# Patient Record
Sex: MALE | Race: WHITE | NOT HISPANIC OR LATINO | Employment: UNEMPLOYED | URBAN - METROPOLITAN AREA
[De-identification: names, ages, dates, MRNs, and addresses within clinical notes are randomized per-mention and may not be internally consistent; named-entity substitution may affect disease eponyms.]

---

## 2021-05-27 ENCOUNTER — HOSPITAL ENCOUNTER (EMERGENCY)
Facility: HOSPITAL | Age: 66
Discharge: HOME | End: 2021-05-27
Attending: EMERGENCY MEDICINE
Payer: MEDICARE

## 2021-05-27 VITALS
SYSTOLIC BLOOD PRESSURE: 156 MMHG | TEMPERATURE: 98 F | DIASTOLIC BLOOD PRESSURE: 81 MMHG | BODY MASS INDEX: 33.25 KG/M2 | RESPIRATION RATE: 16 BRPM | HEART RATE: 88 BPM | OXYGEN SATURATION: 98 % | WEIGHT: 206.88 LBS | HEIGHT: 66 IN

## 2021-05-27 DIAGNOSIS — R60.9 EDEMA: ICD-10-CM

## 2021-05-27 DIAGNOSIS — I10 ESSENTIAL HYPERTENSION: ICD-10-CM

## 2021-05-27 DIAGNOSIS — M54.32 SCIATICA OF LEFT SIDE: ICD-10-CM

## 2021-05-27 DIAGNOSIS — M79.89 LEG SWELLING: Primary | ICD-10-CM

## 2021-05-27 LAB
ALBUMIN SERPL BCP-MCNC: 4.3 G/DL (ref 3.5–5.2)
ALP SERPL-CCNC: 83 U/L (ref 55–135)
ALT SERPL W/O P-5'-P-CCNC: 22 U/L (ref 10–44)
ANION GAP SERPL CALC-SCNC: 12 MMOL/L (ref 8–16)
AST SERPL-CCNC: 26 U/L (ref 10–40)
BASOPHILS # BLD AUTO: 0.06 K/UL (ref 0–0.2)
BASOPHILS NFR BLD: 0.5 % (ref 0–1.9)
BILIRUB SERPL-MCNC: 0.5 MG/DL (ref 0.1–1)
BNP SERPL-MCNC: 21 PG/ML (ref 0–99)
BUN SERPL-MCNC: 17 MG/DL (ref 8–23)
CALCIUM SERPL-MCNC: 9.6 MG/DL (ref 8.7–10.5)
CHLORIDE SERPL-SCNC: 100 MMOL/L (ref 95–110)
CO2 SERPL-SCNC: 30 MMOL/L (ref 23–29)
CREAT SERPL-MCNC: 1.1 MG/DL (ref 0.5–1.4)
DIFFERENTIAL METHOD: ABNORMAL
EOSINOPHIL # BLD AUTO: 0.2 K/UL (ref 0–0.5)
EOSINOPHIL NFR BLD: 1.5 % (ref 0–8)
ERYTHROCYTE [DISTWIDTH] IN BLOOD BY AUTOMATED COUNT: 12.6 % (ref 11.5–14.5)
EST. GFR  (AFRICAN AMERICAN): >60 ML/MIN/1.73 M^2
EST. GFR  (NON AFRICAN AMERICAN): >60 ML/MIN/1.73 M^2
GLUCOSE SERPL-MCNC: 95 MG/DL (ref 70–110)
HCT VFR BLD AUTO: 43 % (ref 40–54)
HCV AB SERPL QL IA: NEGATIVE
HEP C VIRUS HOLD SPECIMEN: NORMAL
HGB BLD-MCNC: 14.2 G/DL (ref 14–18)
IMM GRANULOCYTES # BLD AUTO: 0.08 K/UL (ref 0–0.04)
IMM GRANULOCYTES NFR BLD AUTO: 0.7 % (ref 0–0.5)
LYMPHOCYTES # BLD AUTO: 2.2 K/UL (ref 1–4.8)
LYMPHOCYTES NFR BLD: 18.8 % (ref 18–48)
MCH RBC QN AUTO: 30.6 PG (ref 27–31)
MCHC RBC AUTO-ENTMCNC: 33 G/DL (ref 32–36)
MCV RBC AUTO: 93 FL (ref 82–98)
MONOCYTES # BLD AUTO: 1 K/UL (ref 0.3–1)
MONOCYTES NFR BLD: 8.4 % (ref 4–15)
NEUTROPHILS # BLD AUTO: 8.3 K/UL (ref 1.8–7.7)
NEUTROPHILS NFR BLD: 70.1 % (ref 38–73)
NRBC BLD-RTO: 0 /100 WBC
PLATELET # BLD AUTO: 302 K/UL (ref 150–450)
PMV BLD AUTO: 10.9 FL (ref 9.2–12.9)
POTASSIUM SERPL-SCNC: 4 MMOL/L (ref 3.5–5.1)
PROT SERPL-MCNC: 7.9 G/DL (ref 6–8.4)
RBC # BLD AUTO: 4.64 M/UL (ref 4.6–6.2)
SODIUM SERPL-SCNC: 142 MMOL/L (ref 136–145)
WBC # BLD AUTO: 11.87 K/UL (ref 3.9–12.7)

## 2021-05-27 PROCEDURE — 36415 COLL VENOUS BLD VENIPUNCTURE: CPT | Performed by: EMERGENCY MEDICINE

## 2021-05-27 PROCEDURE — 83880 ASSAY OF NATRIURETIC PEPTIDE: CPT | Performed by: PHYSICIAN ASSISTANT

## 2021-05-27 PROCEDURE — 85025 COMPLETE CBC W/AUTO DIFF WBC: CPT | Performed by: PHYSICIAN ASSISTANT

## 2021-05-27 PROCEDURE — 80053 COMPREHEN METABOLIC PANEL: CPT | Performed by: PHYSICIAN ASSISTANT

## 2021-05-27 PROCEDURE — 86803 HEPATITIS C AB TEST: CPT | Performed by: EMERGENCY MEDICINE

## 2021-05-27 PROCEDURE — 99284 EMERGENCY DEPT VISIT MOD MDM: CPT | Mod: 25

## 2021-05-28 ENCOUNTER — PES CALL (OUTPATIENT)
Dept: ADMINISTRATIVE | Facility: CLINIC | Age: 66
End: 2021-05-28

## 2021-06-03 ENCOUNTER — HOSPITAL ENCOUNTER (EMERGENCY)
Facility: HOSPITAL | Age: 66
Discharge: HOME | End: 2021-06-03
Attending: FAMILY MEDICINE
Payer: MEDICARE

## 2021-06-03 VITALS
SYSTOLIC BLOOD PRESSURE: 114 MMHG | TEMPERATURE: 98 F | HEIGHT: 66 IN | RESPIRATION RATE: 16 BRPM | HEART RATE: 76 BPM | OXYGEN SATURATION: 98 % | DIASTOLIC BLOOD PRESSURE: 63 MMHG | BODY MASS INDEX: 33.39 KG/M2

## 2021-06-03 DIAGNOSIS — M54.32 LEFT SIDED SCIATICA: Primary | ICD-10-CM

## 2021-06-03 PROCEDURE — 25000003 PHARM REV CODE 250: Performed by: NURSE PRACTITIONER

## 2021-06-03 PROCEDURE — 96372 THER/PROPH/DIAG INJ SC/IM: CPT

## 2021-06-03 PROCEDURE — 99284 EMERGENCY DEPT VISIT MOD MDM: CPT | Mod: 25

## 2021-06-03 PROCEDURE — 63600175 PHARM REV CODE 636 W HCPCS: Performed by: NURSE PRACTITIONER

## 2021-06-03 RX ORDER — CYCLOBENZAPRINE HCL 10 MG
10 TABLET ORAL 3 TIMES DAILY PRN
Qty: 15 TABLET | Refills: 0 | Status: SHIPPED | OUTPATIENT
Start: 2021-06-03 | End: 2021-06-08

## 2021-06-03 RX ORDER — ORPHENADRINE CITRATE 30 MG/ML
60 INJECTION INTRAMUSCULAR; INTRAVENOUS
Status: COMPLETED | OUTPATIENT
Start: 2021-06-03 | End: 2021-06-03

## 2021-06-03 RX ORDER — HYDROCODONE BITARTRATE AND ACETAMINOPHEN 7.5; 325 MG/1; MG/1
1 TABLET ORAL EVERY 12 HOURS PRN
Qty: 12 TABLET | Refills: 0 | Status: SHIPPED | OUTPATIENT
Start: 2021-06-03 | End: 2021-06-09

## 2021-06-03 RX ORDER — MEPERIDINE HYDROCHLORIDE 25 MG/ML
50 INJECTION INTRAMUSCULAR; INTRAVENOUS; SUBCUTANEOUS
Status: COMPLETED | OUTPATIENT
Start: 2021-06-03 | End: 2021-06-03

## 2021-06-03 RX ORDER — PREDNISONE 10 MG/1
10 TABLET ORAL DAILY
Qty: 21 TABLET | Refills: 0 | Status: SHIPPED | OUTPATIENT
Start: 2021-06-03 | End: 2022-01-05

## 2021-06-03 RX ORDER — DICLOFENAC SODIUM 50 MG/1
50 TABLET, DELAYED RELEASE ORAL 3 TIMES DAILY PRN
Qty: 15 TABLET | Refills: 0 | Status: SHIPPED | OUTPATIENT
Start: 2021-06-03 | End: 2022-01-05

## 2021-06-03 RX ORDER — KETOROLAC TROMETHAMINE 10 MG/1
10 TABLET, FILM COATED ORAL
Status: COMPLETED | OUTPATIENT
Start: 2021-06-03 | End: 2021-06-03

## 2021-06-03 RX ORDER — ONDANSETRON 8 MG/1
8 TABLET, ORALLY DISINTEGRATING ORAL
Status: COMPLETED | OUTPATIENT
Start: 2021-06-03 | End: 2021-06-03

## 2021-06-03 RX ORDER — PREDNISONE 20 MG/1
60 TABLET ORAL
Status: COMPLETED | OUTPATIENT
Start: 2021-06-03 | End: 2021-06-03

## 2021-06-03 RX ADMIN — KETOROLAC TROMETHAMINE 10 MG: 10 TABLET, FILM COATED ORAL at 10:06

## 2021-06-03 RX ADMIN — PREDNISONE 60 MG: 20 TABLET ORAL at 10:06

## 2021-06-03 RX ADMIN — ONDANSETRON 8 MG: 8 TABLET, ORALLY DISINTEGRATING ORAL at 10:06

## 2021-06-03 RX ADMIN — ORPHENADRINE CITRATE 60 MG: 30 INJECTION INTRAMUSCULAR; INTRAVENOUS at 10:06

## 2021-06-03 RX ADMIN — MEPERIDINE HYDROCHLORIDE 50 MG: 25 INJECTION INTRAMUSCULAR; INTRAVENOUS; SUBCUTANEOUS at 10:06

## 2021-06-22 DIAGNOSIS — M25.512 LEFT SHOULDER PAIN, UNSPECIFIED CHRONICITY: Primary | ICD-10-CM

## 2021-06-23 ENCOUNTER — HOSPITAL ENCOUNTER (OUTPATIENT)
Dept: RADIOLOGY | Facility: HOSPITAL | Age: 66
Discharge: HOME | End: 2021-06-23
Attending: PHYSICIAN ASSISTANT
Payer: MEDICARE

## 2021-06-23 ENCOUNTER — OFFICE VISIT (OUTPATIENT)
Dept: ORTHOPEDICS | Facility: CLINIC | Age: 66
End: 2021-06-23
Payer: MEDICARE

## 2021-06-23 ENCOUNTER — TELEPHONE (OUTPATIENT)
Dept: PAIN MEDICINE | Facility: CLINIC | Age: 66
End: 2021-06-23

## 2021-06-23 VITALS
DIASTOLIC BLOOD PRESSURE: 74 MMHG | HEART RATE: 89 BPM | SYSTOLIC BLOOD PRESSURE: 111 MMHG | HEIGHT: 66 IN | BODY MASS INDEX: 33.11 KG/M2 | WEIGHT: 206 LBS

## 2021-06-23 DIAGNOSIS — M25.512 LEFT SHOULDER PAIN, UNSPECIFIED CHRONICITY: ICD-10-CM

## 2021-06-23 DIAGNOSIS — M79.18 MYOFASCIAL PAIN ON LEFT SIDE: Primary | ICD-10-CM

## 2021-06-23 DIAGNOSIS — M54.50 LUMBOSACRAL PAIN: ICD-10-CM

## 2021-06-23 DIAGNOSIS — M54.2 NECK PAIN: ICD-10-CM

## 2021-06-23 DIAGNOSIS — R20.0 NUMBNESS AND TINGLING OF LEFT LEG: ICD-10-CM

## 2021-06-23 DIAGNOSIS — R20.0 NUMBNESS AND TINGLING IN LEFT ARM: ICD-10-CM

## 2021-06-23 DIAGNOSIS — M54.50 LOW BACK PAIN, UNSPECIFIED BACK PAIN LATERALITY, UNSPECIFIED CHRONICITY, UNSPECIFIED WHETHER SCIATICA PRESENT: ICD-10-CM

## 2021-06-23 DIAGNOSIS — R20.2 NUMBNESS AND TINGLING IN LEFT ARM: ICD-10-CM

## 2021-06-23 DIAGNOSIS — R20.2 NUMBNESS AND TINGLING OF LEFT LEG: ICD-10-CM

## 2021-06-23 DIAGNOSIS — M54.12 CERVICAL RADICULOPATHY: ICD-10-CM

## 2021-06-23 DIAGNOSIS — M54.50 LOW BACK PAIN, UNSPECIFIED BACK PAIN LATERALITY, UNSPECIFIED CHRONICITY, UNSPECIFIED WHETHER SCIATICA PRESENT: Primary | ICD-10-CM

## 2021-06-23 PROCEDURE — 99999 PR PBB SHADOW E&M-EST. PATIENT-LVL IV: ICD-10-PCS | Mod: PBBFAC,,, | Performed by: PHYSICIAN ASSISTANT

## 2021-06-23 PROCEDURE — 99214 OFFICE O/P EST MOD 30 MIN: CPT | Mod: PBBFAC,25 | Performed by: PHYSICIAN ASSISTANT

## 2021-06-23 PROCEDURE — 72110 XR LUMBAR SPINE COMPLETE 5 VIEW: ICD-10-PCS | Mod: 26,,, | Performed by: RADIOLOGY

## 2021-06-23 PROCEDURE — 72110 X-RAY EXAM L-2 SPINE 4/>VWS: CPT | Mod: TC

## 2021-06-23 PROCEDURE — 72050 X-RAY EXAM NECK SPINE 4/5VWS: CPT | Mod: TC

## 2021-06-23 PROCEDURE — 72110 X-RAY EXAM L-2 SPINE 4/>VWS: CPT | Mod: 26,,, | Performed by: RADIOLOGY

## 2021-06-23 PROCEDURE — 73030 X-RAY EXAM OF SHOULDER: CPT | Mod: 26,LT,, | Performed by: RADIOLOGY

## 2021-06-23 PROCEDURE — 72050 X-RAY EXAM NECK SPINE 4/5VWS: CPT | Mod: 26,,, | Performed by: RADIOLOGY

## 2021-06-23 PROCEDURE — 72050 XR CERVICAL SPINE COMPLETE 5 VIEW: ICD-10-PCS | Mod: 26,,, | Performed by: RADIOLOGY

## 2021-06-23 PROCEDURE — 99203 OFFICE O/P NEW LOW 30 MIN: CPT | Mod: S$PBB,,, | Performed by: PHYSICIAN ASSISTANT

## 2021-06-23 PROCEDURE — 73030 XR SHOULDER COMPLETE 2 OR MORE VIEWS LEFT: ICD-10-PCS | Mod: 26,LT,, | Performed by: RADIOLOGY

## 2021-06-23 PROCEDURE — 73030 X-RAY EXAM OF SHOULDER: CPT | Mod: TC,LT

## 2021-06-23 PROCEDURE — 99203 PR OFFICE/OUTPT VISIT, NEW, LEVL III, 30-44 MIN: ICD-10-PCS | Mod: S$PBB,,, | Performed by: PHYSICIAN ASSISTANT

## 2021-06-23 PROCEDURE — 99999 PR PBB SHADOW E&M-EST. PATIENT-LVL IV: CPT | Mod: PBBFAC,,, | Performed by: PHYSICIAN ASSISTANT

## 2021-06-23 RX ORDER — CYCLOBENZAPRINE HCL 5 MG
5 TABLET ORAL 3 TIMES DAILY PRN
Qty: 60 TABLET | Refills: 0 | Status: SHIPPED | OUTPATIENT
Start: 2021-06-23 | End: 2022-01-05

## 2021-06-23 RX ORDER — POTASSIUM CHLORIDE 1.5 G/1.58G
20 POWDER, FOR SOLUTION ORAL
COMMUNITY
End: 2022-01-05 | Stop reason: SDUPTHER

## 2021-06-23 RX ORDER — NAPROXEN 500 MG/1
500 TABLET ORAL
COMMUNITY
End: 2022-01-05

## 2021-06-23 RX ORDER — FUROSEMIDE 40 MG/1
40 TABLET ORAL EVERY MORNING
Status: ON HOLD | COMMUNITY
End: 2022-01-08 | Stop reason: HOSPADM

## 2021-06-23 RX ORDER — AMLODIPINE BESYLATE 5 MG/1
10 TABLET ORAL
COMMUNITY
End: 2022-01-05

## 2021-06-23 RX ORDER — AMLODIPINE BESYLATE 10 MG/1
10 TABLET ORAL DAILY
COMMUNITY
End: 2022-01-14

## 2021-06-23 RX ORDER — LISINOPRIL 20 MG/1
20 TABLET ORAL DAILY
COMMUNITY
End: 2022-05-05

## 2021-06-23 RX ORDER — METFORMIN HYDROCHLORIDE 500 MG/1
500 TABLET ORAL 2 TIMES DAILY WITH MEALS
COMMUNITY
End: 2022-05-05

## 2021-06-23 RX ORDER — GABAPENTIN 300 MG/1
300 CAPSULE ORAL NIGHTLY
Qty: 30 CAPSULE | Refills: 1 | Status: SHIPPED | OUTPATIENT
Start: 2021-06-23 | End: 2022-01-14 | Stop reason: SDUPTHER

## 2021-06-28 ENCOUNTER — TELEPHONE (OUTPATIENT)
Dept: PAIN MEDICINE | Facility: CLINIC | Age: 66
End: 2021-06-28

## 2021-06-29 ENCOUNTER — CLINICAL SUPPORT (OUTPATIENT)
Dept: REHABILITATION | Facility: HOSPITAL | Age: 66
End: 2021-06-29
Payer: MEDICARE

## 2021-06-29 DIAGNOSIS — M79.18 MYOFASCIAL PAIN ON LEFT SIDE: ICD-10-CM

## 2021-06-29 DIAGNOSIS — R29.898 DECREASED STRENGTH OF LOWER EXTREMITY: ICD-10-CM

## 2021-06-29 DIAGNOSIS — M54.50 LUMBOSACRAL PAIN: ICD-10-CM

## 2021-06-29 DIAGNOSIS — M53.86 DECREASED ROM OF LUMBAR SPINE: ICD-10-CM

## 2021-06-29 DIAGNOSIS — R29.898 DECREASED ROM OF NECK: ICD-10-CM

## 2021-06-29 DIAGNOSIS — M54.12 CERVICAL RADICULOPATHY: ICD-10-CM

## 2021-06-29 PROCEDURE — 97110 THERAPEUTIC EXERCISES: CPT | Mod: PN

## 2021-06-29 PROCEDURE — 97161 PT EVAL LOW COMPLEX 20 MIN: CPT | Mod: PN

## 2021-07-06 ENCOUNTER — CLINICAL SUPPORT (OUTPATIENT)
Dept: REHABILITATION | Facility: HOSPITAL | Age: 66
End: 2021-07-06
Payer: MEDICARE

## 2021-07-06 DIAGNOSIS — R29.898 DECREASED ROM OF NECK: ICD-10-CM

## 2021-07-06 DIAGNOSIS — M53.86 DECREASED ROM OF LUMBAR SPINE: ICD-10-CM

## 2021-07-06 DIAGNOSIS — R29.898 DECREASED STRENGTH OF LOWER EXTREMITY: ICD-10-CM

## 2021-07-06 PROCEDURE — 97012 MECHANICAL TRACTION THERAPY: CPT | Mod: PN

## 2021-07-06 PROCEDURE — 97110 THERAPEUTIC EXERCISES: CPT | Mod: PN

## 2021-07-08 ENCOUNTER — CLINICAL SUPPORT (OUTPATIENT)
Dept: REHABILITATION | Facility: HOSPITAL | Age: 66
End: 2021-07-08
Payer: MEDICARE

## 2021-07-08 DIAGNOSIS — M53.86 DECREASED ROM OF LUMBAR SPINE: ICD-10-CM

## 2021-07-08 DIAGNOSIS — R29.898 DECREASED ROM OF NECK: ICD-10-CM

## 2021-07-08 DIAGNOSIS — R29.898 DECREASED STRENGTH OF LOWER EXTREMITY: ICD-10-CM

## 2021-07-08 PROCEDURE — 97012 MECHANICAL TRACTION THERAPY: CPT | Mod: PN

## 2021-07-08 PROCEDURE — 97110 THERAPEUTIC EXERCISES: CPT | Mod: PN

## 2021-07-14 ENCOUNTER — DOCUMENTATION ONLY (OUTPATIENT)
Dept: REHABILITATION | Facility: HOSPITAL | Age: 66
End: 2021-07-14

## 2021-07-16 ENCOUNTER — DOCUMENTATION ONLY (OUTPATIENT)
Dept: REHABILITATION | Facility: HOSPITAL | Age: 66
End: 2021-07-16

## 2021-07-20 ENCOUNTER — CLINICAL SUPPORT (OUTPATIENT)
Dept: REHABILITATION | Facility: HOSPITAL | Age: 66
End: 2021-07-20
Payer: MEDICARE

## 2021-07-20 DIAGNOSIS — M53.86 DECREASED ROM OF LUMBAR SPINE: ICD-10-CM

## 2021-07-20 DIAGNOSIS — R29.898 DECREASED STRENGTH OF LOWER EXTREMITY: ICD-10-CM

## 2021-07-20 DIAGNOSIS — R29.898 DECREASED ROM OF NECK: ICD-10-CM

## 2021-07-20 PROCEDURE — 97012 MECHANICAL TRACTION THERAPY: CPT | Mod: PN

## 2021-07-20 PROCEDURE — 97110 THERAPEUTIC EXERCISES: CPT | Mod: PN

## 2021-07-22 ENCOUNTER — CLINICAL SUPPORT (OUTPATIENT)
Dept: REHABILITATION | Facility: HOSPITAL | Age: 66
End: 2021-07-22
Payer: MEDICARE

## 2021-07-22 DIAGNOSIS — R29.898 DECREASED ROM OF NECK: ICD-10-CM

## 2021-07-22 DIAGNOSIS — M53.86 DECREASED ROM OF LUMBAR SPINE: ICD-10-CM

## 2021-07-22 DIAGNOSIS — R29.898 DECREASED STRENGTH OF LOWER EXTREMITY: ICD-10-CM

## 2021-07-22 PROCEDURE — 97110 THERAPEUTIC EXERCISES: CPT | Mod: PN

## 2021-07-22 PROCEDURE — 97014 ELECTRIC STIMULATION THERAPY: CPT | Mod: PN

## 2021-07-29 ENCOUNTER — CLINICAL SUPPORT (OUTPATIENT)
Dept: REHABILITATION | Facility: HOSPITAL | Age: 66
End: 2021-07-29
Payer: MEDICARE

## 2021-07-29 DIAGNOSIS — R29.898 DECREASED STRENGTH OF LOWER EXTREMITY: ICD-10-CM

## 2021-07-29 DIAGNOSIS — M53.86 DECREASED ROM OF LUMBAR SPINE: ICD-10-CM

## 2021-07-29 DIAGNOSIS — R29.898 DECREASED ROM OF NECK: ICD-10-CM

## 2021-07-29 PROCEDURE — 97014 ELECTRIC STIMULATION THERAPY: CPT | Mod: PN

## 2021-07-29 PROCEDURE — 97110 THERAPEUTIC EXERCISES: CPT | Mod: PN

## 2021-08-05 ENCOUNTER — OFFICE VISIT (OUTPATIENT)
Dept: PHYSICAL MEDICINE AND REHAB | Facility: CLINIC | Age: 66
End: 2021-08-05
Payer: MEDICARE

## 2021-08-05 VITALS
HEIGHT: 66 IN | BODY MASS INDEX: 33.11 KG/M2 | WEIGHT: 206 LBS | RESPIRATION RATE: 14 BRPM | HEART RATE: 91 BPM | SYSTOLIC BLOOD PRESSURE: 130 MMHG | DIASTOLIC BLOOD PRESSURE: 81 MMHG

## 2021-08-05 DIAGNOSIS — M54.16 LUMBAR RADICULOPATHY: ICD-10-CM

## 2021-08-05 DIAGNOSIS — M54.12 CERVICAL RADICULOPATHY: Primary | ICD-10-CM

## 2021-08-05 DIAGNOSIS — G56.03 BILATERAL CARPAL TUNNEL SYNDROME: ICD-10-CM

## 2021-08-05 PROBLEM — B00.9 HERPES SIMPLEX TYPE 1 INFECTION: Status: ACTIVE | Noted: 2020-12-11

## 2021-08-05 PROBLEM — E78.2 MIXED HYPERLIPIDEMIA: Status: ACTIVE | Noted: 2020-12-11

## 2021-08-05 PROBLEM — R73.03 PREDIABETES: Status: ACTIVE | Noted: 2020-12-11

## 2021-08-05 PROBLEM — E55.9 VITAMIN D DEFICIENCY: Status: ACTIVE | Noted: 2020-12-11

## 2021-08-05 PROCEDURE — 99999 PR PBB SHADOW E&M-EST. PATIENT-LVL III: CPT | Mod: PBBFAC,,, | Performed by: PHYSICAL MEDICINE & REHABILITATION

## 2021-08-05 PROCEDURE — 95913 NRV CNDJ TEST 13/> STUDIES: CPT | Mod: 26,S$PBB,, | Performed by: PHYSICAL MEDICINE & REHABILITATION

## 2021-08-05 PROCEDURE — 95886 MUSC TEST DONE W/N TEST COMP: CPT | Mod: PBBFAC | Performed by: PHYSICAL MEDICINE & REHABILITATION

## 2021-08-05 PROCEDURE — 95886 PR EMG COMPLETE, W/ NERVE CONDUCTION STUDIES, 5+ MUSCLES: ICD-10-PCS | Mod: 26,S$PBB,, | Performed by: PHYSICAL MEDICINE & REHABILITATION

## 2021-08-05 PROCEDURE — 99213 OFFICE O/P EST LOW 20 MIN: CPT | Mod: PBBFAC,25 | Performed by: PHYSICAL MEDICINE & REHABILITATION

## 2021-08-05 PROCEDURE — 95886 MUSC TEST DONE W/N TEST COMP: CPT | Mod: 26,S$PBB,, | Performed by: PHYSICAL MEDICINE & REHABILITATION

## 2021-08-05 PROCEDURE — 99999 PR PBB SHADOW E&M-EST. PATIENT-LVL III: ICD-10-PCS | Mod: PBBFAC,,, | Performed by: PHYSICAL MEDICINE & REHABILITATION

## 2021-08-05 PROCEDURE — 99204 PR OFFICE/OUTPT VISIT, NEW, LEVL IV, 45-59 MIN: ICD-10-PCS | Mod: 25,S$PBB,, | Performed by: PHYSICAL MEDICINE & REHABILITATION

## 2021-08-05 PROCEDURE — 95913 NRV CNDJ TEST 13/> STUDIES: CPT | Mod: PBBFAC | Performed by: PHYSICAL MEDICINE & REHABILITATION

## 2021-08-05 PROCEDURE — 95913 PR NERVE CONDUCTION STUDY; 13 OR MORE STUDIES: ICD-10-PCS | Mod: 26,S$PBB,, | Performed by: PHYSICAL MEDICINE & REHABILITATION

## 2021-08-05 PROCEDURE — 99204 OFFICE O/P NEW MOD 45 MIN: CPT | Mod: 25,S$PBB,, | Performed by: PHYSICAL MEDICINE & REHABILITATION

## 2021-08-05 RX ORDER — TRAMADOL HYDROCHLORIDE 50 MG/1
TABLET ORAL
COMMUNITY
End: 2022-01-05

## 2021-08-05 RX ORDER — TIZANIDINE 4 MG/1
TABLET ORAL
COMMUNITY
End: 2022-01-05

## 2021-08-05 RX ORDER — HYDROCHLOROTHIAZIDE 25 MG/1
25 TABLET ORAL DAILY
Status: ON HOLD | COMMUNITY
Start: 2021-07-24 | End: 2022-01-08 | Stop reason: HOSPADM

## 2021-08-11 ENCOUNTER — PATIENT MESSAGE (OUTPATIENT)
Dept: ORTHOPEDICS | Facility: CLINIC | Age: 66
End: 2021-08-11

## 2021-09-28 ENCOUNTER — DOCUMENTATION ONLY (OUTPATIENT)
Dept: REHABILITATION | Facility: HOSPITAL | Age: 66
End: 2021-09-28

## 2021-09-28 PROBLEM — R29.898 DECREASED STRENGTH OF LOWER EXTREMITY: Status: RESOLVED | Noted: 2021-06-29 | Resolved: 2021-09-28

## 2021-09-28 PROBLEM — R29.898 DECREASED ROM OF NECK: Status: RESOLVED | Noted: 2021-06-29 | Resolved: 2021-09-28

## 2021-09-28 PROBLEM — M53.86 DECREASED ROM OF LUMBAR SPINE: Status: RESOLVED | Noted: 2021-06-29 | Resolved: 2021-09-28

## 2021-12-22 ENCOUNTER — OFFICE VISIT (OUTPATIENT)
Dept: OPHTHALMOLOGY | Facility: CLINIC | Age: 66
End: 2021-12-22
Payer: MEDICARE

## 2021-12-22 ENCOUNTER — LAB VISIT (OUTPATIENT)
Dept: LAB | Facility: HOSPITAL | Age: 66
End: 2021-12-22
Attending: OPHTHALMOLOGY
Payer: MEDICARE

## 2021-12-22 DIAGNOSIS — H53.453 ALTITUDINAL SCOTOMA OF BOTH EYES: Primary | ICD-10-CM

## 2021-12-22 DIAGNOSIS — H47.20 OPTIC ATROPHY, BOTH EYES: ICD-10-CM

## 2021-12-22 DIAGNOSIS — H53.453 ALTITUDINAL SCOTOMA OF BOTH EYES: ICD-10-CM

## 2021-12-22 LAB
BASOPHILS # BLD AUTO: 0.05 K/UL (ref 0–0.2)
BASOPHILS NFR BLD: 0.5 % (ref 0–1.9)
CREAT SERPL-MCNC: 1.3 MG/DL (ref 0.5–1.4)
CRP SERPL-MCNC: 8.5 MG/L (ref 0–8.2)
DIFFERENTIAL METHOD: ABNORMAL
EOSINOPHIL # BLD AUTO: 0.2 K/UL (ref 0–0.5)
EOSINOPHIL NFR BLD: 1.9 % (ref 0–8)
ERYTHROCYTE [DISTWIDTH] IN BLOOD BY AUTOMATED COUNT: 13 % (ref 11.5–14.5)
ERYTHROCYTE [SEDIMENTATION RATE] IN BLOOD BY WESTERGREN METHOD: 16 MM/HR (ref 0–23)
EST. GFR  (AFRICAN AMERICAN): >60 ML/MIN/1.73 M^2
EST. GFR  (NON AFRICAN AMERICAN): 56.5 ML/MIN/1.73 M^2
HCT VFR BLD AUTO: 41.6 % (ref 40–54)
HGB BLD-MCNC: 12.8 G/DL (ref 14–18)
IMM GRANULOCYTES # BLD AUTO: 0.1 K/UL (ref 0–0.04)
IMM GRANULOCYTES NFR BLD AUTO: 0.9 % (ref 0–0.5)
LYMPHOCYTES # BLD AUTO: 2 K/UL (ref 1–4.8)
LYMPHOCYTES NFR BLD: 18.4 % (ref 18–48)
MCH RBC QN AUTO: 30 PG (ref 27–31)
MCHC RBC AUTO-ENTMCNC: 30.8 G/DL (ref 32–36)
MCV RBC AUTO: 97 FL (ref 82–98)
MONOCYTES # BLD AUTO: 0.9 K/UL (ref 0.3–1)
MONOCYTES NFR BLD: 8.2 % (ref 4–15)
NEUTROPHILS # BLD AUTO: 7.6 K/UL (ref 1.8–7.7)
NEUTROPHILS NFR BLD: 70.1 % (ref 38–73)
NRBC BLD-RTO: 0 /100 WBC
PLATELET # BLD AUTO: 325 K/UL (ref 150–450)
PMV BLD AUTO: 10.5 FL (ref 9.2–12.9)
RBC # BLD AUTO: 4.27 M/UL (ref 4.6–6.2)
WBC # BLD AUTO: 10.76 K/UL (ref 3.9–12.7)

## 2021-12-22 PROCEDURE — 92133 POSTERIOR SEGMENT OCT OPTIC NERVE(OCULAR COHERENCE TOMOGRAPHY) - OU - BOTH EYES: ICD-10-PCS | Mod: 26,S$PBB,, | Performed by: OPHTHALMOLOGY

## 2021-12-22 PROCEDURE — 99999 PR PBB SHADOW E&M-EST. PATIENT-LVL III: CPT | Mod: PBBFAC,,, | Performed by: OPHTHALMOLOGY

## 2021-12-22 PROCEDURE — 85652 RBC SED RATE AUTOMATED: CPT | Performed by: OPHTHALMOLOGY

## 2021-12-22 PROCEDURE — 92133 CPTRZD OPH DX IMG PST SGM ON: CPT | Mod: PBBFAC | Performed by: OPHTHALMOLOGY

## 2021-12-22 PROCEDURE — 86140 C-REACTIVE PROTEIN: CPT | Performed by: OPHTHALMOLOGY

## 2021-12-22 PROCEDURE — 36415 COLL VENOUS BLD VENIPUNCTURE: CPT | Performed by: OPHTHALMOLOGY

## 2021-12-22 PROCEDURE — 99204 OFFICE O/P NEW MOD 45 MIN: CPT | Mod: S$PBB,,, | Performed by: OPHTHALMOLOGY

## 2021-12-22 PROCEDURE — 99213 OFFICE O/P EST LOW 20 MIN: CPT | Mod: PBBFAC | Performed by: OPHTHALMOLOGY

## 2021-12-22 PROCEDURE — 99999 PR PBB SHADOW E&M-EST. PATIENT-LVL III: ICD-10-PCS | Mod: PBBFAC,,, | Performed by: OPHTHALMOLOGY

## 2021-12-22 PROCEDURE — 85025 COMPLETE CBC W/AUTO DIFF WBC: CPT | Performed by: OPHTHALMOLOGY

## 2021-12-22 PROCEDURE — 82565 ASSAY OF CREATININE: CPT | Performed by: OPHTHALMOLOGY

## 2021-12-22 PROCEDURE — 99204 PR OFFICE/OUTPT VISIT, NEW, LEVL IV, 45-59 MIN: ICD-10-PCS | Mod: S$PBB,,, | Performed by: OPHTHALMOLOGY

## 2022-01-05 ENCOUNTER — HOSPITAL ENCOUNTER (OUTPATIENT)
Dept: RADIOLOGY | Facility: HOSPITAL | Age: 67
Discharge: HOME | End: 2022-01-05
Attending: OPHTHALMOLOGY
Payer: MEDICARE

## 2022-01-05 ENCOUNTER — TELEPHONE (OUTPATIENT)
Dept: OPHTHALMOLOGY | Facility: CLINIC | Age: 67
End: 2022-01-05
Payer: MEDICARE

## 2022-01-05 ENCOUNTER — HOSPITAL ENCOUNTER (INPATIENT)
Facility: HOSPITAL | Age: 67
LOS: 2 days | Discharge: HOME | DRG: 064 | End: 2022-01-08
Attending: EMERGENCY MEDICINE | Admitting: INTERNAL MEDICINE
Payer: MEDICARE

## 2022-01-05 DIAGNOSIS — I63.9 ACUTE CVA (CEREBROVASCULAR ACCIDENT): ICD-10-CM

## 2022-01-05 DIAGNOSIS — H53.453 ALTITUDINAL SCOTOMA OF BOTH EYES: ICD-10-CM

## 2022-01-05 DIAGNOSIS — I63.9 STROKE DUE TO EMBOLISM: ICD-10-CM

## 2022-01-05 DIAGNOSIS — U07.1 COVID-19 VIRUS INFECTION: ICD-10-CM

## 2022-01-05 DIAGNOSIS — I63.9 CEREBROVASCULAR ACCIDENT (CVA), UNSPECIFIED MECHANISM: Primary | ICD-10-CM

## 2022-01-05 DIAGNOSIS — H47.20 OPTIC ATROPHY, BOTH EYES: ICD-10-CM

## 2022-01-05 DIAGNOSIS — I63.9 CVA (CEREBRAL VASCULAR ACCIDENT): ICD-10-CM

## 2022-01-05 PROBLEM — N17.9 AKI (ACUTE KIDNEY INJURY): Status: ACTIVE | Noted: 2022-01-05

## 2022-01-05 PROBLEM — R46.89: Status: ACTIVE | Noted: 2022-01-05

## 2022-01-05 PROBLEM — B35.6 TINEA CRURIS: Status: ACTIVE | Noted: 2022-01-05

## 2022-01-05 LAB
ALBUMIN SERPL BCP-MCNC: 3.6 G/DL (ref 3.5–5.2)
ALP SERPL-CCNC: 76 U/L (ref 55–135)
ALT SERPL W/O P-5'-P-CCNC: 35 U/L (ref 10–44)
ANION GAP SERPL CALC-SCNC: 8 MMOL/L (ref 8–16)
AST SERPL-CCNC: 33 U/L (ref 10–40)
BASOPHILS # BLD AUTO: 0.05 K/UL (ref 0–0.2)
BASOPHILS NFR BLD: 0.5 % (ref 0–1.9)
BILIRUB SERPL-MCNC: 0.3 MG/DL (ref 0.1–1)
BILIRUB UR QL STRIP: NEGATIVE
BUN SERPL-MCNC: 62 MG/DL (ref 8–23)
CALCIUM SERPL-MCNC: 9.2 MG/DL (ref 8.7–10.5)
CHLORIDE SERPL-SCNC: 105 MMOL/L (ref 95–110)
CLARITY UR: CLEAR
CO2 SERPL-SCNC: 27 MMOL/L (ref 23–29)
COLOR UR: YELLOW
CREAT SERPL-MCNC: 2.6 MG/DL (ref 0.5–1.4)
DIFFERENTIAL METHOD: ABNORMAL
EOSINOPHIL # BLD AUTO: 0.2 K/UL (ref 0–0.5)
EOSINOPHIL NFR BLD: 1.6 % (ref 0–8)
ERYTHROCYTE [DISTWIDTH] IN BLOOD BY AUTOMATED COUNT: 13 % (ref 11.5–14.5)
EST. GFR  (AFRICAN AMERICAN): 28 ML/MIN/1.73 M^2
EST. GFR  (NON AFRICAN AMERICAN): 24 ML/MIN/1.73 M^2
GLUCOSE SERPL-MCNC: 83 MG/DL (ref 70–110)
GLUCOSE UR QL STRIP: NEGATIVE
HCT VFR BLD AUTO: 36.3 % (ref 40–54)
HGB BLD-MCNC: 11.8 G/DL (ref 14–18)
HGB UR QL STRIP: NEGATIVE
IMM GRANULOCYTES # BLD AUTO: 0.15 K/UL (ref 0–0.04)
IMM GRANULOCYTES NFR BLD AUTO: 1.6 % (ref 0–0.5)
KETONES UR QL STRIP: NEGATIVE
LEUKOCYTE ESTERASE UR QL STRIP: NEGATIVE
LYMPHOCYTES # BLD AUTO: 1.9 K/UL (ref 1–4.8)
LYMPHOCYTES NFR BLD: 19.8 % (ref 18–48)
MCH RBC QN AUTO: 29.8 PG (ref 27–31)
MCHC RBC AUTO-ENTMCNC: 32.5 G/DL (ref 32–36)
MCV RBC AUTO: 92 FL (ref 82–98)
MONOCYTES # BLD AUTO: 1 K/UL (ref 0.3–1)
MONOCYTES NFR BLD: 10.6 % (ref 4–15)
NEUTROPHILS # BLD AUTO: 6.4 K/UL (ref 1.8–7.7)
NEUTROPHILS NFR BLD: 65.9 % (ref 38–73)
NITRITE UR QL STRIP: NEGATIVE
NRBC BLD-RTO: 0 /100 WBC
PH UR STRIP: 6 [PH] (ref 5–8)
PLATELET # BLD AUTO: 302 K/UL (ref 150–450)
PMV BLD AUTO: 10.6 FL (ref 9.2–12.9)
POCT GLUCOSE: 109 MG/DL (ref 70–110)
POTASSIUM SERPL-SCNC: 5.3 MMOL/L (ref 3.5–5.1)
PROT SERPL-MCNC: 6.7 G/DL (ref 6–8.4)
PROT UR QL STRIP: NEGATIVE
RBC # BLD AUTO: 3.96 M/UL (ref 4.6–6.2)
SODIUM SERPL-SCNC: 140 MMOL/L (ref 136–145)
SP GR UR STRIP: 1.02 (ref 1–1.03)
TSH SERPL DL<=0.005 MIU/L-ACNC: 0.6 UIU/ML (ref 0.4–4)
URN SPEC COLLECT METH UR: NORMAL
UROBILINOGEN UR STRIP-ACNC: NEGATIVE EU/DL
WBC # BLD AUTO: 9.63 K/UL (ref 3.9–12.7)

## 2022-01-05 PROCEDURE — 82962 GLUCOSE BLOOD TEST: CPT

## 2022-01-05 PROCEDURE — 63600175 PHARM REV CODE 636 W HCPCS: Performed by: FAMILY MEDICINE

## 2022-01-05 PROCEDURE — G0378 HOSPITAL OBSERVATION PER HR: HCPCS

## 2022-01-05 PROCEDURE — A9585 GADOBUTROL INJECTION: HCPCS | Performed by: OPHTHALMOLOGY

## 2022-01-05 PROCEDURE — 70553 MRI BRAIN W WO CONTRAST: ICD-10-PCS | Mod: 26,,, | Performed by: RADIOLOGY

## 2022-01-05 PROCEDURE — 99285 EMERGENCY DEPT VISIT HI MDM: CPT | Mod: 25

## 2022-01-05 PROCEDURE — 25500020 PHARM REV CODE 255: Performed by: OPHTHALMOLOGY

## 2022-01-05 PROCEDURE — 96374 THER/PROPH/DIAG INJ IV PUSH: CPT

## 2022-01-05 PROCEDURE — 80053 COMPREHEN METABOLIC PANEL: CPT | Performed by: EMERGENCY MEDICINE

## 2022-01-05 PROCEDURE — 99205 PR OFFICE/OUTPT VISIT, NEW, LEVL V, 60-74 MIN: ICD-10-PCS | Mod: ,,, | Performed by: PSYCHIATRY & NEUROLOGY

## 2022-01-05 PROCEDURE — 93010 ELECTROCARDIOGRAM REPORT: CPT | Mod: ,,, | Performed by: INTERNAL MEDICINE

## 2022-01-05 PROCEDURE — 70553 MRI BRAIN STEM W/O & W/DYE: CPT | Mod: 26,,, | Performed by: RADIOLOGY

## 2022-01-05 PROCEDURE — 70553 MRI BRAIN STEM W/O & W/DYE: CPT | Mod: TC

## 2022-01-05 PROCEDURE — 81003 URINALYSIS AUTO W/O SCOPE: CPT | Performed by: FAMILY MEDICINE

## 2022-01-05 PROCEDURE — 93010 EKG 12-LEAD: ICD-10-PCS | Mod: ,,, | Performed by: INTERNAL MEDICINE

## 2022-01-05 PROCEDURE — 93005 ELECTROCARDIOGRAM TRACING: CPT

## 2022-01-05 PROCEDURE — 85025 COMPLETE CBC W/AUTO DIFF WBC: CPT | Performed by: EMERGENCY MEDICINE

## 2022-01-05 PROCEDURE — 99205 OFFICE O/P NEW HI 60 MIN: CPT | Mod: ,,, | Performed by: PSYCHIATRY & NEUROLOGY

## 2022-01-05 PROCEDURE — 80061 LIPID PANEL: CPT | Performed by: FAMILY MEDICINE

## 2022-01-05 PROCEDURE — 84443 ASSAY THYROID STIM HORMONE: CPT | Performed by: FAMILY MEDICINE

## 2022-01-05 PROCEDURE — 25000003 PHARM REV CODE 250: Performed by: FAMILY MEDICINE

## 2022-01-05 PROCEDURE — 63600175 PHARM REV CODE 636 W HCPCS: Performed by: EMERGENCY MEDICINE

## 2022-01-05 PROCEDURE — 83036 HEMOGLOBIN GLYCOSYLATED A1C: CPT | Performed by: FAMILY MEDICINE

## 2022-01-05 RX ORDER — INSULIN ASPART 100 [IU]/ML
0-5 INJECTION, SOLUTION INTRAVENOUS; SUBCUTANEOUS
Status: DISCONTINUED | OUTPATIENT
Start: 2022-01-05 | End: 2022-01-06

## 2022-01-05 RX ORDER — DOXYLAMINE SUCCINATE 25 MG
TABLET ORAL 2 TIMES DAILY
Status: DISCONTINUED | OUTPATIENT
Start: 2022-01-05 | End: 2022-01-06

## 2022-01-05 RX ORDER — GADOBUTROL 604.72 MG/ML
10 INJECTION INTRAVENOUS
Status: COMPLETED | OUTPATIENT
Start: 2022-01-05 | End: 2022-01-05

## 2022-01-05 RX ORDER — METOPROLOL TARTRATE 1 MG/ML
5 INJECTION, SOLUTION INTRAVENOUS EVERY 4 HOURS PRN
Status: DISCONTINUED | OUTPATIENT
Start: 2022-01-05 | End: 2022-01-08 | Stop reason: HOSPADM

## 2022-01-05 RX ORDER — LANOLIN ALCOHOL/MO/W.PET/CERES
1000 CREAM (GRAM) TOPICAL NIGHTLY
Status: DISCONTINUED | OUTPATIENT
Start: 2022-01-05 | End: 2022-01-08 | Stop reason: HOSPADM

## 2022-01-05 RX ORDER — ACETAMINOPHEN 325 MG/1
650 TABLET ORAL EVERY 6 HOURS PRN
Status: DISCONTINUED | OUTPATIENT
Start: 2022-01-05 | End: 2022-01-08 | Stop reason: HOSPADM

## 2022-01-05 RX ORDER — GABAPENTIN 300 MG/1
300 CAPSULE ORAL NIGHTLY
Status: DISCONTINUED | OUTPATIENT
Start: 2022-01-05 | End: 2022-01-06

## 2022-01-05 RX ORDER — IBUPROFEN 200 MG
24 TABLET ORAL
Status: DISCONTINUED | OUTPATIENT
Start: 2022-01-05 | End: 2022-01-06

## 2022-01-05 RX ORDER — SODIUM CHLORIDE, SODIUM LACTATE, POTASSIUM CHLORIDE, CALCIUM CHLORIDE 600; 310; 30; 20 MG/100ML; MG/100ML; MG/100ML; MG/100ML
INJECTION, SOLUTION INTRAVENOUS CONTINUOUS
Status: DISCONTINUED | OUTPATIENT
Start: 2022-01-05 | End: 2022-01-06

## 2022-01-05 RX ORDER — POTASSIUM CHLORIDE 20 MEQ/1
20 TABLET, EXTENDED RELEASE ORAL DAILY
COMMUNITY
Start: 2021-12-27

## 2022-01-05 RX ORDER — IBUPROFEN 200 MG
16 TABLET ORAL
Status: DISCONTINUED | OUTPATIENT
Start: 2022-01-05 | End: 2022-01-06

## 2022-01-05 RX ORDER — ASPIRIN 81 MG/1
81 TABLET ORAL DAILY
Status: DISCONTINUED | OUTPATIENT
Start: 2022-01-05 | End: 2022-01-08 | Stop reason: HOSPADM

## 2022-01-05 RX ORDER — AMLODIPINE BESYLATE 10 MG/1
10 TABLET ORAL DAILY
Status: DISCONTINUED | OUTPATIENT
Start: 2022-01-06 | End: 2022-01-08 | Stop reason: HOSPADM

## 2022-01-05 RX ORDER — HEPARIN SODIUM 5000 [USP'U]/ML
5000 INJECTION, SOLUTION INTRAVENOUS; SUBCUTANEOUS EVERY 8 HOURS
Status: DISCONTINUED | OUTPATIENT
Start: 2022-01-05 | End: 2022-01-08 | Stop reason: HOSPADM

## 2022-01-05 RX ORDER — GLUCAGON 1 MG
1 KIT INJECTION
Status: DISCONTINUED | OUTPATIENT
Start: 2022-01-05 | End: 2022-01-06

## 2022-01-05 RX ORDER — SODIUM CHLORIDE 0.9 % (FLUSH) 0.9 %
10 SYRINGE (ML) INJECTION
Status: DISCONTINUED | OUTPATIENT
Start: 2022-01-05 | End: 2022-01-08 | Stop reason: HOSPADM

## 2022-01-05 RX ORDER — KETOROLAC TROMETHAMINE 30 MG/ML
15 INJECTION, SOLUTION INTRAMUSCULAR; INTRAVENOUS
Status: COMPLETED | OUTPATIENT
Start: 2022-01-05 | End: 2022-01-05

## 2022-01-05 RX ADMIN — GABAPENTIN 300 MG: 300 CAPSULE ORAL at 09:01

## 2022-01-05 RX ADMIN — GADOBUTROL 10 ML: 604.72 INJECTION INTRAVENOUS at 06:01

## 2022-01-05 RX ADMIN — ASPIRIN 81 MG: 81 TABLET, COATED ORAL at 06:01

## 2022-01-05 RX ADMIN — ACETAMINOPHEN 650 MG: 325 TABLET ORAL at 09:01

## 2022-01-05 RX ADMIN — SODIUM CHLORIDE, SODIUM LACTATE, POTASSIUM CHLORIDE, AND CALCIUM CHLORIDE: .6; .31; .03; .02 INJECTION, SOLUTION INTRAVENOUS at 06:01

## 2022-01-05 RX ADMIN — SODIUM CHLORIDE, SODIUM LACTATE, POTASSIUM CHLORIDE, AND CALCIUM CHLORIDE: .6; .31; .03; .02 INJECTION, SOLUTION INTRAVENOUS at 10:01

## 2022-01-05 RX ADMIN — KETOROLAC TROMETHAMINE 15 MG: 30 INJECTION, SOLUTION INTRAMUSCULAR at 12:01

## 2022-01-05 RX ADMIN — HEPARIN SODIUM 5000 UNITS: 5000 INJECTION INTRAVENOUS; SUBCUTANEOUS at 10:01

## 2022-01-05 NOTE — HPI
Mr. Morfin is a 66-year-old male with a past medical history of prediabetes and hyperlipidemia sent to ER by his neuro ophthalmologist due to acute CVA seen today on outpatient MRI.  Mr. Barrera has been having vision loss or scotoma in the last few weeks which prompted referral to ophthalmologist.  MRI today shows subacute right occipital and left cerebellar strokes.    Workup:  -white blood cell count 9.6  -Hemoglobin 11.8  Creatinine 2.6 (baseline 1.3 December 2021)  -GFR 24  -vital signs:  Temperature 97.9°, heart rate 93, respiratory rate 16, blood pressure 145/67, oxygen saturation 96% on room air    In the ER he received Toradol injection and was admitted for further evaluation and treatment of multiple posterior strokes.  At the time of this interview and physical exam Mr. Morfin is complaining of dizziness as well as itching in his nose, umbilical area and right groin which he is scratching compulsively there are open wounds in right groin and umbilical area.  He denies headache, chest pain, shortness of breath, nausea, vomiting, diarrhea denies falls.

## 2022-01-05 NOTE — TELEPHONE ENCOUNTER
Called patient informing patient of his MRI results per ---Acute stroke of Cerebellum of Occipital lobe(R). Advised patient to see PCP or go to the ED for stroke workup. Pt understood. lyssa

## 2022-01-05 NOTE — PHARMACY MED REC
"Admission Medication History     The home medication history was taken by Олег Alaniz.    You may go to "Admission" then "Reconcile Home Medications" tabs to review and/or act upon these items.      The home medication list has been updated by the Pharmacy department.    Please read ALL comments highlighted in yellow.    Please address this information as you see fit.     Feel free to contact us if you have any questions or require assistance.      Medications listed below were obtained from: Analytic software- [employer], Medical records and Patient's pharmacy    Олег Alaniz  [id number]      Current Outpatient Medications on File Prior to Encounter   Medication Sig Dispense Refill Last Dose    amLODIPine (NORVASC) 10 MG tablet Take 10 mg by mouth once daily.   Unknown at Unknown time    furosemide (LASIX) 40 MG tablet Take 40 mg by mouth every morning.   Unknown at Unknown time    gabapentin (NEURONTIN) 300 MG capsule Take 1 capsule (300 mg total) by mouth every evening. 30 capsule 1 Unknown at Unknown time    hydroCHLOROthiazide (HYDRODIURIL) 25 MG tablet Take 25 mg by mouth once daily.   Unknown at Unknown time    lisinopriL (PRINIVIL,ZESTRIL) 20 MG tablet Take 20 mg by mouth once daily.   Unknown at Unknown time    metFORMIN (GLUCOPHAGE) 500 MG tablet Take 500 mg by mouth 2 (two) times daily with meals.   Unknown at Unknown time    potassium chloride SA (K-DUR,KLOR-CON) 20 MEQ tablet Take 20 mEq by mouth once daily.   Unknown at Unknown time                           .          "

## 2022-01-05 NOTE — SUBJECTIVE & OBJECTIVE
No past medical history on file.    No past surgical history on file.    Review of patient's allergies indicates:   Allergen Reactions    Penicillins Swelling    Atorvastatin Other (See Comments)       Current Facility-Administered Medications on File Prior to Encounter   Medication    [COMPLETED] gadobutroL (GADAVIST) injection 10 mL     Current Outpatient Medications on File Prior to Encounter   Medication Sig    amLODIPine (NORVASC) 10 MG tablet Take 10 mg by mouth once daily.    furosemide (LASIX) 40 MG tablet Take 40 mg by mouth every morning.    gabapentin (NEURONTIN) 300 MG capsule Take 1 capsule (300 mg total) by mouth every evening.    hydroCHLOROthiazide (HYDRODIURIL) 25 MG tablet Take 25 mg by mouth once daily.    lisinopriL (PRINIVIL,ZESTRIL) 20 MG tablet Take 20 mg by mouth once daily.    metFORMIN (GLUCOPHAGE) 500 MG tablet Take 500 mg by mouth 2 (two) times daily with meals.    potassium chloride SA (K-DUR,KLOR-CON) 20 MEQ tablet Take 20 mEq by mouth once daily.    [DISCONTINUED] amLODIPine (NORVASC) 5 MG tablet Take 10 mg by mouth.    [DISCONTINUED] compress.stocking,knee,reg,med Misc 2 each by Misc.(Non-Drug; Combo Route) route Daily. Wear during the day.    [DISCONTINUED] cyclobenzaprine (FLEXERIL) 5 MG tablet Take 1 tablet (5 mg total) by mouth 3 (three) times daily as needed for Muscle spasms.    [DISCONTINUED] diclofenac (VOLTAREN) 50 MG EC tablet Take 1 tablet (50 mg total) by mouth 3 (three) times daily as needed.    [DISCONTINUED] naproxen (NAPROSYN) 500 MG tablet Take 500 mg by mouth.    [DISCONTINUED] potassium chloride (KLOR-CON) 20 mEq Pack Take 20 mEq by mouth.    [DISCONTINUED] predniSONE (DELTASONE) 10 MG tablet Take 1 tablet (10 mg total) by mouth once daily. Take 4 tabs x 3 days, then  Take 2 tabs x 3 days, then   Take 1 tab x 3 days.    [DISCONTINUED] tiZANidine (ZANAFLEX) 4 MG tablet tizanidine 4 mg tablet   TAKE 1 TABLET BY MOUTH EVERY 6 HOURS AS NEEDED     [DISCONTINUED] traMADoL (ULTRAM) 50 mg tablet tramadol 50 mg tablet   TAKE 1 TABLET BY MOUTH EVERY 6 HOURS AS NEEDED     Family History    None       Tobacco Use    Smoking status: Unknown If Ever Smoked    Smokeless tobacco: Not on file   Substance and Sexual Activity    Alcohol use: Not Currently    Drug use: Never    Sexual activity: Not Currently     Review of Systems   Constitutional: Negative for fever.   HENT: Negative for congestion.    Respiratory: Negative for cough.    Gastrointestinal: Negative for abdominal pain.   Endocrine: Negative for polyuria.   Genitourinary: Negative for flank pain.   Musculoskeletal: Negative for back pain.   Skin: Negative for rash.   Neurological: Positive for dizziness. Negative for syncope.   Psychiatric/Behavioral: Negative for confusion.     Objective:     Vital Signs (Most Recent):  Temp: 97.9 °F (36.6 °C) (01/05/22 1114)  Pulse: 97 (01/05/22 1601)  Resp: 16 (01/05/22 1114)  BP: 134/62 (01/05/22 1601)  SpO2: 97 % (01/05/22 1601) Vital Signs (24h Range):  Temp:  [97.9 °F (36.6 °C)] 97.9 °F (36.6 °C)  Pulse:  [83-97] 97  Resp:  [16] 16  SpO2:  [96 %-99 %] 97 %  BP: ()/() 134/62     Weight: 95.2 kg (209 lb 14.1 oz)  Body mass index is 33.88 kg/m².    Physical Exam  Constitutional:       General: He is not in acute distress.  HENT:      Head: Normocephalic and atraumatic.      Mouth/Throat:      Mouth: Mucous membranes are moist.   Eyes:      General: No scleral icterus.     Extraocular Movements: Extraocular movements intact.      Pupils: Pupils are equal, round, and reactive to light.   Cardiovascular:      Rate and Rhythm: Normal rate and regular rhythm.      Heart sounds: No murmur heard.      Pulmonary:      Effort: Pulmonary effort is normal.      Breath sounds: No wheezing or rales.   Abdominal:      General: Bowel sounds are normal. There is no distension.      Palpations: Abdomen is soft.      Tenderness: There is no abdominal tenderness.    Musculoskeletal:         General: No swelling or deformity.      Cervical back: Normal range of motion and neck supple.      Right lower leg: No edema.      Left lower leg: No edema.   Skin:     General: Skin is warm and dry.      Findings: Lesion present. No rash.      Comments: Excoriated lesions left lateral forearm, umbilical area has 2 cm lesion which patient has scratched open ulcer, linear skin tears right   Neurological:      General: No focal deficit present.      Mental Status: He is alert and oriented to person, place, and time.   Psychiatric:         Mood and Affect: Mood normal.         Behavior: Behavior normal.         Thought Content: Thought content normal.         Judgment: Judgment normal.           CRANIAL NERVES     CN III, IV, VI   Pupils are equal, round, and reactive to light.       Significant Labs: All pertinent labs within the past 24 hours have been reviewed.    Significant Imaging: I have reviewed all pertinent imaging results/findings within the past 24 hours.

## 2022-01-05 NOTE — ASSESSMENT & PLAN NOTE
--admit to telemetry, observation status  --due to SANTA patient will not be able to undergo MR angio of the head and neck with contrast  --case discussed with Dr. Ramos on-call neurologist, he recommends noncontrast MRA of the brain and carotid ultrasound of the neck  --pureed diet while we await speech language pathology evaluation  --lipid panel  --check hemoglobin A1c  --permissive hypertension times 24 hours  --neurology consult

## 2022-01-05 NOTE — ASSESSMENT & PLAN NOTE
--patient has normal kidney function at baseline  --fluid resuscitation with lactated Ringer's due to borderline hypernatremia

## 2022-01-05 NOTE — Clinical Note
Is this patient a high probability for COVID-19?: Yes   Diagnosis: Cerebrovascular accident (CVA), unspecified mechanism [[id number]]   Future Attending Provider: JOE DOSS [[id number]   Admitting Provider:: JOE DOSS [[id number]]

## 2022-01-05 NOTE — H&P
[facility] - Emergency Dept.  [facility] Hospital Medicine  History & Physical    Patient Name: Javier Barrera  MRN: [id number]  Patient Class: OP- Observation  Admission Date: 1/5/2022  Attending Physician: Robbin Rizzo Jr., MD   Primary Care Provider: Primary Doctor No         Patient information was obtained from patient and ER records.     Subjective:     Principal Problem:Acute CVA (cerebrovascular accident)    Chief Complaint:   Chief Complaint   Patient presents with    Cerebrovascular Accident     Pt presented to ED with c/o possible stroke, pt was seen this morning for an MRI and was contacted and told to go to the closest ED for further evaluation for CVA         HPI: Mr. Morfin is a 66-year-old male with a past medical history of prediabetes and hyperlipidemia sent to ER by his neuro ophthalmologist due to acute CVA seen today on outpatient MRI.  Mr. Barrera has been having vision loss or scotoma in the last few weeks which prompted referral to ophthalmologist.  MRI today shows subacute right occipital and left cerebellar strokes.    Workup:  -white blood cell count 9.6  -Hemoglobin 11.8  Creatinine 2.6 (baseline 1.3 December 2021)  -GFR 24  -vital signs:  Temperature 97.9°, heart rate 93, respiratory rate 16, blood pressure 145/67, oxygen saturation 96% on room air    In the ER he received Toradol injection and was admitted for further evaluation and treatment of multiple posterior strokes.  At the time of this interview and physical exam Mr. Morfin is complaining of dizziness as well as itching in his nose, umbilical area and right groin which he is scratching compulsively there are open wounds in right groin and umbilical area.  He denies headache, chest pain, shortness of breath, nausea, vomiting, diarrhea denies falls.      No past medical history on file.    No past surgical history on file.    Review of patient's allergies indicates:   Allergen Reactions    Penicillins Swelling    Atorvastatin Other  (See Comments)       Current Facility-Administered Medications on File Prior to Encounter   Medication    [COMPLETED] gadobutroL (GADAVIST) injection 10 mL     Current Outpatient Medications on File Prior to Encounter   Medication Sig    amLODIPine (NORVASC) 10 MG tablet Take 10 mg by mouth once daily.    furosemide (LASIX) 40 MG tablet Take 40 mg by mouth every morning.    gabapentin (NEURONTIN) 300 MG capsule Take 1 capsule (300 mg total) by mouth every evening.    hydroCHLOROthiazide (HYDRODIURIL) 25 MG tablet Take 25 mg by mouth once daily.    lisinopriL (PRINIVIL,ZESTRIL) 20 MG tablet Take 20 mg by mouth once daily.    metFORMIN (GLUCOPHAGE) 500 MG tablet Take 500 mg by mouth 2 (two) times daily with meals.    potassium chloride SA (K-DUR,KLOR-CON) 20 MEQ tablet Take 20 mEq by mouth once daily.    [DISCONTINUED] amLODIPine (NORVASC) 5 MG tablet Take 10 mg by mouth.    [DISCONTINUED] compress.stocking,knee,reg,med Misc 2 each by Misc.(Non-Drug; Combo Route) route Daily. Wear during the day.    [DISCONTINUED] cyclobenzaprine (FLEXERIL) 5 MG tablet Take 1 tablet (5 mg total) by mouth 3 (three) times daily as needed for Muscle spasms.    [DISCONTINUED] diclofenac (VOLTAREN) 50 MG EC tablet Take 1 tablet (50 mg total) by mouth 3 (three) times daily as needed.    [DISCONTINUED] naproxen (NAPROSYN) 500 MG tablet Take 500 mg by mouth.    [DISCONTINUED] potassium chloride (KLOR-CON) 20 mEq Pack Take 20 mEq by mouth.    [DISCONTINUED] predniSONE (DELTASONE) 10 MG tablet Take 1 tablet (10 mg total) by mouth once daily. Take 4 tabs x 3 days, then  Take 2 tabs x 3 days, then   Take 1 tab x 3 days.    [DISCONTINUED] tiZANidine (ZANAFLEX) 4 MG tablet tizanidine 4 mg tablet   TAKE 1 TABLET BY MOUTH EVERY 6 HOURS AS NEEDED    [DISCONTINUED] traMADoL (ULTRAM) 50 mg tablet tramadol 50 mg tablet   TAKE 1 TABLET BY MOUTH EVERY 6 HOURS AS NEEDED     Family History    None       Tobacco Use    Smoking status:  Unknown If Ever Smoked    Smokeless tobacco: Not on file   Substance and Sexual Activity    Alcohol use: Not Currently    Drug use: Never    Sexual activity: Not Currently     Review of Systems   Constitutional: Negative for fever.   HENT: Negative for congestion.    Respiratory: Negative for cough.    Gastrointestinal: Negative for abdominal pain.   Endocrine: Negative for polyuria.   Genitourinary: Negative for flank pain.   Musculoskeletal: Negative for back pain.   Skin: Negative for rash.   Neurological: Positive for dizziness. Negative for syncope.   Psychiatric/Behavioral: Negative for confusion.     Objective:     Vital Signs (Most Recent):  Temp: 97.9 °F (36.6 °C) (01/05/22 1114)  Pulse: 97 (01/05/22 1601)  Resp: 16 (01/05/22 1114)  BP: 134/62 (01/05/22 1601)  SpO2: 97 % (01/05/22 1601) Vital Signs (24h Range):  Temp:  [97.9 °F (36.6 °C)] 97.9 °F (36.6 °C)  Pulse:  [83-97] 97  Resp:  [16] 16  SpO2:  [96 %-99 %] 97 %  BP: ()/() 134/62     Weight: 95.2 kg (209 lb 14.1 oz)  Body mass index is 33.88 kg/m².    Physical Exam  Constitutional:       General: He is not in acute distress.  HENT:      Head: Normocephalic and atraumatic.      Mouth/Throat:      Mouth: Mucous membranes are moist.   Eyes:      General: No scleral icterus.     Extraocular Movements: Extraocular movements intact.      Pupils: Pupils are equal, round, and reactive to light.   Cardiovascular:      Rate and Rhythm: Normal rate and regular rhythm.      Heart sounds: No murmur heard.      Pulmonary:      Effort: Pulmonary effort is normal.      Breath sounds: No wheezing or rales.   Abdominal:      General: Bowel sounds are normal. There is no distension.      Palpations: Abdomen is soft.      Tenderness: There is no abdominal tenderness.   Musculoskeletal:         General: No swelling or deformity.      Cervical back: Normal range of motion and neck supple.      Right lower leg: No edema.      Left lower leg: No edema.    Skin:     General: Skin is warm and dry.      Findings: Lesion present. No rash.      Comments: Excoriated lesions left lateral forearm, umbilical area has 2 cm lesion which patient has scratched open ulcer, linear skin tears right   Neurological:      General: No focal deficit present.      Mental Status: He is alert and oriented to person, place, and time.   Psychiatric:         Mood and Affect: Mood normal.         Behavior: Behavior normal.         Thought Content: Thought content normal.         Judgment: Judgment normal.           CRANIAL NERVES     CN III, IV, VI   Pupils are equal, round, and reactive to light.       Significant Labs: All pertinent labs within the past 24 hours have been reviewed.    Significant Imaging: I have reviewed all pertinent imaging results/findings within the past 24 hours.    Assessment/Plan:     * Acute CVA (cerebrovascular accident)  --admit to telemetry, observation status  --due to SANTA patient will not be able to undergo MR angio of the head and neck with contrast  --case discussed with Dr. Ramos on-call neurologist, he recommends noncontrast MRA of the brain and carotid ultrasound of the neck  --pureed diet while we await speech language pathology evaluation  --lipid panel  --check hemoglobin A1c  --permissive hypertension times 24 hours  --neurology consult      SANTA (acute kidney injury)  --patient has normal kidney function at baseline  --fluid resuscitation with lactated Ringer's due to borderline hypernatremia      Tinea cruris  --topical nystatin      Compulsive scratching behavior        Prediabetes  --check hemoglobin A1c  --sliding scale insulin low intensity      Mixed hyperlipidemia  --patient is allergic to statin will start niacin instead        VTE Risk Mitigation (From admission, onward)         Ordered     heparin (porcine) injection 5,000 Units  Every 8 hours         01/05/22 1630     IP VTE HIGH RISK PATIENT  Once         01/05/22 1630     Place  sequential compression device  Until discontinued         01/05/22 1195                   Elvis Elizondo MD  Department of Hospital Medicine   [facility] - Emergency Dept.

## 2022-01-05 NOTE — ED PROVIDER NOTES
SCRIBE #1 NOTE: I, Don Pascual, am scribing for, and in the presence of, Robbin Rizzo Jr., MD. I have scribed the entire note.      History     Chief Complaint   Patient presents with    Cerebrovascular Accident     Pt presented to ED with c/o possible stroke, pt was seen this morning for an MRI and was contacted and told to go to the closest ED for further evaluation for CVA      Review of patient's allergies indicates:   Allergen Reactions    Penicillins Swelling    Atorvastatin Other (See Comments)         History of Present Illness     HPI    1/5/2022, 11:24 AM  History obtained from the patient      History of Present Illness: Javier Barrera is a 66 y.o. male patient who presents to the Emergency Department for evaluation of CVA. Pt reports that he has been experiencing vision problems for the last 2 months. He also has been having generalized weakness and myalgias, which are worst in his legs. Pt had an ALICIA performed at [facility] this morning and was told to report to the ED after his results were interpreted. Pt was diagnosed with COVID 5 days ago. Symptoms are constant and moderate in severity. No mitigating or exacerbating factors reported. Patient denies any fever, chills, N/V, abdominal pain, dizziness, and all other sxs at this time. No further complaints or concerns at this time.      Arrival mode: Personal vehicle    PCP: Primary Doctor No        Past Medical History:  No past medical history on file.    Past Surgical History:  No past surgical history on file.      Family History:  No family history on file.    Social History:  Social History     Tobacco Use    Smoking status: Unknown If Ever Smoked    Smokeless tobacco: Not on file   Substance and Sexual Activity    Alcohol use: Not Currently    Drug use: Never    Sexual activity: Not Currently        Review of Systems     Review of Systems   Constitutional: Negative for fever.   HENT: Negative for congestion and sore throat.     Eyes: Positive for visual disturbance.   Respiratory: Negative for cough and shortness of breath.    Cardiovascular: Negative for chest pain.   Gastrointestinal: Negative for abdominal pain, diarrhea, nausea and vomiting.   Genitourinary: Negative for dysuria.   Musculoskeletal: Positive for myalgias (generalized). Negative for back pain.   Skin: Negative for rash.   Neurological: Positive for weakness (generalized, worse in legs) and numbness (bilateral hand numbness). Negative for dizziness.   Hematological: Does not bruise/bleed easily.   All other systems reviewed and are negative.     Physical Exam     Initial Vitals [01/05/22 1114]   BP Pulse Resp Temp SpO2   (!) 101/54 90 16 97.9 °F (36.6 °C) 97 %      MAP       --          Physical Exam  Nursing Notes and Vital Signs Reviewed.  Constitutional: Patient is in no acute distress. Well-developed and well-nourished.  Head: Atraumatic. Normocephalic.  Eyes: PERRL. EOM intact. Conjunctivae are not pale. No scleral icterus.  ENT: Mucous membranes are moist. Oropharynx is clear and symmetric.    Neck: Supple. Full ROM. No lymphadenopathy.  Cardiovascular: Regular rate. Regular rhythm. No murmurs, rubs, or gallops. Distal pulses are 2+ and symmetric.  Pulmonary/Chest: No respiratory distress. Clear to auscultation bilaterally. No wheezing or rales.  Abdominal: Soft and non-distended.  There is no tenderness.  No rebound, guarding, or rigidity. Good bowel sounds.  Genitourinary: No CVA tenderness  Musculoskeletal: Moves all extremities. No obvious deformities. No edema. No calf tenderness.  Skin: Warm and dry.  Neurological:  Alert, awake, and appropriate.  Normal speech.  No acute focal neurological deficits are appreciated.  Psychiatric: Normal affect. Good eye contact. Appropriate in content.     ED Course   Procedures  ED Vital Signs:  Vitals:    01/05/22 1114 01/05/22 1217 01/05/22 1219 01/05/22 1303   BP: (!) 101/54 114/63  (!) 98/54   Pulse: 90 87 85 86   Resp:  "16      Temp: 97.9 °F (36.6 °C)      TempSrc: Oral      SpO2: 97%  97% 99%   Weight: 95.2 kg (209 lb 14.1 oz)      Height: 5' 6" (1.676 m)        Abnormal Lab Results:  Labs Reviewed   CBC W/ AUTO DIFFERENTIAL - Abnormal; Notable for the following components:       Result Value    RBC 3.96 (*)     Hemoglobin 11.8 (*)     Hematocrit 36.3 (*)     Immature Granulocytes 1.6 (*)     Immature Grans (Abs) 0.15 (*)     All other components within normal limits   COMPREHENSIVE METABOLIC PANEL - Abnormal; Notable for the following components:    Potassium 5.3 (*)     BUN 62 (*)     Creatinine 2.6 (*)     eGFR if  28 (*)     eGFR if non  24 (*)     All other components within normal limits        All Lab Results:  Results for orders placed or performed during the hospital encounter of 01/05/22   CBC auto differential   Result Value Ref Range    WBC 9.63 3.90 - 12.70 K/uL    RBC 3.96 (L) 4.60 - 6.20 M/uL    Hemoglobin 11.8 (L) 14.0 - 18.0 g/dL    Hematocrit 36.3 (L) 40.0 - 54.0 %    MCV 92 82 - 98 fL    MCH 29.8 27.0 - 31.0 pg    MCHC 32.5 32.0 - 36.0 g/dL    RDW 13.0 11.5 - 14.5 %    Platelets 302 150 - 450 K/uL    MPV 10.6 9.2 - 12.9 fL    Immature Granulocytes 1.6 (H) 0.0 - 0.5 %    Gran # (ANC) 6.4 1.8 - 7.7 K/uL    Immature Grans (Abs) 0.15 (H) 0.00 - 0.04 K/uL    Lymph # 1.9 1.0 - 4.8 K/uL    Mono # 1.0 0.3 - 1.0 K/uL    Eos # 0.2 0.0 - 0.5 K/uL    Baso # 0.05 0.00 - 0.20 K/uL    nRBC 0 0 /100 WBC    Gran % 65.9 38.0 - 73.0 %    Lymph % 19.8 18.0 - 48.0 %    Mono % 10.6 4.0 - 15.0 %    Eosinophil % 1.6 0.0 - 8.0 %    Basophil % 0.5 0.0 - 1.9 %    Differential Method Automated    Comprehensive metabolic panel   Result Value Ref Range    Sodium 140 136 - 145 mmol/L    Potassium 5.3 (H) 3.5 - 5.1 mmol/L    Chloride 105 95 - 110 mmol/L    CO2 27 23 - 29 mmol/L    Glucose 83 70 - 110 mg/dL    BUN 62 (H) 8 - 23 mg/dL    Creatinine 2.6 (H) 0.5 - 1.4 mg/dL    Calcium 9.2 8.7 - 10.5 mg/dL    Total " Protein 6.7 6.0 - 8.4 g/dL    Albumin 3.6 3.5 - 5.2 g/dL    Total Bilirubin 0.3 0.1 - 1.0 mg/dL    Alkaline Phosphatase 76 55 - 135 U/L    AST 33 10 - 40 U/L    ALT 35 10 - 44 U/L    Anion Gap 8 8 - 16 mmol/L    eGFR if African American 28 (A) >60 mL/min/1.73 m^2    eGFR if non African American 24 (A) >60 mL/min/1.73 m^2     The EKG was ordered, reviewed, and independently interpreted by the ED provider.  Interpretation time: 11:37  Rate: 83 BPM  Rhythm: normal sinus rhythm  Interpretation: ST and T wave abnormality, consider inferolateral ischemia. No STEMI.           The Emergency Provider reviewed the vital signs and test results, which are outlined above.     ED Discussion     1:07 PM: Discussed case with Pennie Estrella NP ([facility]). Dr. Alvarez agrees with current care and management of pt and accepts admission.   Admitting Service: Hospital Medicine  Admitting Physician: Dr. Alvarez  Admit to: Obs Tele    1:08 PM: Re-evaluated pt. I have discussed test results, shared treatment plan, and the need for admission with patient and family at bedside. Pt and family express understanding at this time and agree with all information. All questions answered. Pt and family have no further questions or concerns at this time. Pt is ready for admit.       Medical Decision Making:   Clinical Tests:   Lab Tests: Ordered and Reviewed  Medical Tests: Ordered and Reviewed           ED Medication(s):  Medications   ketorolac injection 15 mg (15 mg Intravenous Given 1/5/22 1226)       New Prescriptions    No medications on file               Scribe Attestation:   Scribe #1: I performed the above scribed service and the documentation accurately describes the services I performed. I attest to the accuracy of the note.     Attending:   Physician Attestation Statement for Scribe #1: I, Robbin Rizzo Jr., MD, personally performed the services described in this documentation, as scribed by Don Pascual, in my presence, and  it is both accurate and complete.           Clinical Impression       ICD-10-CM ICD-9-CM   1. Cerebrovascular accident (CVA), unspecified mechanism  I63.9 434.91   2. CVA (cerebral vascular accident)  I63.9 434.91   3. COVID-19 virus infection  U07.1 079.89       Disposition:   Disposition: Placed in Observation  Condition: López Rizzo Jr., MD  01/05/22 1311

## 2022-01-06 PROBLEM — M50.90 CERVICAL DISC DISEASE: Status: ACTIVE | Noted: 2022-01-06

## 2022-01-06 PROBLEM — U07.1 COVID-19 VIRUS DETECTED: Status: ACTIVE | Noted: 2022-01-06

## 2022-01-06 LAB
ALBUMIN SERPL BCP-MCNC: 3.2 G/DL (ref 3.5–5.2)
ALP SERPL-CCNC: 66 U/L (ref 55–135)
ALT SERPL W/O P-5'-P-CCNC: 26 U/L (ref 10–44)
ANION GAP SERPL CALC-SCNC: 6 MMOL/L (ref 8–16)
ANION GAP SERPL CALC-SCNC: 8 MMOL/L (ref 8–16)
AORTIC ROOT ANNULUS: 3.18 CM
APTT BLDCRRT: 23.9 SEC (ref 21–32)
ASCENDING AORTA: 3.26 CM
AST SERPL-CCNC: 25 U/L (ref 10–40)
AV INDEX (PROSTH): 0.58
AV MEAN GRADIENT: 12 MMHG
AV PEAK GRADIENT: 24 MMHG
AV VALVE AREA: 1.56 CM2
AV VELOCITY RATIO: 0.57
BASOPHILS # BLD AUTO: 0.04 K/UL (ref 0–0.2)
BASOPHILS NFR BLD: 0.5 % (ref 0–1.9)
BILIRUB SERPL-MCNC: 0.4 MG/DL (ref 0.1–1)
BSA FOR ECHO PROCEDURE: 2.06 M2
BUN SERPL-MCNC: 35 MG/DL (ref 8–23)
BUN SERPL-MCNC: 47 MG/DL (ref 8–23)
CALCIUM SERPL-MCNC: 9.4 MG/DL (ref 8.7–10.5)
CALCIUM SERPL-MCNC: 9.7 MG/DL (ref 8.7–10.5)
CHLORIDE SERPL-SCNC: 106 MMOL/L (ref 95–110)
CHLORIDE SERPL-SCNC: 109 MMOL/L (ref 95–110)
CHOLEST SERPL-MCNC: 151 MG/DL (ref 120–199)
CHOLEST/HDLC SERPL: 5.8 {RATIO} (ref 2–5)
CO2 SERPL-SCNC: 25 MMOL/L (ref 23–29)
CO2 SERPL-SCNC: 27 MMOL/L (ref 23–29)
CREAT SERPL-MCNC: 1.2 MG/DL (ref 0.5–1.4)
CREAT SERPL-MCNC: 1.5 MG/DL (ref 0.5–1.4)
CRP SERPL-MCNC: 30.1 MG/L (ref 0–8.2)
CV ECHO LV RWT: 0.89 CM
D DIMER PPP IA.FEU-MCNC: 1.54 MG/L FEU
DIFFERENTIAL METHOD: ABNORMAL
DOP CALC AO PEAK VEL: 2.47 M/S
DOP CALC AO VTI: 41.6 CM
DOP CALC LVOT AREA: 2.7 CM2
DOP CALC LVOT DIAMETER: 1.85 CM
DOP CALC LVOT PEAK VEL: 1.42 M/S
DOP CALC LVOT STROKE VOLUME: 65.02 CM3
DOP CALC RVOT PEAK VEL: 0.83 M/S
DOP CALC RVOT VTI: 15.4 CM
DOP CALCLVOT PEAK VEL VTI: 24.2 CM
E WAVE DECELERATION TIME: 196.13 MSEC
E/A RATIO: 0.7
E/E' RATIO: 8.3 M/S
ECHO EF ESTIMATED: 53 %
ECHO LV POSTERIOR WALL: 1.7 CM (ref 0.6–1.1)
EJECTION FRACTION: 60 %
EOSINOPHIL # BLD AUTO: 0.2 K/UL (ref 0–0.5)
EOSINOPHIL NFR BLD: 2.1 % (ref 0–8)
ERYTHROCYTE [DISTWIDTH] IN BLOOD BY AUTOMATED COUNT: 12.9 % (ref 11.5–14.5)
EST. GFR  (AFRICAN AMERICAN): 55 ML/MIN/1.73 M^2
EST. GFR  (AFRICAN AMERICAN): >60 ML/MIN/1.73 M^2
EST. GFR  (NON AFRICAN AMERICAN): 47 ML/MIN/1.73 M^2
EST. GFR  (NON AFRICAN AMERICAN): >60 ML/MIN/1.73 M^2
ESTIMATED AVG GLUCOSE: 128 MG/DL (ref 68–131)
FERRITIN SERPL-MCNC: 147 NG/ML (ref 20–300)
FRACTIONAL SHORTENING: 27 % (ref 28–44)
GLUCOSE SERPL-MCNC: 103 MG/DL (ref 70–110)
GLUCOSE SERPL-MCNC: 105 MG/DL (ref 70–110)
HBA1C MFR BLD: 6.1 % (ref 4–5.6)
HCT VFR BLD AUTO: 38.1 % (ref 40–54)
HDLC SERPL-MCNC: 26 MG/DL (ref 40–75)
HDLC SERPL: 17.2 % (ref 20–50)
HGB BLD-MCNC: 12.1 G/DL (ref 14–18)
IMM GRANULOCYTES # BLD AUTO: 0.16 K/UL (ref 0–0.04)
IMM GRANULOCYTES NFR BLD AUTO: 1.8 % (ref 0–0.5)
INR PPP: 0.9 (ref 0.8–1.2)
INTERVENTRICULAR SEPTUM: 1.65 CM (ref 0.6–1.1)
IVC DIAMETER: 1.29 CM
IVRT: 77.07 MSEC
LA MAJOR: 4.32 CM
LA MINOR: 2.52 CM
LA WIDTH: 2.49 CM
LDH SERPL L TO P-CCNC: 246 U/L (ref 110–260)
LDLC SERPL CALC-MCNC: 90.6 MG/DL (ref 63–159)
LEFT ATRIUM SIZE: 2.83 CM
LEFT ATRIUM VOLUME INDEX: 9.5 ML/M2
LEFT ATRIUM VOLUME: 19.07 CM3
LEFT INTERNAL DIMENSION IN SYSTOLE: 2.77 CM (ref 2.1–4)
LEFT VENTRICLE DIASTOLIC VOLUME INDEX: 30.95 ML/M2
LEFT VENTRICLE DIASTOLIC VOLUME: 61.89 ML
LEFT VENTRICLE MASS INDEX: 130 G/M2
LEFT VENTRICLE SYSTOLIC VOLUME INDEX: 14.4 ML/M2
LEFT VENTRICLE SYSTOLIC VOLUME: 28.87 ML
LEFT VENTRICULAR INTERNAL DIMENSION IN DIASTOLE: 3.8 CM (ref 3.5–6)
LEFT VENTRICULAR MASS: 259.06 G
LV LATERAL E/E' RATIO: 8.3 M/S
LV SEPTAL E/E' RATIO: 8.3 M/S
LVOT MG: 4.47 MMHG
LVOT MV: 0.99 CM/S
LYMPHOCYTES # BLD AUTO: 2 K/UL (ref 1–4.8)
LYMPHOCYTES NFR BLD: 22.1 % (ref 18–48)
MAGNESIUM SERPL-MCNC: 2 MG/DL (ref 1.6–2.6)
MCH RBC QN AUTO: 30 PG (ref 27–31)
MCHC RBC AUTO-ENTMCNC: 31.8 G/DL (ref 32–36)
MCV RBC AUTO: 95 FL (ref 82–98)
MONOCYTES # BLD AUTO: 0.9 K/UL (ref 0.3–1)
MONOCYTES NFR BLD: 10.4 % (ref 4–15)
MV PEAK A VEL: 1.18 M/S
MV PEAK E VEL: 0.83 M/S
MV STENOSIS PRESSURE HALF TIME: 56.88 MS
MV VALVE AREA P 1/2 METHOD: 3.87 CM2
NEUTROPHILS # BLD AUTO: 5.6 K/UL (ref 1.8–7.7)
NEUTROPHILS NFR BLD: 63.1 % (ref 38–73)
NONHDLC SERPL-MCNC: 125 MG/DL
NRBC BLD-RTO: 0 /100 WBC
PHOSPHATE SERPL-MCNC: 2.6 MG/DL (ref 2.7–4.5)
PLATELET # BLD AUTO: 298 K/UL (ref 150–450)
PMV BLD AUTO: 10.8 FL (ref 9.2–12.9)
POCT GLUCOSE: 102 MG/DL (ref 70–110)
POCT GLUCOSE: 109 MG/DL (ref 70–110)
POCT GLUCOSE: 113 MG/DL (ref 70–110)
POTASSIUM SERPL-SCNC: 5.1 MMOL/L (ref 3.5–5.1)
POTASSIUM SERPL-SCNC: 6 MMOL/L (ref 3.5–5.1)
PROT SERPL-MCNC: 6.7 G/DL (ref 6–8.4)
PROTHROMBIN TIME: 9.8 SEC (ref 9–12.5)
PV MEAN GRADIENT: 1.52 MMHG
RA MAJOR: 2.98 CM
RA PRESSURE: 3 MMHG
RA WIDTH: 2.05 CM
RBC # BLD AUTO: 4.03 M/UL (ref 4.6–6.2)
SINUS: 3.35 CM
SODIUM SERPL-SCNC: 139 MMOL/L (ref 136–145)
SODIUM SERPL-SCNC: 142 MMOL/L (ref 136–145)
STJ: 3.25 CM
TDI LATERAL: 0.1 M/S
TDI SEPTAL: 0.1 M/S
TDI: 0.1 M/S
TRICUSPID ANNULAR PLANE SYSTOLIC EXCURSION: 2.74 CM
TRIGL SERPL-MCNC: 172 MG/DL (ref 30–150)
TROPONIN I SERPL DL<=0.01 NG/ML-MCNC: 0.01 NG/ML (ref 0–0.03)
WBC # BLD AUTO: 8.86 K/UL (ref 3.9–12.7)

## 2022-01-06 PROCEDURE — 27000207 HC ISOLATION

## 2022-01-06 PROCEDURE — 84100 ASSAY OF PHOSPHORUS: CPT | Performed by: FAMILY MEDICINE

## 2022-01-06 PROCEDURE — 36415 COLL VENOUS BLD VENIPUNCTURE: CPT | Performed by: FAMILY MEDICINE

## 2022-01-06 PROCEDURE — 85730 THROMBOPLASTIN TIME PARTIAL: CPT | Performed by: FAMILY MEDICINE

## 2022-01-06 PROCEDURE — 84484 ASSAY OF TROPONIN QUANT: CPT | Performed by: FAMILY MEDICINE

## 2022-01-06 PROCEDURE — 84466 ASSAY OF TRANSFERRIN: CPT | Performed by: INTERNAL MEDICINE

## 2022-01-06 PROCEDURE — 85610 PROTHROMBIN TIME: CPT | Performed by: FAMILY MEDICINE

## 2022-01-06 PROCEDURE — 63600175 PHARM REV CODE 636 W HCPCS: Performed by: FAMILY MEDICINE

## 2022-01-06 PROCEDURE — 97530 THERAPEUTIC ACTIVITIES: CPT

## 2022-01-06 PROCEDURE — 82746 ASSAY OF FOLIC ACID SERUM: CPT | Performed by: INTERNAL MEDICINE

## 2022-01-06 PROCEDURE — 83615 LACTATE (LD) (LDH) ENZYME: CPT | Performed by: INTERNAL MEDICINE

## 2022-01-06 PROCEDURE — 36415 COLL VENOUS BLD VENIPUNCTURE: CPT | Performed by: INTERNAL MEDICINE

## 2022-01-06 PROCEDURE — 21400001 HC TELEMETRY ROOM

## 2022-01-06 PROCEDURE — 92610 EVALUATE SWALLOWING FUNCTION: CPT

## 2022-01-06 PROCEDURE — 85025 COMPLETE CBC W/AUTO DIFF WBC: CPT | Performed by: FAMILY MEDICINE

## 2022-01-06 PROCEDURE — 85379 FIBRIN DEGRADATION QUANT: CPT | Performed by: INTERNAL MEDICINE

## 2022-01-06 PROCEDURE — 25000003 PHARM REV CODE 250: Performed by: INTERNAL MEDICINE

## 2022-01-06 PROCEDURE — 99232 SBSQ HOSP IP/OBS MODERATE 35: CPT | Mod: CR,,, | Performed by: PSYCHIATRY & NEUROLOGY

## 2022-01-06 PROCEDURE — 92523 SPEECH SOUND LANG COMPREHEN: CPT

## 2022-01-06 PROCEDURE — 97166 OT EVAL MOD COMPLEX 45 MIN: CPT

## 2022-01-06 PROCEDURE — 80053 COMPREHEN METABOLIC PANEL: CPT | Performed by: FAMILY MEDICINE

## 2022-01-06 PROCEDURE — 99205 PR OFFICE/OUTPT VISIT, NEW, LEVL V, 60-74 MIN: ICD-10-PCS | Mod: 25,,, | Performed by: INTERNAL MEDICINE

## 2022-01-06 PROCEDURE — 86038 ANTINUCLEAR ANTIBODIES: CPT | Performed by: INTERNAL MEDICINE

## 2022-01-06 PROCEDURE — 83735 ASSAY OF MAGNESIUM: CPT | Performed by: FAMILY MEDICINE

## 2022-01-06 PROCEDURE — 25000003 PHARM REV CODE 250: Performed by: FAMILY MEDICINE

## 2022-01-06 PROCEDURE — 80048 BASIC METABOLIC PNL TOTAL CA: CPT | Performed by: INTERNAL MEDICINE

## 2022-01-06 PROCEDURE — 86140 C-REACTIVE PROTEIN: CPT | Performed by: INTERNAL MEDICINE

## 2022-01-06 PROCEDURE — 82607 VITAMIN B-12: CPT | Performed by: INTERNAL MEDICINE

## 2022-01-06 PROCEDURE — 99232 PR SUBSEQUENT HOSPITAL CARE,LEVL II: ICD-10-PCS | Mod: CR,,, | Performed by: PSYCHIATRY & NEUROLOGY

## 2022-01-06 PROCEDURE — 97161 PT EVAL LOW COMPLEX 20 MIN: CPT

## 2022-01-06 PROCEDURE — 63700000 PHARM REV CODE 250 ALT 637 W/O HCPCS: Performed by: INTERNAL MEDICINE

## 2022-01-06 PROCEDURE — 82728 ASSAY OF FERRITIN: CPT | Performed by: INTERNAL MEDICINE

## 2022-01-06 PROCEDURE — 99205 OFFICE O/P NEW HI 60 MIN: CPT | Mod: 25,,, | Performed by: INTERNAL MEDICINE

## 2022-01-06 RX ORDER — HYDROCODONE BITARTRATE AND ACETAMINOPHEN 5; 325 MG/1; MG/1
1 TABLET ORAL ONCE
Status: COMPLETED | OUTPATIENT
Start: 2022-01-06 | End: 2022-01-06

## 2022-01-06 RX ORDER — GABAPENTIN 300 MG/1
300 CAPSULE ORAL 2 TIMES DAILY
Status: DISCONTINUED | OUTPATIENT
Start: 2022-01-06 | End: 2022-01-08 | Stop reason: HOSPADM

## 2022-01-06 RX ORDER — PRAVASTATIN SODIUM 20 MG/1
40 TABLET ORAL NIGHTLY
Status: DISCONTINUED | OUTPATIENT
Start: 2022-01-06 | End: 2022-01-06

## 2022-01-06 RX ORDER — IBUPROFEN 200 MG
24 TABLET ORAL
Status: DISCONTINUED | OUTPATIENT
Start: 2022-01-06 | End: 2022-01-08 | Stop reason: HOSPADM

## 2022-01-06 RX ORDER — INSULIN ASPART 100 [IU]/ML
0-5 INJECTION, SOLUTION INTRAVENOUS; SUBCUTANEOUS
Status: DISCONTINUED | OUTPATIENT
Start: 2022-01-06 | End: 2022-01-08 | Stop reason: HOSPADM

## 2022-01-06 RX ORDER — HYDROCODONE BITARTRATE AND ACETAMINOPHEN 5; 325 MG/1; MG/1
1 TABLET ORAL EVERY 6 HOURS PRN
Status: DISCONTINUED | OUTPATIENT
Start: 2022-01-06 | End: 2022-01-08 | Stop reason: HOSPADM

## 2022-01-06 RX ORDER — ACETAMINOPHEN 500 MG
5000 TABLET ORAL DAILY
Status: DISCONTINUED | OUTPATIENT
Start: 2022-01-07 | End: 2022-01-08 | Stop reason: HOSPADM

## 2022-01-06 RX ORDER — CLOPIDOGREL BISULFATE 75 MG/1
75 TABLET ORAL DAILY
Status: DISCONTINUED | OUTPATIENT
Start: 2022-01-06 | End: 2022-01-08 | Stop reason: HOSPADM

## 2022-01-06 RX ORDER — FLUCONAZOLE 100 MG/1
200 TABLET ORAL DAILY
Status: DISCONTINUED | OUTPATIENT
Start: 2022-01-06 | End: 2022-01-08 | Stop reason: HOSPADM

## 2022-01-06 RX ORDER — SODIUM CHLORIDE 450 MG/100ML
INJECTION, SOLUTION INTRAVENOUS CONTINUOUS
Status: DISCONTINUED | OUTPATIENT
Start: 2022-01-06 | End: 2022-01-06

## 2022-01-06 RX ORDER — IBUPROFEN 200 MG
16 TABLET ORAL
Status: DISCONTINUED | OUTPATIENT
Start: 2022-01-06 | End: 2022-01-08 | Stop reason: HOSPADM

## 2022-01-06 RX ORDER — ASCORBIC ACID 500 MG
500 TABLET ORAL DAILY
Status: DISCONTINUED | OUTPATIENT
Start: 2022-01-07 | End: 2022-01-08 | Stop reason: HOSPADM

## 2022-01-06 RX ORDER — GLUCAGON 1 MG
1 KIT INJECTION
Status: DISCONTINUED | OUTPATIENT
Start: 2022-01-06 | End: 2022-01-08 | Stop reason: HOSPADM

## 2022-01-06 RX ORDER — NYSTATIN AND TRIAMCINOLONE ACETONIDE 100000; 1 [USP'U]/G; MG/G
OINTMENT TOPICAL 2 TIMES DAILY
Status: DISCONTINUED | OUTPATIENT
Start: 2022-01-06 | End: 2022-01-08 | Stop reason: HOSPADM

## 2022-01-06 RX ADMIN — SODIUM CHLORIDE: 0.45 INJECTION, SOLUTION INTRAVENOUS at 12:01

## 2022-01-06 RX ADMIN — SODIUM ZIRCONIUM CYCLOSILICATE 10 G: 5 POWDER, FOR SUSPENSION ORAL at 08:01

## 2022-01-06 RX ADMIN — GABAPENTIN 300 MG: 300 CAPSULE ORAL at 08:01

## 2022-01-06 RX ADMIN — HEPARIN SODIUM 5000 UNITS: 5000 INJECTION INTRAVENOUS; SUBCUTANEOUS at 09:01

## 2022-01-06 RX ADMIN — MICONAZOLE NITRATE: 20 CREAM TOPICAL at 08:01

## 2022-01-06 RX ADMIN — AMLODIPINE BESYLATE 10 MG: 10 TABLET ORAL at 08:01

## 2022-01-06 RX ADMIN — HEPARIN SODIUM 5000 UNITS: 5000 INJECTION INTRAVENOUS; SUBCUTANEOUS at 04:01

## 2022-01-06 RX ADMIN — Medication 1000 MG: at 08:01

## 2022-01-06 RX ADMIN — HEPARIN SODIUM 5000 UNITS: 5000 INJECTION INTRAVENOUS; SUBCUTANEOUS at 05:01

## 2022-01-06 RX ADMIN — NYSTATIN AND TRIAMCINOLONE ACETONIDE: 100000; 1 OINTMENT TOPICAL at 08:01

## 2022-01-06 RX ADMIN — HYDROCODONE BITARTRATE AND ACETAMINOPHEN 1 TABLET: 5; 325 TABLET ORAL at 12:01

## 2022-01-06 RX ADMIN — FLUCONAZOLE 200 MG: 100 TABLET ORAL at 04:01

## 2022-01-06 RX ADMIN — GABAPENTIN 300 MG: 300 CAPSULE ORAL at 12:01

## 2022-01-06 RX ADMIN — HYDROCODONE BITARTRATE AND ACETAMINOPHEN 1 TABLET: 5; 325 TABLET ORAL at 08:01

## 2022-01-06 RX ADMIN — CLOPIDOGREL 75 MG: 75 TABLET, FILM COATED ORAL at 12:01

## 2022-01-06 RX ADMIN — ASPIRIN 81 MG: 81 TABLET, COATED ORAL at 08:01

## 2022-01-06 NOTE — PROGRESS NOTES
Pharmacist Renal Dose Adjustment Note    Javier Barrera is a 66 y.o. male being treated with the medication Fluconazole    Patient Data:    Vital Signs (Most Recent):  Temp: 98.1 °F (36.7 °C) (01/06/22 1223)  Pulse: 84 (01/06/22 1223)  Resp: 18 (01/06/22 1253)  BP: (!) 148/69 (01/06/22 1223)  SpO2: 95 % (01/06/22 1223)   Vital Signs (72h Range):  Temp:  [97.9 °F (36.6 °C)-99 °F (37.2 °C)]   Pulse:  [73-97]   Resp:  [16-20]   BP: ()/()   SpO2:  [93 %-99 %]      Recent Labs   Lab 01/05/22  1216 01/06/22  0448 01/06/22  1230   CREATININE 2.6* 1.5* 1.2     Serum creatinine: 1.2 mg/dL 01/06/22 1230  Estimated creatinine clearance: 63.2 mL/min    Medication:Fluconazole dose: 100mg frequency q24h will be changed to medication:Fluconazole dose:200mg frequency:q24h    Pharmacist's Name: Clark Freeman  Pharmacist's Extension: [phone]

## 2022-01-06 NOTE — SUBJECTIVE & OBJECTIVE
Interval History:   Neurology consult obtained and suggested JAVAN and CTA head/Neck. Cardiology suggested to continue dual antiplatelet and statin and JAVAN and/or Loop recorder as out patient once pt is COVID free. AC not initiated at this time. CTA head and neck will be done tomorrow once kidney function is favorable. ST cleared pt for regular diet. PT/OT suggested out patient treatment. Gentle hydration continued for SANTA.      Review of Systems   Constitutional: Negative for activity change, appetite change and fever.   HENT: Negative for congestion and sore throat.    Eyes: Positive for visual disturbance.   Respiratory: Negative for cough, chest tightness and shortness of breath.    Cardiovascular: Negative for chest pain, palpitations and leg swelling.   Gastrointestinal: Negative for abdominal distention, abdominal pain, constipation, diarrhea, nausea and vomiting.   Endocrine: Negative for polyuria.   Genitourinary: Negative for decreased urine volume, dysuria, flank pain, frequency and hematuria.   Musculoskeletal: Negative for back pain and gait problem.   Skin: Negative for rash.   Neurological: Positive for dizziness. Negative for syncope, speech difficulty, weakness, light-headedness and headaches.   Psychiatric/Behavioral: Negative for confusion, hallucinations and sleep disturbance.     Objective:     Vital Signs (Most Recent):  Temp: 98.1 °F (36.7 °C) (01/06/22 1223)  Pulse: 84 (01/06/22 1223)  Resp: 18 (01/06/22 1253)  BP: (!) 148/69 (01/06/22 1223)  SpO2: 95 % (01/06/22 1223) Vital Signs (24h Range):  Temp:  [98.1 °F (36.7 °C)-99 °F (37.2 °C)] 98.1 °F (36.7 °C)  Pulse:  [73-97] 84  Resp:  [18-20] 18  SpO2:  [93 %-99 %] 95 %  BP: (105-148)/(50-78) 148/69     Weight: 91.2 kg (201 lb)  Body mass index is 32.44 kg/m².    Intake/Output Summary (Last 24 hours) at 1/6/2022 1420  Last data filed at 1/6/2022 0800  Gross per 24 hour   Intake --   Output 750 ml   Net -750 ml      Physical Exam  Constitutional:        General: He is not in acute distress.     Appearance: He is well-developed and well-nourished. He is not diaphoretic.   HENT:      Head: Normocephalic and atraumatic.      Mouth/Throat:      Pharynx: No oropharyngeal exudate.   Eyes:      Extraocular Movements: EOM normal.      Conjunctiva/sclera: Conjunctivae normal.      Pupils: Pupils are equal, round, and reactive to light.   Neck:      Thyroid: No thyromegaly.      Vascular: No JVD.   Cardiovascular:      Rate and Rhythm: Normal rate and regular rhythm.      Heart sounds: Normal heart sounds. No murmur heard.      Pulmonary:      Effort: Pulmonary effort is normal. No respiratory distress.      Breath sounds: Normal breath sounds. No wheezing or rales.   Chest:      Chest wall: No tenderness.   Abdominal:      General: Bowel sounds are normal. There is no distension.      Palpations: Abdomen is soft.      Tenderness: There is no abdominal tenderness. There is no guarding or rebound.   Musculoskeletal:         General: No edema. Normal range of motion.      Cervical back: Normal range of motion and neck supple.   Lymphadenopathy:      Cervical: No cervical adenopathy.   Skin:     General: Skin is warm and dry.      Findings: No rash.   Neurological:      Mental Status: He is alert and oriented to person, place, and time.      Cranial Nerves: No cranial nerve deficit.      Sensory: No sensory deficit.      Deep Tendon Reflexes: Reflexes normal.   Psychiatric:         Mood and Affect: Mood and affect normal.         Significant Labs:   All pertinent labs within the past 24 hours have been reviewed.  BMP:   Recent Labs   Lab 01/06/22  0448 01/06/22  0448 01/06/22  1230      < > 103      < > 139   K 6.0*   < > 5.1      < > 106   CO2 27   < > 25   BUN 47*   < > 35*   CREATININE 1.5*   < > 1.2   CALCIUM 9.4   < > 9.7   MG 2.0  --   --     < > = values in this interval not displayed.     CBC:   Recent Labs   Lab 01/05/22  1216 01/06/22 0448    WBC 9.63 8.86   HGB 11.8* 12.1*   HCT 36.3* 38.1*    298     CMP:   Recent Labs   Lab 01/05/22  1216 01/06/22  0448 01/06/22  1230    142 139   K 5.3* 6.0* 5.1    109 106   CO2 27 27 25   GLU 83 105 103   BUN 62* 47* 35*   CREATININE 2.6* 1.5* 1.2   CALCIUM 9.2 9.4 9.7   PROT 6.7 6.7  --    ALBUMIN 3.6 3.2*  --    BILITOT 0.3 0.4  --    ALKPHOS 76 66  --    AST 33 25  --    ALT 35 26  --    ANIONGAP 8 6* 8   EGFRNONAA 24* 47* >60       Significant Imaging:

## 2022-01-06 NOTE — ASSESSMENT & PLAN NOTE
--patient has normal kidney function at baseline  --fluid resuscitation with lactated Ringer's due to borderline hypernatremia  1/6/22-  -Renal function is improving

## 2022-01-06 NOTE — ASSESSMENT & PLAN NOTE
- Pt c/o neck pain and headaches improved with Neurontin and analgesics   -Further evaluation and treatment as outpatient

## 2022-01-06 NOTE — PLAN OF CARE
OT eval completed. Patient independent with all mobility and ADLs. Visual deficits appropriate to be addressed via OPOT. Continued skilled acute OT intervention not warranted.

## 2022-01-06 NOTE — ASSESSMENT & PLAN NOTE
Cont asa, plavix, statin  If high suspicion of embolic stroke recommend anticoagulation with Eliquis or coumadin  Would defer JAVAN and/or Loop recorder until patient is COVID free post hospitalization   2D ECHO without any obvious veg/thrombus on valves  Monitor here on telemetry for AFib/flutter

## 2022-01-06 NOTE — ASSESSMENT & PLAN NOTE
--patient is allergic to statin will start niacin instead  -Pt states he tried different statin and does not want to try again

## 2022-01-06 NOTE — PROGRESS NOTES
[facility] Hospital Medicine  Progress Note    Patient Name: Javier Barrera  MRN: [id number]  Patient Class: OP- Observation   Admission Date: 1/5/2022  Length of Stay: 0 days  Attending Physician: Jasmyne Alvarez MD  Primary Care Provider: Primary Doctor No        Subjective:     Principal Problem:Acute CVA (cerebrovascular accident)        HPI:  Mr. Morfin is a 66-year-old male with a past medical history of prediabetes and hyperlipidemia sent to ER by his neuro ophthalmologist due to acute CVA seen today on outpatient MRI.  Mr. Barrera has been having vision loss or scotoma in the last few weeks which prompted referral to ophthalmologist.  MRI today shows subacute right occipital and left cerebellar strokes.    Workup:  -white blood cell count 9.6  -Hemoglobin 11.8  Creatinine 2.6 (baseline 1.3 December 2021)  -GFR 24  -vital signs:  Temperature 97.9°, heart rate 93, respiratory rate 16, blood pressure 145/67, oxygen saturation 96% on room air    In the ER he received Toradol injection and was admitted for further evaluation and treatment of multiple posterior strokes.  At the time of this interview and physical exam Mr. Morfin is complaining of dizziness as well as itching in his nose, umbilical area and right groin which he is scratching compulsively there are open wounds in right groin and umbilical area.  He denies headache, chest pain, shortness of breath, nausea, vomiting, diarrhea denies falls.      Overview/Hospital Course:  Pt with H/O HTN, DM, cervical radiculopathy, carpal tunnel syndrome bilaterally, former  Smoker admitted for further evaluation and treatment of acute CVA involving the left cerebellar hemisphere and right occipital lobe. Pt with c/o 2 -3 mos h/o visual disturbance and intermittent dizziness. 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 Of note, pt tested positive for COVID-19  on 12/31/21.     1/6/22- Pt started on ASA, Plavix and statin . Neurology consult obtained and suggested JAVAN and CTA head/Neck. Cardiology suggested to continue dual antiplatelet and statin and JAVAN and/or Loop recorder as out patient once pt is COVID free. AC not initiated at this time. CTA head and neck will be done tomorrow once kidney function is favorable. ST cleared pt for regular diet. PT/OT suggested out patient treatment. Gentle hydration continued for SANTA.      Interval History:   Neurology consult obtained and suggested JAVAN and CTA head/Neck. Cardiology suggested to continue dual antiplatelet and statin and JAVAN and/or Loop recorder as out patient once pt is COVID free. AC not initiated at this time. CTA head and neck will be done tomorrow once kidney function is favorable. ST cleared pt for regular diet. PT/OT suggested out patient treatment. Gentle hydration continued for SANTA.      Review of Systems   Constitutional: Negative for activity change, appetite change and fever.   HENT: Negative for congestion and sore throat.    Eyes: Positive for visual disturbance.   Respiratory: Negative for cough, chest tightness and shortness of breath.    Cardiovascular: Negative for chest pain, palpitations and leg swelling.   Gastrointestinal: Negative for abdominal distention, abdominal pain, constipation, diarrhea, nausea and vomiting.   Endocrine: Negative for polyuria.   Genitourinary: Negative for decreased urine volume, dysuria, flank pain, frequency and hematuria.   Musculoskeletal: Negative for back pain and gait problem.   Skin: Negative for rash.   Neurological: Positive for dizziness. Negative for syncope, speech difficulty, weakness, light-headedness and headaches.   Psychiatric/Behavioral: Negative for confusion, hallucinations and sleep disturbance.     Objective:     Vital Signs (Most Recent):  Temp: 98.1 °F (36.7 °C) (01/06/22 1223)  Pulse: 84  (01/06/22 1223)  Resp: 18 (01/06/22 1253)  BP: (!) 148/69 (01/06/22 1223)  SpO2: 95 % (01/06/22 1223) Vital Signs (24h Range):  Temp:  [98.1 °F (36.7 °C)-99 °F (37.2 °C)] 98.1 °F (36.7 °C)  Pulse:  [73-97] 84  Resp:  [18-20] 18  SpO2:  [93 %-99 %] 95 %  BP: (105-148)/(50-78) 148/69     Weight: 91.2 kg (201 lb)  Body mass index is 32.44 kg/m².    Intake/Output Summary (Last 24 hours) at 1/6/2022 1420  Last data filed at 1/6/2022 0800  Gross per 24 hour   Intake --   Output 750 ml   Net -750 ml      Physical Exam  Constitutional:       General: He is not in acute distress.     Appearance: He is well-developed and well-nourished. He is not diaphoretic.   HENT:      Head: Normocephalic and atraumatic.      Mouth/Throat:      Pharynx: No oropharyngeal exudate.   Eyes:      Extraocular Movements: EOM normal.      Conjunctiva/sclera: Conjunctivae normal.      Pupils: Pupils are equal, round, and reactive to light.   Neck:      Thyroid: No thyromegaly.      Vascular: No JVD.   Cardiovascular:      Rate and Rhythm: Normal rate and regular rhythm.      Heart sounds: Normal heart sounds. No murmur heard.      Pulmonary:      Effort: Pulmonary effort is normal. No respiratory distress.      Breath sounds: Normal breath sounds. No wheezing or rales.   Chest:      Chest wall: No tenderness.   Abdominal:      General: Bowel sounds are normal. There is no distension.      Palpations: Abdomen is soft.      Tenderness: There is no abdominal tenderness. There is no guarding or rebound.   Musculoskeletal:         General: No edema. Normal range of motion.      Cervical back: Normal range of motion and neck supple.   Lymphadenopathy:      Cervical: No cervical adenopathy.   Skin:     General: Skin is warm and dry.      Findings: No rash.   Neurological:      Mental Status: He is alert and oriented to person, place, and time.      Cranial Nerves: No cranial nerve deficit.      Sensory: No sensory deficit.      Deep Tendon Reflexes:  Reflexes normal.   Psychiatric:         Mood and Affect: Mood and affect normal.         Significant Labs:   All pertinent labs within the past 24 hours have been reviewed.  BMP:   Recent Labs   Lab 01/06/22 0448 01/06/22 0448 01/06/22  1230      < > 103      < > 139   K 6.0*   < > 5.1      < > 106   CO2 27   < > 25   BUN 47*   < > 35*   CREATININE 1.5*   < > 1.2   CALCIUM 9.4   < > 9.7   MG 2.0  --   --     < > = values in this interval not displayed.     CBC:   Recent Labs   Lab 01/05/22 1216 01/06/22 0448   WBC 9.63 8.86   HGB 11.8* 12.1*   HCT 36.3* 38.1*    298     CMP:   Recent Labs   Lab 01/05/22 1216 01/06/22 0448 01/06/22  1230    142 139   K 5.3* 6.0* 5.1    109 106   CO2 27 27 25   GLU 83 105 103   BUN 62* 47* 35*   CREATININE 2.6* 1.5* 1.2   CALCIUM 9.2 9.4 9.7   PROT 6.7 6.7  --    ALBUMIN 3.6 3.2*  --    BILITOT 0.3 0.4  --    ALKPHOS 76 66  --    AST 33 25  --    ALT 35 26  --    ANIONGAP 8 6* 8   EGFRNONAA 24* 47* >60       Significant Imaging:       Assessment/Plan:      * Acute CVA (cerebrovascular accident)  --admit to telemetry, observation status  --due to SANTA patient will not be able to undergo MR angio of the head and neck with contrast  --case discussed with Dr. Ramos on-call neurologist, he recommends noncontrast MRA of the brain and carotid ultrasound of the neck  --pureed diet while we await speech language pathology evaluation  --lipid panel  --check hemoglobin A1c  --permissive hypertension times 24 hours  --neurology consult    1/6/22-  -Pt started on ASA, Plavix and statin .   -Neurology consult obtained and suggested JAVAN and CTA head/Neck.   -Cardiology suggested to continue dual antiplatelet and statin and JAVAN and/or Loop recorder as out patient once pt is COVID free. AC not initiated at this time.   -CTA head and neck will be done tomorrow once kidney function is favorable.   -ST cleared pt for regular diet.   -PT/OT suggested out  patient treatment.   -Gentle hydration continued for SANTA.      SANTA (acute kidney injury)  --patient has normal kidney function at baseline  --fluid resuscitation with lactated Ringer's due to borderline hypernatremia  1/6/22-  -Renal function is improving         Prediabetes  --check hemoglobin A1c  --sliding scale insulin low intensity      Mixed hyperlipidemia  --patient is allergic to statin will start niacin instead  -Pt states he tried different statin and does not want to try again       Tinea cruris  -Topical antifungal and oral Diflucan       Cervical disc disease  - Pt c/o neck pain and headaches improved with Neurontin and analgesics   -Further evaluation and treatment as outpatient       Compulsive scratching behavior        COVID-19 virus detected  -Pt is fairly asymptomatic from COVID -19 virus infection stand point.  -Remains on RA, denies SOB  -CXR with clear lungs   -Inflammatory markers are favorable       VTE Risk Mitigation (From admission, onward)         Ordered     heparin (porcine) injection 5,000 Units  Every 8 hours         01/05/22 1630     IP VTE HIGH RISK PATIENT  Once         01/05/22 1630     Place sequential compression device  Until discontinued         01/05/22 1630                Discharge Planning   OTIS:      Code Status: Full Code   Is the patient medically ready for discharge?:     Reason for patient still in hospital (select all that apply): Patient trending condition                     Jasmyne Alvarez MD  Department of Hospital Medicine   [facility] - Telemetry ([facility])

## 2022-01-06 NOTE — PLAN OF CARE
Pt arrived to unit via wheel chair.  Admitted from ED for CVA.    Cardiac monitor # [id number] applied & verified w/ monitor room.   Educated on using call light; purpose of white board; importance of monitoring I/Os; pharmacy bedside delivery.   AOx4. Able to verbalize needs & follow commands.   POC reviewed w/ pt; interventions implemented as appropriate.    Calm & cooperative at this time.  VSS; SR on tele-monitor.  On room air.  PIV patent. Receiving IV LR at 75 mL/hr.   Altered skin integrity. Wounds to naval, 2nd toe left foot, left buttocks & BUE; rash to groin.  WC consult placed. Four eyes on skin w/ NORMA Corrigan.  Denies pain.   Heparin SQ for vte.  POCT glucose AC/HS. No SSI required.  Ambulates independently. Activity ad arpita.    Urinal w/in reach.      Able to reposition self in bed. Frequent position changes encouraged. Educated on s/sx of  DTI.  NADN. Resting quietly in bed. Free of falls. Hourly rounding complete.   All safety measures remain in place. SR up x2; bed low & locked. Call light w/in reach.   Will continue to monitor throughout shift.

## 2022-01-06 NOTE — SUBJECTIVE & OBJECTIVE
No past medical history on file.    No past surgical history on file.    Review of patient's allergies indicates:   Allergen Reactions    Penicillins Swelling    Atorvastatin Other (See Comments)       No current facility-administered medications on file prior to encounter.     Current Outpatient Medications on File Prior to Encounter   Medication Sig    amLODIPine (NORVASC) 10 MG tablet Take 10 mg by mouth once daily.    furosemide (LASIX) 40 MG tablet Take 40 mg by mouth every morning.    gabapentin (NEURONTIN) 300 MG capsule Take 1 capsule (300 mg total) by mouth every evening.    hydroCHLOROthiazide (HYDRODIURIL) 25 MG tablet Take 25 mg by mouth once daily.    lisinopriL (PRINIVIL,ZESTRIL) 20 MG tablet Take 20 mg by mouth once daily.    metFORMIN (GLUCOPHAGE) 500 MG tablet Take 500 mg by mouth 2 (two) times daily with meals.    potassium chloride SA (K-DUR,KLOR-CON) 20 MEQ tablet Take 20 mEq by mouth once daily.     Family History    None       Tobacco Use    Smoking status: Unknown If Ever Smoked    Smokeless tobacco: Not on file   Substance and Sexual Activity    Alcohol use: Not Currently    Drug use: Never    Sexual activity: Not Currently     Review of Systems   Unable to perform ROS: acuity of condition     Objective:     Vital Signs (Most Recent):  Temp: 98.1 °F (36.7 °C) (01/06/22 1223)  Pulse: 84 (01/06/22 1223)  Resp: 18 (01/06/22 1223)  BP: (!) 148/69 (01/06/22 1223)  SpO2: 95 % (01/06/22 1223) Vital Signs (24h Range):  Temp:  [98.1 °F (36.7 °C)-99 °F (37.2 °C)] 98.1 °F (36.7 °C)  Pulse:  [73-97] 84  Resp:  [18-20] 18  SpO2:  [93 %-99 %] 95 %  BP: ()/() 148/69     Weight: 91.2 kg (201 lb)  Body mass index is 32.44 kg/m².    SpO2: 95 %  O2 Device (Oxygen Therapy): room air      Intake/Output Summary (Last 24 hours) at 1/6/2022 1242  Last data filed at 1/6/2022 0800  Gross per 24 hour   Intake --   Output 750 ml   Net -750 ml       Lines/Drains/Airways     Peripheral  Intravenous Line                 Peripheral IV - Single Lumen 01/05/22 1220 18 G Left Antecubital 1 day                Physical Exam  Vitals and nursing note reviewed.   Constitutional:       Comments: Physical exam not completed due to COVID         Significant Labs:   All pertinent lab results from the last 24 hours have been reviewed. and   Recent Lab Results       01/06/22  0959   01/06/22  0550   01/06/22  0448   01/05/22  1656   01/05/22  1652        Albumin     3.2           Alkaline Phosphatase     66           ALT     26           Anion Gap     6           Ao root annulus 3.18               Ascending aorta 3.26               Ao peak harinder 2.47               Ao VTI 41.6               Appearance, UA         Clear       aPTT     23.9  Comment: aPTT therapeutic range = 39-69 seconds           AST     25           AV valve area 1.56               AV mean gradient 12               AV index (prosthetic) 0.58               AV peak gradient 24               AV Velocity Ratio 0.57               Baso #     0.04           Basophil %     0.5           Bilirubin (UA)         Negative       BILIRUBIN TOTAL     0.4  Comment: For infants and newborns, interpretation of results should be based  on gestational age, weight and in agreement with clinical  observations.    Premature Infant recommended reference ranges:  Up to 24 hours.............<8.0 mg/dL  Up to 48 hours............<12.0 mg/dL  3-5 days..................<15.0 mg/dL  6-29 days.................<15.0 mg/dL             BSA 2.06               BUN     47           Calcium     9.4           Chloride     109           Cholesterol               CO2     27           Color, UA         Yellow       Creatinine     1.5           Left Ventricle Relative Wall Thickness 0.89               Differential Method     Automated           E/A ratio 0.70               Echo EF Estimated 53               E/E' ratio 8.30               eGFR if      55           eGFR if non       47  Comment: Calculation used to obtain the estimated glomerular filtration  rate (eGFR) is the CKD-EPI equation.              EF 60               Eos #     0.2           Eosinophil %     2.1           Estimated Avg Glucose               E wave deceleration time 196.39225402117015               FS 27               Glucose     105           Glucose, UA         Negative       Gran # (ANC)     5.6           Gran %     63.1           HDL               HDL/Cholesterol Ratio               Hematocrit     38.1           Hemoglobin     12.1           Hemoglobin A1C External               Immature Grans (Abs)     0.16  Comment: Mild elevation in immature granulocytes is non specific and   can be seen in a variety of conditions including stress response,   acute inflammation, trauma and pregnancy. Correlation with other   laboratory and clinical findings is essential.             Immature Granulocytes     1.8           INR     0.9  Comment: Coumadin Therapy:  2.0 - 3.0 for INR for all indicators except mechanical heart valves  and antiphospholipid syndromes which should use 2.5 - 3.5.             IVC diameter 1.29               IVRT [id number]               IVS 1.65               Ketones, UA         Negative       LA WIDTH 2.49               Left Atrium Major Axis 4.32               Left Atrium Minor Axis 2.52               LA size 2.83               LA volume 19.07               LA Volume Index 9.5               LVOT area 2.7               LDL Cholesterol External               Leukocytes, UA         Negative       LV LATERAL E/E' RATIO 8.30               LV SEPTAL E/E' RATIO 8.30               LV Diastolic Volume 61.89               LV Diastolic Volume Index 30.95               LVIDd 3.80               LVIDs 2.77               LV mass 259.06               LV Mass Index 130               Left Ventricular Outflow Tract Mean Gradient 4.47               Left Ventricular Outflow Tract Mean Velocity  0.23865195766009               LVOT diameter 1.85               LVOT peak chato 1.42               LVOT stroke volume 65.02               LVOT peak VTI 24.20               LV Systolic Volume 28.87               LV Systolic Volume Index 14.4               Lymph #     2.0           Lymph %     22.1           Magnesium     2.0           MCH     30.0           MCHC     31.8           MCV     95           Mean e' 0.10               Mono #     0.9           Mono %     10.4           MPV     10.8           MV valve area p 1/2 method 3.87               MV Peak A Chato 1.18               MV Peak E Chato 0.83               MV stenosis pressure 1/2 time 56.511229902285               NITRITE UA         Negative       Non-HDL Cholesterol               nRBC     0           Occult Blood UA         Negative       pH, UA         6.0       Phosphorus     2.6           Platelets     298           POCT Glucose   109     109         Potassium     6.0           PROTEIN TOTAL     6.7           Protein, UA         Negative  Comment: Recommend a 24 hour urine protein or a urine   protein/creatinine ratio if globulin induced proteinuria is  clinically suspected.         Protime     9.8           PV mean gradient 1.52               Posterior Wall 1.70               Right Atrial Pressure (from IVC) 3               RA Major Axis 2.98               RA Width 2.05               RBC     4.03           RDW     12.9           RVOT peak chato 0.83               RVOT peak VTI 15.4               Sinus 3.35               Sodium     142           Specific [place], UA         1.025       Specimen UA         Urine, Clean Catch       STJ 3.25               TAPSE 2.74               TDI SEPTAL 0.10               TDI LATERAL 0.10               Total Cholesterol/HDL Ratio               Triglycerides               Troponin I     0.009  Comment: The reference interval for Troponin I represents the 99th percentile   cutoff   for our facility and is consistent with 3rd  generation assay   performance.             TSH               UROBILINOGEN UA         Negative       WBC     8.86                            01/05/22  1648        Albumin       Alkaline Phosphatase       ALT       Anion Gap       Ao root annulus       Ascending aorta       Ao peak harinder       Ao VTI       Appearance, UA       aPTT       AST       AV valve area       AV mean gradient       AV index (prosthetic)       AV peak gradient       AV Velocity Ratio       Baso #       Basophil %       Bilirubin (UA)       BILIRUBIN TOTAL       BSA       BUN       Calcium       Chloride       Cholesterol 151  Comment: The National Cholesterol Education Program (NCEP) has set the  following guidelines (reference ranges) for Cholesterol:  Optimal.....................<200 mg/dL  Borderline High.............200-239 mg/dL  High........................> or = 240 mg/dL         CO2       Color, UA       Creatinine       Left Ventricle Relative Wall Thickness       Differential Method       E/A ratio       Echo EF Estimated       E/E' ratio       eGFR if        eGFR if non        EF       Eos #       Eosinophil %       Estimated Avg Glucose 128       E wave deceleration time       FS       Glucose       Glucose, UA       Gran # (ANC)       Gran %       HDL 26  Comment: The National Cholesterol Education Program (NCEP) has set the  following guidelines (reference values) for HDL Cholesterol:  Low...............<40 mg/dL  Optimal...........>60 mg/dL         HDL/Cholesterol Ratio 17.2       Hematocrit       Hemoglobin       Hemoglobin A1C External 6.1  Comment: ADA Screening Guidelines:  5.7-6.4%  Consistent with prediabetes  >or=6.5%  Consistent with diabetes    High levels of fetal hemoglobin interfere with the HbA1C  assay. Heterozygous hemoglobin variants (HbS, HgC, etc)do  not significantly interfere with this assay.   However, presence of multiple variants may affect accuracy.         Immature Grans  (Abs)       Immature Granulocytes       INR       IVC diameter       IVRT       IVS       Ketones, UA       LA WIDTH       Left Atrium Major Axis       Left Atrium Minor Axis       LA size       LA volume       LA Volume Index       LVOT area       LDL Cholesterol External 90.6  Comment: The National Cholesterol Education Program (NCEP) has set the  following guidelines (reference values) for LDL Cholesterol:  Optimal.......................<130 mg/dL  Borderline High...............130-159 mg/dL  High..........................160-189 mg/dL  Very High.....................>190 mg/dL         Leukocytes, UA       LV LATERAL E/E' RATIO       LV SEPTAL E/E' RATIO       LV Diastolic Volume       LV Diastolic Volume Index       LVIDd       LVIDs       LV mass       LV Mass Index       Left Ventricular Outflow Tract Mean Gradient       Left Ventricular Outflow Tract Mean Velocity       LVOT diameter       LVOT peak chato       LVOT stroke volume       LVOT peak VTI       LV Systolic Volume       LV Systolic Volume Index       Lymph #       Lymph %       Magnesium       MCH       MCHC       MCV       Mean e'       Mono #       Mono %       MPV       MV valve area p 1/2 method       MV Peak A Chato       MV Peak E Chato       MV stenosis pressure 1/2 time       NITRITE UA       Non-HDL Cholesterol 125  Comment: Risk category and Non-HDL cholesterol goals:  Coronary heart disease (CHD)or equivalent (10-year risk of CHD >20%):  Non-HDL cholesterol goal     <130 mg/dL  Two or more CHD risk factors and 10-year risk of CHD <= 20%:  Non-HDL cholesterol goal     <160 mg/dL  0 to 1 CHD risk factor:  Non-HDL cholesterol goal     <190 mg/dL         nRBC       Occult Blood UA       pH, UA       Phosphorus       Platelets       POCT Glucose       Potassium       PROTEIN TOTAL       Protein, UA       Protime       PV mean gradient       Posterior Wall       Right Atrial Pressure (from IVC)       RA Major Axis       RA Width       RBC       RDW        RVOT peak harinder       RVOT peak VTI       Sinus       Sodium       Specific Gravity, UA       Specimen UA       STJ       TAPSE       TDI SEPTAL       TDI LATERAL       Total Cholesterol/HDL Ratio 5.8       Triglycerides 172  Comment: The National Cholesterol Education Program (NCEP) has set the  following guidelines (reference values) for triglycerides:  Normal......................<150 mg/dL  Borderline High.............150-199 mg/dL  High........................200-499 mg/dL         Troponin I       TSH 0.603       UROBILINOGEN UA       WBC             Significant Imaging: Echocardiogram:   Transthoracic echo (TTE) complete (Cupid Only):   Results for orders placed or performed during the hospital encounter of 01/05/22   Echo Saline Bubble? Yes   Result Value Ref Range    BSA 2.06 m2    TDI SEPTAL 0.10 m/s    LV LATERAL E/E' RATIO 8.30 m/s    LV SEPTAL E/E' RATIO 8.30 m/s    LA WIDTH 2.49 cm    IVC diameter 1.29 cm    Left Ventricular Outflow Tract Mean Velocity 0.48718586394323 cm/s    Left Ventricular Outflow Tract Mean Gradient 4.47 mmHg    TDI LATERAL 0.10 m/s    LVIDd 3.80 3.5 - 6.0 cm    IVS 1.65 (A) 0.6 - 1.1 cm    Posterior Wall 1.70 (A) 0.6 - 1.1 cm    Ao root annulus 3.18 cm    LVIDs 2.77 2.1 - 4.0 cm    FS 27 28 - 44 %    LA volume 19.07 cm3    Sinus 3.35 cm    STJ 3.25 cm    Ascending aorta 3.26 cm    LV mass 259.06 g    LA size 2.83 cm    TAPSE 2.74 cm    Left Ventricle Relative Wall Thickness 0.89 cm    AV mean gradient 12 mmHg    AV valve area 1.56 cm2    AV Velocity Ratio 0.57     AV index (prosthetic) 0.58     MV valve area p 1/2 method 3.87 cm2    E/A ratio 0.70     Mean e' 0.10 m/s    E wave deceleration time 196.51187122210867 msec    IVRT 77.633347645356336 msec    LVOT diameter 1.85 cm    LVOT area 2.7 cm2    LVOT peak harinder 1.42 m/s    LVOT peak VTI 24.20 cm    Ao peak harinder 2.47 m/s    Ao VTI 41.6 cm    RVOT peak harinder 0.83 m/s    RVOT peak VTI 15.4 cm    LVOT stroke volume 65.02 cm3    AV peak  gradient 24 mmHg    PV mean gradient 1.52 mmHg    E/E' ratio 8.30 m/s    MV Peak E Chato 0.83 m/s    MV stenosis pressure 1/2 time 56.598202219894 ms    MV Peak A Chato 1.18 m/s    LV Systolic Volume 28.87 mL    LV Systolic Volume Index 14.4 mL/m2    LV Diastolic Volume 61.89 mL    LV Diastolic Volume Index 30.95 mL/m2    LA Volume Index 9.5 mL/m2    LV Mass Index 130 g/m2    Echo EF Estimated 53 %    RA Major Axis 2.98 cm    Left Atrium Minor Axis 2.52 cm    Left Atrium Major Axis 4.32 cm    RA Width 2.05 cm    Right Atrial Pressure (from IVC) 3 mmHg    EF 60 %    Narrative    · The left ventricle is normal in size with concentric hypertrophy and   normal systolic function.  · The estimated ejection fraction is 60%.  · Normal left ventricular diastolic function.  · Normal right ventricular size with normal right ventricular systolic   function.  · There is mild aortic valve stenosis.  · Aortic valve area is 1.56 cm2; peak velocity is 2.47 m/s; mean gradient   is 12 mmHg.  · Normal central venous pressure (3 mmHg).

## 2022-01-06 NOTE — HOSPITAL COURSE
Pt with H/O HTN, DM, cervical radiculopathy, carpal tunnel syndrome bilaterally, former  Smoker admitted for further evaluation and treatment of acute CVA involving the left cerebellar hemisphere and right occipital lobe. Pt with c/o 2 -3 mos h/o visual disturbance and intermittent dizziness. 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 Of note, pt tested positive for COVID-19  on 12/31/21.     1/6/22- Pt started on ASA, Plavix and statin . Neurology consult obtained and suggested JAVAN and CTA head/Neck. Cardiology suggested to continue dual antiplatelet and statin and JAVAN and/or Loop recorder as out patient once pt is COVID free. AC not initiated at this time. CTA head and neck will be done tomorrow once kidney function is favorable. ST cleared pt for regular diet. PT/OT suggested out patient treatment. Gentle hydration continued for SANTA.    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 Case D/W neurology which recommend  cont DAPT .     1/8 Pt was seen and examined at bedside . He was determined to be suitable for d/c   Case D/W Neurology which rec DAPT x 3 months    F/U neurovascular team at [facility]  Outp PT/OT

## 2022-01-06 NOTE — ASSESSMENT & PLAN NOTE
--admit to telemetry, observation status  --due to SANTA patient will not be able to undergo MR angio of the head and neck with contrast  --case discussed with Dr. Ramos on-call neurologist, he recommends noncontrast MRA of the brain and carotid ultrasound of the neck  --pureed diet while we await speech language pathology evaluation  --lipid panel  --check hemoglobin A1c  --permissive hypertension times 24 hours  --neurology consult    1/6/22-  -Pt started on ASA, Plavix and statin .   -Neurology consult obtained and suggested JAVAN and CTA head/Neck.   -Cardiology suggested to continue dual antiplatelet and statin and JAVAN and/or Loop recorder as out patient once pt is COVID free. AC not initiated at this time.   -CTA head and neck will be done tomorrow once kidney function is favorable.   -ST cleared pt for regular diet.   -PT/OT suggested out patient treatment.   -Gentle hydration continued for SANTA.

## 2022-01-06 NOTE — PT/OT/SLP EVAL
Speech Language Pathology Evaluation  Cognitive/Bedside Swallow    Patient Name:  Javier Barrera   MRN:  [id number]  Admitting Diagnosis: Acute CVA (cerebrovascular accident)    Recommendations:                  General Recommendations:  Follow-up not indicated  Diet recommendations:  Regular, Thin   Aspiration Precautions: HOB to 90 degrees and Remain upright 30 minutes post meal   General Precautions: Standard, vision impaired  Communication strategies:  none    History:     No past medical history on file.    No past surgical history on file.      Subjective   Pt admitted from home where he reports being Independent with all ADLs and works full time.  He has experienced visual field loss and intermittent dizziness over the last few months.  A brain MRI revealed Multiple small areas of recent infarction (acute or early subacute) involving the left cerebellar hemisphere and right occipital lobe as above.  No significant mass effect or associated hemorrhage.  Patient goals: none stated     Pain/Comfort:  · Pain Rating 1: 0/10    Respiratory Status: Room air    Objective:     Cognitive Status:    WFL      Receptive Language:   Comprehension:      WFL    Pragmatics:    WFL    Expressive Language:  Verbal:    WFL      Motor Speech:  WFL    Voice:   WFL       Oral Musculature Evaluation  · Oral Musculature: WFL  · Dentition: present and adequate    Bedside Swallow Eval:   Consistencies Assessed:  · Thin liquids and solids      Oral Phase:   · WFL    Pharyngeal Phase:   · no overt clinical signs/symptoms of aspiration  · no overt clinical signs/symptoms of pharyngeal dysphagia    Treatment:  Pt presents with visual field deficits that he describes as the Left lower and Right upper field cuts.  He reports being able to see well enough to continue working, driving, and completing his ADLs.  No obvious speech, language, or swallowing deficits.      Assessment:     Javier Barrera is a 66 y.o. male with a diagnosis of  The primary encounter diagnosis was Cerebrovascular accident (CVA), unspecified mechanism. 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  He presents with no acute speech, language, or swallowing deficits.  No further ST warranted at this time.       Plan:     · Plan of Care reviewed with:  patient   · SLP Follow-Up:  No       Discharge recommendations:  Discharge Facility/Level of Care Needs: home   Barriers to Discharge:  None    Time Tracking:     SLP Treatment Date:   01/06/22  Speech Start Time:  1110  Speech Stop Time:  1130     Speech Total Time (min):  20 min    Billable Minutes: Eval 10  and Eval Swallow and Oral Function 10    01/06/2022

## 2022-01-06 NOTE — HPI
From chart    Chief Complaint   Patient presents with    Cerebrovascular Accident       Pt presented to ED with c/o possible stroke, pt was seen this morning for an MRI and was contacted and told to go to the closest ED for further evaluation for CVA          HPI: Mr. Morfin is a 66-year-old male with a past medical history of prediabetes and hyperlipidemia sent to ER by his neuro ophthalmologist due to acute CVA seen today on outpatient MRI.  Mr. Barrera has been having vision loss or scotoma in the last few weeks which prompted referral to ophthalmologist.  MRI today shows subacute right occipital and left cerebellar strokes.     Workup:  -white blood cell count 9.6  -Hemoglobin 11.8  Creatinine 2.6 (baseline 1.3 December 2021)  -GFR 24  -vital signs:  Temperature 97.9°, heart rate 93, respiratory rate 16, blood pressure 145/67, oxygen saturation 96% on room air     In the ER he received Toradol injection and was admitted for further evaluation and treatment of multiple posterior strokes.  At the time of this interview and physical exam Mr. Morfin is complaining of dizziness as well as itching in his nose, umbilical area and right groin which he is scratching compulsively there are open wounds in right groin and umbilical area.  He denies headache, chest pain, shortness of breath, nausea, vomiting, diarrhea denies falls.      Cardiology consulted for further evaluate of CVA etiology per neuro requesting JAVAN.

## 2022-01-06 NOTE — ASSESSMENT & PLAN NOTE
-Pt is fairly asymptomatic from COVID -19 virus infection stand point.  -Remains on RA, denies SOB  -CXR with clear lungs   -Inflammatory markers are favorable

## 2022-01-06 NOTE — CONSULTS
[facility] - Tele[facility] (H[facility])  Cardiology  Consult Note    Patient Name: Javier Barrera  MRN: [id number]  Admission Date: 1/5/2022  Hospital Length of Stay: 0 days  Code Status: Full Code   Attending Provider: Jasmyne Alvarez MD   Consulting Provider: Riu Doherty Md, MD  Primary Care Physician: Primary Doctor No  Principal Problem:Acute CVA (cerebrovascular accident)    Patient information was obtained from past medical records, ER records and primary team.     Inpatient consult to Cardiology  Consult performed by: Rui Doherty MD  Consult ordered by: Jasmyne Alvarez MD  Reason for consult: CVA, further cardiac workup         Subjective:     Chief Complaint:  Vision loss     HPI:   From chart    Chief Complaint   Patient presents with    Cerebrovascular Accident       Pt presented to ED with c/o possible stroke, pt was seen this morning for an MRI and was contacted and told to go to the closest ED for further evaluation for CVA          HPI: Mr. Morfin is a 66-year-old male with a past medical history of prediabetes and hyperlipidemia sent to ER by his neuro ophthalmologist due to acute CVA seen today on outpatient MRI.  Mr. Barrera has been having vision loss or scotoma in the last few weeks which prompted referral to ophthalmologist.  MRI today shows subacute right occipital and left cerebellar strokes.     Workup:  -white blood cell count 9.6  -Hemoglobin 11.8  Creatinine 2.6 (baseline 1.3 December 2021)  -GFR 24  -vital signs:  Temperature 97.9°, heart rate 93, respiratory rate 16, blood pressure 145/67, oxygen saturation 96% on room air     In the ER he received Toradol injection and was admitted for further evaluation and treatment of multiple posterior strokes.  At the time of this interview and physical exam Mr. Morfin is complaining of dizziness as well as itching in his nose, umbilical area and right groin which he is scratching compulsively there are open wounds in right groin and umbilical area.  He  denies headache, chest pain, shortness of breath, nausea, vomiting, diarrhea denies falls.      Cardiology consulted for further evaluate of CVA etiology per neuro requesting JAVAN.         No past medical history on file.    No past surgical history on file.    Review of patient's allergies indicates:   Allergen Reactions    Penicillins Swelling    Atorvastatin Other (See Comments)       No current facility-administered medications on file prior to encounter.     Current Outpatient Medications on File Prior to Encounter   Medication Sig    amLODIPine (NORVASC) 10 MG tablet Take 10 mg by mouth once daily.    furosemide (LASIX) 40 MG tablet Take 40 mg by mouth every morning.    gabapentin (NEURONTIN) 300 MG capsule Take 1 capsule (300 mg total) by mouth every evening.    hydroCHLOROthiazide (HYDRODIURIL) 25 MG tablet Take 25 mg by mouth once daily.    lisinopriL (PRINIVIL,ZESTRIL) 20 MG tablet Take 20 mg by mouth once daily.    metFORMIN (GLUCOPHAGE) 500 MG tablet Take 500 mg by mouth 2 (two) times daily with meals.    potassium chloride SA (K-DUR,KLOR-CON) 20 MEQ tablet Take 20 mEq by mouth once daily.     Family History    None       Tobacco Use    Smoking status: Unknown If Ever Smoked    Smokeless tobacco: Not on file   Substance and Sexual Activity    Alcohol use: Not Currently    Drug use: Never    Sexual activity: Not Currently     Review of Systems   Unable to perform ROS: acuity of condition     Objective:     Vital Signs (Most Recent):  Temp: 98.1 °F (36.7 °C) (01/06/22 1223)  Pulse: 84 (01/06/22 1223)  Resp: 18 (01/06/22 1223)  BP: (!) 148/69 (01/06/22 1223)  SpO2: 95 % (01/06/22 1223) Vital Signs (24h Range):  Temp:  [98.1 °F (36.7 °C)-99 °F (37.2 °C)] 98.1 °F (36.7 °C)  Pulse:  [73-97] 84  Resp:  [18-20] 18  SpO2:  [93 %-99 %] 95 %  BP: ()/() 148/69     Weight: 91.2 kg (201 lb)  Body mass index is 32.44 kg/m².    SpO2: 95 %  O2 Device (Oxygen Therapy): room air      Intake/Output  Summary (Last 24 hours) at 1/6/2022 1242  Last data filed at 1/6/2022 0800  Gross per 24 hour   Intake --   Output 750 ml   Net -750 ml       Lines/Drains/Airways     Peripheral Intravenous Line                 Peripheral IV - Single Lumen 01/05/22 1220 18 G Left Antecubital 1 day                Physical Exam  Vitals and nursing note reviewed.   Constitutional:       Comments: Physical exam not completed due to COVID         Significant Labs:   All pertinent lab results from the last 24 hours have been reviewed. and   Recent Lab Results       01/06/22  0959   01/06/22  0550   01/06/22  0448   01/05/22  1656   01/05/22  1652        Albumin     3.2           Alkaline Phosphatase     66           ALT     26           Anion Gap     6           Ao root annulus 3.18               Ascending aorta 3.26               Ao peak harinder 2.47               Ao VTI 41.6               Appearance, UA         Clear       aPTT     23.9  Comment: aPTT therapeutic range = 39-69 seconds           AST     25           AV valve area 1.56               AV mean gradient 12               AV index (prosthetic) 0.58               AV peak gradient 24               AV Velocity Ratio 0.57               Baso #     0.04           Basophil %     0.5           Bilirubin (UA)         Negative       BILIRUBIN TOTAL     0.4  Comment: For infants and newborns, interpretation of results should be based  on gestational age, weight and in agreement with clinical  observations.    Premature Infant recommended reference ranges:  Up to 24 hours.............<8.0 mg/dL  Up to 48 hours............<12.0 mg/dL  3-5 days..................<15.0 mg/dL  6-29 days.................<15.0 mg/dL             BSA 2.06               BUN     47           Calcium     9.4           Chloride     109           Cholesterol               CO2     27           Color, UA         Yellow       Creatinine     1.5           Left Ventricle Relative Wall Thickness 0.89               Differential  Method     Automated           E/A ratio 0.70               Echo EF Estimated 53               E/E' ratio 8.30               eGFR if      55           eGFR if non      47  Comment: Calculation used to obtain the estimated glomerular filtration  rate (eGFR) is the CKD-EPI equation.              EF 60               Eos #     0.2           Eosinophil %     2.1           Estimated Avg Glucose               E wave deceleration time [id number]               FS 27               Glucose     105           Glucose, UA         Negative       Gran # (ANC)     5.6           Gran %     63.1           HDL               HDL/Cholesterol Ratio               Hematocrit     38.1           Hemoglobin     12.1           Hemoglobin A1C External               Immature Grans (Abs)     0.16  Comment: Mild elevation in immature granulocytes is non specific and   can be seen in a variety of conditions including stress response,   acute inflammation, trauma and pregnancy. Correlation with other   laboratory and clinical findings is essential.             Immature Granulocytes     1.8           INR     0.9  Comment: Coumadin Therapy:  2.0 - 3.0 for INR for all indicators except mechanical heart valves  and antiphospholipid syndromes which should use 2.5 - 3.5.             IVC diameter 1.29               IVRT [id number]               IVS 1.65               Ketones, UA         Negative       LA WIDTH 2.49               Left Atrium Major Axis 4.32               Left Atrium Minor Axis 2.52               LA size 2.83               LA volume 19.07               LA Volume Index 9.5               LVOT area 2.7               LDL Cholesterol External               Leukocytes, UA         Negative       LV LATERAL E/E' RATIO 8.30               LV SEPTAL E/E' RATIO 8.30               LV Diastolic Volume 61.89               LV Diastolic Volume Index 30.95               LVIDd 3.80               LVIDs 2.77                LV mass 259.06               LV Mass Index 130               Left Ventricular Outflow Tract Mean Gradient 4.47               Left Ventricular Outflow Tract Mean Velocity 0.62541712520863               LVOT diameter 1.85               LVOT peak chato 1.42               LVOT stroke volume 65.02               LVOT peak VTI 24.20               LV Systolic Volume 28.87               LV Systolic Volume Index 14.4               Lymph #     2.0           Lymph %     22.1           Magnesium     2.0           MCH     30.0           MCHC     31.8           MCV     95           Mean e' 0.10               Mono #     0.9           Mono %     10.4           MPV     10.8           MV valve area p 1/2 method 3.87               MV Peak A Chato 1.18               MV Peak E Chato 0.83               MV stenosis pressure 1/2 time 56.943562880519               NITRITE UA         Negative       Non-HDL Cholesterol               nRBC     0           Occult Blood UA         Negative       pH, UA         6.0       Phosphorus     2.6           Platelets     298           POCT Glucose   109     109         Potassium     6.0           PROTEIN TOTAL     6.7           Protein, UA         Negative  Comment: Recommend a 24 hour urine protein or a urine   protein/creatinine ratio if globulin induced proteinuria is  clinically suspected.         Protime     9.8           PV mean gradient 1.52               Posterior Wall 1.70               Right Atrial Pressure (from IVC) 3               RA Major Axis 2.98               RA Width 2.05               RBC     4.03           RDW     12.9           RVOT peak chato 0.83               RVOT peak VTI 15.4               Sinus 3.35               Sodium     142           Specific Villalba, UA         1.025       Specimen UA         Urine, Clean Catch       STJ 3.25               TAPSE 2.74               TDI SEPTAL 0.10               TDI LATERAL 0.10               Total Cholesterol/HDL Ratio                Triglycerides               Troponin I     0.009  Comment: The reference interval for Troponin I represents the 99th percentile   cutoff   for our facility and is consistent with 3rd generation assay   performance.             TSH               UROBILINOGEN UA         Negative       WBC     8.86                            01/05/22  1648        Albumin       Alkaline Phosphatase       ALT       Anion Gap       Ao root annulus       Ascending aorta       Ao peak harinder       Ao VTI       Appearance, UA       aPTT       AST       AV valve area       AV mean gradient       AV index (prosthetic)       AV peak gradient       AV Velocity Ratio       Baso #       Basophil %       Bilirubin (UA)       BILIRUBIN TOTAL       BSA       BUN       Calcium       Chloride       Cholesterol 151  Comment: The National Cholesterol Education Program (NCEP) has set the  following guidelines (reference ranges) for Cholesterol:  Optimal.....................<200 mg/dL  Borderline High.............200-239 mg/dL  High........................> or = 240 mg/dL         CO2       Color, UA       Creatinine       Left Ventricle Relative Wall Thickness       Differential Method       E/A ratio       Echo EF Estimated       E/E' ratio       eGFR if        eGFR if non        EF       Eos #       Eosinophil %       Estimated Avg Glucose 128       E wave deceleration time       FS       Glucose       Glucose, UA       Gran # (ANC)       Gran %       HDL 26  Comment: The National Cholesterol Education Program (NCEP) has set the  following guidelines (reference values) for HDL Cholesterol:  Low...............<40 mg/dL  Optimal...........>60 mg/dL         HDL/Cholesterol Ratio 17.2       Hematocrit       Hemoglobin       Hemoglobin A1C External 6.1  Comment: ADA Screening Guidelines:  5.7-6.4%  Consistent with prediabetes  >or=6.5%  Consistent with diabetes    High levels of fetal hemoglobin interfere with the HbA1C  assay.  Heterozygous hemoglobin variants (HbS, HgC, etc)do  not significantly interfere with this assay.   However, presence of multiple variants may affect accuracy.         Immature Grans (Abs)       Immature Granulocytes       INR       IVC diameter       IVRT       IVS       Ketones, UA       LA WIDTH       Left Atrium Major Axis       Left Atrium Minor Axis       LA size       LA volume       LA Volume Index       LVOT area       LDL Cholesterol External 90.6  Comment: The National Cholesterol Education Program (NCEP) has set the  following guidelines (reference values) for LDL Cholesterol:  Optimal.......................<130 mg/dL  Borderline High...............130-159 mg/dL  High..........................160-189 mg/dL  Very High.....................>190 mg/dL         Leukocytes, UA       LV LATERAL E/E' RATIO       LV SEPTAL E/E' RATIO       LV Diastolic Volume       LV Diastolic Volume Index       LVIDd       LVIDs       LV mass       LV Mass Index       Left Ventricular Outflow Tract Mean Gradient       Left Ventricular Outflow Tract Mean Velocity       LVOT diameter       LVOT peak chato       LVOT stroke volume       LVOT peak VTI       LV Systolic Volume       LV Systolic Volume Index       Lymph #       Lymph %       Magnesium       MCH       MCHC       MCV       Mean e'       Mono #       Mono %       MPV       MV valve area p 1/2 method       MV Peak A Chato       MV Peak E Chato       MV stenosis pressure 1/2 time       NITRITE UA       Non-HDL Cholesterol 125  Comment: Risk category and Non-HDL cholesterol goals:  Coronary heart disease (CHD)or equivalent (10-year risk of CHD >20%):  Non-HDL cholesterol goal     <130 mg/dL  Two or more CHD risk factors and 10-year risk of CHD <= 20%:  Non-HDL cholesterol goal     <160 mg/dL  0 to 1 CHD risk factor:  Non-HDL cholesterol goal     <190 mg/dL         nRBC       Occult Blood UA       pH, UA       Phosphorus       Platelets       POCT Glucose       Potassium        PROTEIN TOTAL       Protein, UA       Protime       PV mean gradient       Posterior Wall       Right Atrial Pressure (from IVC)       RA Major Axis       RA Width       RBC       RDW       RVOT peak harinder       RVOT peak VTI       Sinus       Sodium       Specific Gravity, UA       Specimen UA       STJ       TAPSE       TDI SEPTAL       TDI LATERAL       Total Cholesterol/HDL Ratio 5.8       Triglycerides 172  Comment: The National Cholesterol Education Program (NCEP) has set the  following guidelines (reference values) for triglycerides:  Normal......................<150 mg/dL  Borderline High.............150-199 mg/dL  High........................200-499 mg/dL         Troponin I       TSH 0.603       UROBILINOGEN UA       WBC             Significant Imaging: Echocardiogram:   Transthoracic echo (TTE) complete ([facility]upid Only):   Results for orders placed or performed during the hospital encounter of 01/05/22   Echo Saline Bubble? Yes   Result Value Ref Range    BSA 2.06 m2    TDI SEPTAL 0.10 m/s    LV LATERAL E/E' RATIO 8.30 m/s    LV SEPTAL E/E' RATIO 8.30 m/s    LA WIDTH 2.49 cm    IVC diameter 1.29 cm    Left Ventricular Outflow Tract Mean Velocity 0.15811137870999 cm/s    Left Ventricular Outflow Tract Mean Gradient 4.47 mmHg    TDI LATERAL 0.10 m/s    LVIDd 3.80 3.5 - 6.0 cm    IVS 1.65 (A) 0.6 - 1.1 cm    Posterior Wall 1.70 (A) 0.6 - 1.1 cm    Ao root annulus 3.18 cm    LVIDs 2.77 2.1 - 4.0 cm    FS 27 28 - 44 %    LA volume 19.07 cm3    Sinus 3.35 cm    STJ 3.25 cm    Ascending aorta 3.26 cm    LV mass 259.06 g    LA size 2.83 cm    TAPSE 2.74 cm    Left Ventricle Relative Wall Thickness 0.89 cm    AV mean gradient 12 mmHg    AV valve area 1.56 cm2    AV Velocity Ratio 0.57     AV index (prosthetic) 0.58     MV valve area p 1/2 method 3.87 cm2    E/A ratio 0.70     Mean e' 0.10 m/s    E wave deceleration time 196.77087364809317 msec    IVRT 77.912119643593448 msec    LVOT diameter 1.85 cm    LVOT area 2.7 cm2     LVOT peak chato 1.42 m/s    LVOT peak VTI 24.20 cm    Ao peak chato 2.47 m/s    Ao VTI 41.6 cm    RVOT peak chato 0.83 m/s    RVOT peak VTI 15.4 cm    LVOT stroke volume 65.02 cm3    AV peak gradient 24 mmHg    PV mean gradient 1.52 mmHg    E/E' ratio 8.30 m/s    MV Peak E Chato 0.83 m/s    MV stenosis pressure 1/2 time 56.768202873563 ms    MV Peak A Chato 1.18 m/s    LV Systolic Volume 28.87 mL    LV Systolic Volume Index 14.4 mL/m2    LV Diastolic Volume 61.89 mL    LV Diastolic Volume Index 30.95 mL/m2    LA Volume Index 9.5 mL/m2    LV Mass Index 130 g/m2    Echo EF Estimated 53 %    RA Major Axis 2.98 cm    Left Atrium Minor Axis 2.52 cm    Left Atrium Major Axis 4.32 cm    RA Width 2.05 cm    Right Atrial Pressure (from IVC) 3 mmHg    EF 60 %    Narrative    · The left ventricle is normal in size with concentric hypertrophy and   normal systolic function.  · The estimated ejection fraction is 60%.  · Normal left ventricular diastolic function.  · Normal right ventricular size with normal right ventricular systolic   function.  · There is mild aortic valve stenosis.  · Aortic valve area is 1.56 cm2; peak velocity is 2.47 m/s; mean gradient   is 12 mmHg.  · Normal central venous pressure (3 mmHg).        Assessment and Plan:     * Acute CVA (cerebrovascular accident)  Cont asa, plavix, statin  If high suspicion of embolic stroke recommend anticoagulation with Eliquis or coumadin  Would defer JAVAN and/or Loop recorder until patient is COVID free post hospitalization   2D ECHO without any obvious veg/thrombus on valves  Monitor here on telemetry for AFib/flutter      SANTA (acute kidney injury)  Cont to monitor renal function, improving     Prediabetes  Cont tx per primary team     Mixed hyperlipidemia  Cont statin        VTE Risk Mitigation (From admission, onward)         Ordered     heparin (porcine) injection 5,000 Units  Every 8 hours         01/05/22 1630     IP VTE HIGH RISK PATIENT  Once         01/05/22 1630      Place sequential compression device  Until discontinued         01/05/22 4348                Thank you for your consult. I will follow-up with patient. Please contact us if you have any additional questions.    Rui Doherty Md, MD  Cardiology   [facility] - Telemetry ([facility])

## 2022-01-06 NOTE — PT/OT/SLP EVAL
"Physical Therapy Evaluation and Discharge Note    Patient Name:  Javier Barrera   MRN:  [id number]    Recommendations:     Discharge Recommendations:  home   Discharge Equipment Recommendations: none   Barriers to discharge: None    Assessment:     Javier Barrera is a 66 y.o. male admitted with a medical diagnosis of Acute CVA (cerebrovascular accident). .  At this time, patient is functioning at their prior level of function and does not require further acute PT services.     Recent Surgery: * No surgery found *      Plan:     During this hospitalization, patient does not require further acute PT services.  Please re-consult if situation changes.      Subjective     Chief Complaint: "I CAN SEE, I JUST CAN'T READ"  Patient/Family Comments/goals: RETURN HOME  Pain/Comfort:  · Pain Rating 1: 0/10    Patients cultural, spiritual, Spiritism conflicts given the current situation:      Living Environment:  PT HAS A SON THAT LIVES WITH HIM, 2 STORY HOUSE, BR ON 2ND FLOOR, NO STEPS TO ENTER HOME, AMB INDEP COMMUNITY DISTANCES, DRIVES AND WORKS, INDEP WITH ADL'S  Prior to admission, patients level of function was INDEP.  Equipment used at home: none.  DME owned (not currently used): none.  Upon discharge, patient will have assistance from SON.    Objective:     Communicated with NURSE FREIRE prior to session.  Patient found supine with telemetry,peripheral IV upon PT entry to room.    General Precautions: Standard, airborne,contact,droplet,vision impaired   Orthopedic Precautions:N/A   Braces: N/A   Respiratory Status: Room air    Exams:  · Cognitive Exam:  Patient is oriented to Person, Place, Time and Situation  · Postural Exam:  Patient presented with the following abnormalities:    · -       Rounded shoulders  · Sensation:    · -       Intact  · RLE ROM: WNL  · RLE Strength: WNL  · LLE ROM: WNL  · LLE Strength: WNL    Functional Mobility:  · Bed Mobility:  Rolling Left:  independence  · Rolling Right: " independence  · Scooting: independence  · Supine to Sit: independence  · Sit to Supine: independence  · Transfers:     · Sit to Stand:  independence with no AD  · Bed to Chair: independence with  no AD  using  Step Transfer  · Gait: PT AMB 70' IN ROOM NO AD INDEP, NO LOB OR SOB ON ROOM AIR  · Balance: GOOD    AM-PAC 6 CLICK MOBILITY  Total Score:21     Therapeutic Activities and Exercises:   PT EDUCATED IN ROLE OF P.T. , PT ENCOURAGED TO INCREASE TIME OOB IN CHAIR, PT EDUCATED IN BLE THEREX TO PERFORM WHILE SEATED IN CHAIR: HIP FLEX/EXT, LAQ, AP'S    AM-PAC 6 CLICK MOBILITY  Total Score:21     Patient left up in chair with all lines intact, call button in reach and NURSE notified.    GOALS:   Multidisciplinary Problems     Physical Therapy Goals     Not on file                History:     No past medical history on file.    No past surgical history on file.    Time Tracking:     PT Received On: 01/06/22  PT Start Time: 0925     PT Stop Time: 0948  PT Total Time (min): 23 min     Billable Minutes: Evaluation 15 and Therapeutic Activity 8    01/06/2022

## 2022-01-06 NOTE — PT/OT/SLP EVAL
"Occupational Therapy   Evaluation and Discharge Note    Name: Javier Barrera  MRN: [id number]  Admitting Diagnosis:  Acute CVA (cerebrovascular accident)   Recent Surgery: * No surgery found *      Recommendations:     Discharge Recommendations: outpatient OT (for visual deficits)  Discharge Equipment Recommendations:  none  Barriers to discharge:  None    Assessment:     Javier Barrera is a 66 y.o. male with a medical diagnosis of Acute CVA (cerebrovascular accident). At this time, patient is functioning at their prior level of function and does not require further acute OT services.     Plan:     During this hospitalization, patient does not require further acute OT services.  Please re-consult if situation changes.    · Plan of Care Reviewed with: patient    Subjective     Chief Complaint: Reported "I just can't read."  Patient/Family Comments/goals: n/a    Occupational Profile:  Living Environment: Patient resides in a two story home, alone, with an upstairs bedroom.  Previous level of function: Patient was fully independent with ADLs and ambulation prior to admit. He was driving and working full time.  Roles and Routines: air conditioning tech  Equipment Used at home:  none  Assistance upon Discharge: none    Pain/Comfort:  · Pain Rating 1:  (voices chronic pain that is unchanged, did not rate)    Objective:     Communicated with: NurseTrell, prior to session.  Patient found standind at bedside with telemetry,peripheral IV upon OT entry to room.    General Precautions: Standard, airborne,droplet,contact,fall   Orthopedic Precautions:N/A   Braces: N/A  Respiratory Status: Room air     Functional Mobility/Transfers:  · Patient completed Sit <> Stand Transfer with independence  with  no assistive device   · Patient completed Bed <> Chair Transfer using Step Transfer technique with independence with no assistive device  · Functional Mobility: Patient completed x80ft functional mobility independently to " increase OOB activity. No LOB or SOB with completion.    Activities of Daily Living:  · Upper Body Dressing: independence .  · Lower Body Dressing: independence .  · Toileting: independence .    Cognitive/Visual Perceptual:  Cognitive/Psychosocial Skills:     -       Oriented to: Person, Place, Time and Situation   -       Follows Commands/attention:Follows multistep  commands    Physical Exam:  Balance:    -       sitting: WFL, standing: WFL  Upper Extremity Range of Motion:     -       Right Upper Extremity: WFL  -       Left Upper Extremity: WFL  Upper Extremity Strength:    -       Right Upper Extremity: WFL  -       Left Upper Extremity: WFL   Strength:    -       Right Upper Extremity: WFL  -       Left Upper Extremity: WFL  Fine Motor Coordination:    -       Intact    AMPAC 6 Click ADL:  AMPAC Total Score: 24    Patient with no strength or coordination deficits present. C/O visual field deficits that he describes as Left lower and Right upper field cuts impairing his ability to read, no effects on balance at OT assessment this date.   Education:    Patient left seated EOB with all lines intact and call button in reach    History:     No past medical history on file.    No past surgical history on file.    Time Tracking:     OT Date of Treatment: 01/06/22  OT Start Time: 1000  OT Stop Time: 1015  OT Total Time (min): 15 min    Billable Minutes:Evaluation 15    1/6/2022

## 2022-01-06 NOTE — CONSULTS
[facility] - Emergency Dept.  Neurology  Consult Note    Patient Name: Javier Barrera  MRN: [id number]  Admission Date: 1/5/2022  Hospital Length of Stay: 0 days  Code Status: Full Code   Attending Provider: Robbin Rizzo Jr., MD   Consulting Provider: Chidi Ramos MD  Primary Care Physician: Primary Doctor No  Principal Problem:Acute CVA (cerebrovascular accident)    Inpatient consult to Neurology  Consult performed by: Chidi Ramos MD  Consult ordered by: Elvis Elizondo MD        Subjective:     Chief Complaint:  stroke     HPI:  Patient is a 66-year-old man with past medical history of hypertension, DM, cervical radiculopathy, carpal tunnel syndrome bilaterally, previous smoker, who was admitted with concerns of ischemic stroke.  Over the past 2-3 months, patient has been experiencing visual field loss, intermittent dizziness.  Patient has been seeing Neuro-Ophthalmology, was found to have ischemic optic neuropathy bilaterally from eye exam.  Further workup included MRI of the brain which revealed:  Multiple small areas of recent infarction (acute or early subacute) involving the left cerebellar hemisphere and right occipital lobe as above.  No significant mass effect or associated hemorrhage.     Consider further evaluation with dedicated vascular imaging (i.e.  CTA head and neck).     Modest chronic microvascular ischemic change and small remote left frontal cortical infarct.     Small defect in the frontal lobe (middle frontal gyrus) as above, possibly remote infarct versus developmental abnormality.       Patient reports he has been taking aspirin pretty regularly prior to admission.  Inflammatory markers from ophthalmology workup were negative for GCA.    Additional labs:  H&H 11.8/36.3, WBC 9.6  Sodium 140, BUN 62, creatinine 2.6    No past medical history on file.    No past surgical history on file.    Review of patient's allergies indicates:   Allergen Reactions    Penicillins Swelling     Atorvastatin Other (See Comments)       Current Neurological Medications:     Current Facility-Administered Medications on File Prior to Encounter   Medication    [COMPLETED] gadobutroL (GADAVIST) injection 10 mL     Current Outpatient Medications on File Prior to Encounter   Medication Sig    amLODIPine (NORVASC) 10 MG tablet Take 10 mg by mouth once daily.    furosemide (LASIX) 40 MG tablet Take 40 mg by mouth every morning.    gabapentin (NEURONTIN) 300 MG capsule Take 1 capsule (300 mg total) by mouth every evening.    hydroCHLOROthiazide (HYDRODIURIL) 25 MG tablet Take 25 mg by mouth once daily.    lisinopriL (PRINIVIL,ZESTRIL) 20 MG tablet Take 20 mg by mouth once daily.    metFORMIN (GLUCOPHAGE) 500 MG tablet Take 500 mg by mouth 2 (two) times daily with meals.    potassium chloride SA (K-DUR,KLOR-CON) 20 MEQ tablet Take 20 mEq by mouth once daily.    [DISCONTINUED] amLODIPine (NORVASC) 5 MG tablet Take 10 mg by mouth.    [DISCONTINUED] compress.stocking,knee,reg,med Misc 2 each by Misc.(Non-Drug; Combo Route) route Daily. Wear during the day.    [DISCONTINUED] cyclobenzaprine (FLEXERIL) 5 MG tablet Take 1 tablet (5 mg total) by mouth 3 (three) times daily as needed for Muscle spasms.    [DISCONTINUED] diclofenac (VOLTAREN) 50 MG EC tablet Take 1 tablet (50 mg total) by mouth 3 (three) times daily as needed.    [DISCONTINUED] naproxen (NAPROSYN) 500 MG tablet Take 500 mg by mouth.    [DISCONTINUED] potassium chloride (KLOR-CON) 20 mEq Pack Take 20 mEq by mouth.    [DISCONTINUED] predniSONE (DELTASONE) 10 MG tablet Take 1 tablet (10 mg total) by mouth once daily. Take 4 tabs x 3 days, then  Take 2 tabs x 3 days, then   Take 1 tab x 3 days.    [DISCONTINUED] tiZANidine (ZANAFLEX) 4 MG tablet tizanidine 4 mg tablet   TAKE 1 TABLET BY MOUTH EVERY 6 HOURS AS NEEDED    [DISCONTINUED] traMADoL (ULTRAM) 50 mg tablet tramadol 50 mg tablet   TAKE 1 TABLET BY MOUTH EVERY 6 HOURS AS NEEDED      Family  History    None       Tobacco Use    Smoking status: Unknown If Ever Smoked    Smokeless tobacco: Not on file   Substance and Sexual Activity    Alcohol use: Not Currently    Drug use: Never    Sexual activity: Not Currently     Review of Systems   Constitutional: Negative.    HENT: Negative.    Eyes: Negative.    Respiratory: Negative.    Cardiovascular: Negative.    Gastrointestinal: Negative.    Endocrine: Negative.    Genitourinary: Negative.    Musculoskeletal: Negative.    Skin: Negative.    Allergic/Immunologic: Negative.    Neurological: Positive for dizziness and numbness.   Hematological: Negative.    Psychiatric/Behavioral: Negative.      Objective:     Vital Signs (Most Recent):  Temp: 97.9 °F (36.6 °C) (01/05/22 1114)  Pulse: 97 (01/05/22 1601)  Resp: 16 (01/05/22 1114)  BP: 134/62 (01/05/22 1601)  SpO2: 97 % (01/05/22 1601) Vital Signs (24h Range):  Temp:  [97.9 °F (36.6 °C)] 97.9 °F (36.6 °C)  Pulse:  [83-97] 97  Resp:  [16] 16  SpO2:  [96 %-99 %] 97 %  BP: ()/() 134/62     Weight: 95.2 kg (209 lb 14.1 oz)  Body mass index is 33.88 kg/m².    Physical Exam  HENT:      Head: Normocephalic.      Nose: Nose normal.      Mouth/Throat:      Mouth: Mucous membranes are moist.   Eyes:      Extraocular Movements: Extraocular movements intact and EOM normal.      Pupils: Pupils are equal, round, and reactive to light.   Cardiovascular:      Rate and Rhythm: Normal rate and regular rhythm.      Pulses: Normal pulses.      Heart sounds: Normal heart sounds.   Pulmonary:      Effort: Pulmonary effort is normal.      Breath sounds: Normal breath sounds.   Abdominal:      General: Abdomen is flat. Bowel sounds are normal.   Musculoskeletal:         General: Normal range of motion.   Skin:     General: Skin is warm.   Neurological:      Mental Status: He is alert and oriented to person, place, and time.      Coordination: Finger-Nose-Finger Test normal.      Deep Tendon Reflexes: Strength normal.       Reflex Scores:       Tricep reflexes are 1+ on the right side and 1+ on the left side.       Bicep reflexes are 1+ on the right side and 1+ on the left side.       Brachioradialis reflexes are 1+ on the right side and 1+ on the left side.       Patellar reflexes are 1+ on the right side and 1+ on the left side.       Achilles reflexes are 1+ on the right side and 1+ on the left side.  Psychiatric:         Mood and Affect: Mood normal.         NEUROLOGICAL EXAMINATION:     MENTAL STATUS   Oriented to person, place, and time.   Normal comprehension.     CRANIAL NERVES     CN II   Visual acuity: normal  Left visual field deficit: lower temporal and upper temporal quadrant(s)    CN III, IV, VI   Pupils are equal, round, and reactive to light.  Extraocular motions are normal.     CN V   Facial sensation intact.     CN VII   Facial expression full, symmetric.     CN VIII   CN VIII normal.     CN IX, X   CN IX normal.     CN XI   CN XI normal.     CN XII   CN XII normal.     MOTOR EXAM   Overall muscle tone: normal    Strength   Strength 5/5 throughout.     REFLEXES     Reflexes   Right brachioradialis: 1+  Left brachioradialis: 1+  Right biceps: 1+  Left biceps: 1+  Right triceps: 1+  Left triceps: 1+  Right patellar: 1+  Left patellar: 1+  Right achilles: 1+  Left achilles: 1+  Right : 1+  Left : 1+  Right plantar: normal  Left plantar: normal    SENSORY EXAM   Light touch normal.     GAIT AND COORDINATION      Coordination   Finger to nose coordination: normal       Gait exam deferred due to patient's condition       Significant Labs:   BMP:   Recent Labs   Lab 01/05/22  1216   GLU 83      K 5.3*      CO2 27   BUN 62*   CREATININE 2.6*   CALCIUM 9.2     CBC:   Recent Labs   Lab 01/05/22  1216   WBC 9.63   HGB 11.8*   HCT 36.3*          Significant Imaging: I have reviewed all pertinent imaging results/findings within the past 24 hours.    Assessment and Plan:     Reviewed MRI of the brain,  patient has subacute infarcts in the right cerebellum and left occipital region, fits more with bilateral posterior circulation vascular distribution.  Also has a chronic infarct in the left MCA distribution.  Recent inflammatory markers were negative, therefore doubt vasculitis but agree with obtaining MRA of the head, rule out ICAD.   Can obtain dedicated vascular neck imaging was kidney function improves, recommend carotid Doppler ultrasound for now.  Numbness symptoms reported by patient today is more related to his entrapment neuropathy.    Plan:  -Start ASA and Plavix for now  -Start Atorvastatin 80 mg daily   -Check lipid profile, HbA1c  -MRA of head  -Carotid doppler U/S  -TTE  -Neurochecks q 4 hours  -PT/ST/OT  -DVT prophylaxis  -May need JAVAN etiology of stroke unclear          Active Diagnoses:    Diagnosis Date Noted POA    PRINCIPAL PROBLEM:  Acute CVA (cerebrovascular accident) [I63.9] 01/05/2022 Unknown    Compulsive scratching behavior [R46.89] 01/05/2022 Unknown    Tinea cruris [B35.6] 01/05/2022 Unknown    SANTA (acute kidney injury) [N17.9] 01/05/2022 Unknown    Mixed hyperlipidemia [E78.2] 12/11/2020 Yes    Prediabetes [R73.03] 12/11/2020 Yes      Problems Resolved During this Admission:       VTE Risk Mitigation (From admission, onward)         Ordered     heparin (porcine) injection 5,000 Units  Every 8 hours         01/05/22 1630     IP VTE HIGH RISK PATIENT  Once         01/05/22 1630     Place sequential compression device  Until discontinued         01/05/22 1630                Thank you for your consult. I will follow-up with patient. Please contact us if you have any additional questions.    Chidi Ramos MD  Neurology  [facility] - Emergency Dept.

## 2022-01-07 LAB
ANA SER QL IF: NORMAL
ANION GAP SERPL CALC-SCNC: 8 MMOL/L (ref 8–16)
BUN SERPL-MCNC: 30 MG/DL (ref 8–23)
CALCIUM SERPL-MCNC: 9.1 MG/DL (ref 8.7–10.5)
CHLORIDE SERPL-SCNC: 105 MMOL/L (ref 95–110)
CO2 SERPL-SCNC: 23 MMOL/L (ref 23–29)
CREAT SERPL-MCNC: 1.3 MG/DL (ref 0.5–1.4)
EST. GFR  (AFRICAN AMERICAN): >60 ML/MIN/1.73 M^2
EST. GFR  (NON AFRICAN AMERICAN): 56 ML/MIN/1.73 M^2
FOLATE SERPL-MCNC: 13.9 NG/ML (ref 4–24)
GLUCOSE SERPL-MCNC: 123 MG/DL (ref 70–110)
IRON SERPL-MCNC: 39 UG/DL (ref 45–160)
MAGNESIUM SERPL-MCNC: 1.8 MG/DL (ref 1.6–2.6)
POCT GLUCOSE: 115 MG/DL (ref 70–110)
POCT GLUCOSE: 120 MG/DL (ref 70–110)
POCT GLUCOSE: 120 MG/DL (ref 70–110)
POTASSIUM SERPL-SCNC: 5.2 MMOL/L (ref 3.5–5.1)
SATURATED IRON: 10 % (ref 20–50)
SODIUM SERPL-SCNC: 136 MMOL/L (ref 136–145)
TOTAL IRON BINDING CAPACITY: 377 UG/DL (ref 250–450)
TRANSFERRIN SERPL-MCNC: 255 MG/DL (ref 200–375)
VIT B12 SERPL-MCNC: 401 PG/ML (ref 210–950)

## 2022-01-07 PROCEDURE — 99233 SBSQ HOSP IP/OBS HIGH 50: CPT | Mod: ,,, | Performed by: INTERNAL MEDICINE

## 2022-01-07 PROCEDURE — 99233 PR SUBSEQUENT HOSPITAL CARE,LEVL III: ICD-10-PCS | Mod: ,,, | Performed by: INTERNAL MEDICINE

## 2022-01-07 PROCEDURE — 25000003 PHARM REV CODE 250: Performed by: FAMILY MEDICINE

## 2022-01-07 PROCEDURE — 21400001 HC TELEMETRY ROOM

## 2022-01-07 PROCEDURE — 63600175 PHARM REV CODE 636 W HCPCS: Performed by: FAMILY MEDICINE

## 2022-01-07 PROCEDURE — 25000003 PHARM REV CODE 250: Performed by: INTERNAL MEDICINE

## 2022-01-07 PROCEDURE — 80048 BASIC METABOLIC PNL TOTAL CA: CPT | Performed by: INTERNAL MEDICINE

## 2022-01-07 PROCEDURE — 63700000 PHARM REV CODE 250 ALT 637 W/O HCPCS: Performed by: INTERNAL MEDICINE

## 2022-01-07 PROCEDURE — 86255 FLUORESCENT ANTIBODY SCREEN: CPT | Mod: 59 | Performed by: INTERNAL MEDICINE

## 2022-01-07 PROCEDURE — 25500020 PHARM REV CODE 255: Performed by: INTERNAL MEDICINE

## 2022-01-07 PROCEDURE — 27000207 HC ISOLATION

## 2022-01-07 PROCEDURE — 94761 N-INVAS EAR/PLS OXIMETRY MLT: CPT

## 2022-01-07 PROCEDURE — 36415 COLL VENOUS BLD VENIPUNCTURE: CPT | Performed by: INTERNAL MEDICINE

## 2022-01-07 PROCEDURE — 83735 ASSAY OF MAGNESIUM: CPT | Performed by: INTERNAL MEDICINE

## 2022-01-07 RX ORDER — EZETIMIBE 10 MG/1
10 TABLET ORAL NIGHTLY
Status: DISCONTINUED | OUTPATIENT
Start: 2022-01-07 | End: 2022-01-08 | Stop reason: HOSPADM

## 2022-01-07 RX ORDER — SODIUM CHLORIDE 9 MG/ML
INJECTION, SOLUTION INTRAVENOUS CONTINUOUS
Status: ACTIVE | OUTPATIENT
Start: 2022-01-07 | End: 2022-01-08

## 2022-01-07 RX ADMIN — GABAPENTIN 300 MG: 300 CAPSULE ORAL at 09:01

## 2022-01-07 RX ADMIN — GABAPENTIN 300 MG: 300 CAPSULE ORAL at 08:01

## 2022-01-07 RX ADMIN — HEPARIN SODIUM 5000 UNITS: 5000 INJECTION INTRAVENOUS; SUBCUTANEOUS at 05:01

## 2022-01-07 RX ADMIN — CLOPIDOGREL 75 MG: 75 TABLET, FILM COATED ORAL at 09:01

## 2022-01-07 RX ADMIN — HYDROCODONE BITARTRATE AND ACETAMINOPHEN 1 TABLET: 5; 325 TABLET ORAL at 02:01

## 2022-01-07 RX ADMIN — AMLODIPINE BESYLATE 10 MG: 10 TABLET ORAL at 09:01

## 2022-01-07 RX ADMIN — HEPARIN SODIUM 5000 UNITS: 5000 INJECTION INTRAVENOUS; SUBCUTANEOUS at 10:01

## 2022-01-07 RX ADMIN — ASPIRIN 81 MG: 81 TABLET, COATED ORAL at 09:01

## 2022-01-07 RX ADMIN — HYDROCODONE BITARTRATE AND ACETAMINOPHEN 1 TABLET: 5; 325 TABLET ORAL at 03:01

## 2022-01-07 RX ADMIN — Medication 1000 MG: at 08:01

## 2022-01-07 RX ADMIN — FLUCONAZOLE 200 MG: 100 TABLET ORAL at 09:01

## 2022-01-07 RX ADMIN — NYSTATIN AND TRIAMCINOLONE ACETONIDE: 100000; 1 OINTMENT TOPICAL at 08:01

## 2022-01-07 RX ADMIN — OXYCODONE HYDROCHLORIDE AND ACETAMINOPHEN 500 MG: 500 TABLET ORAL at 09:01

## 2022-01-07 RX ADMIN — Medication 5000 UNITS: at 09:01

## 2022-01-07 RX ADMIN — HEPARIN SODIUM 5000 UNITS: 5000 INJECTION INTRAVENOUS; SUBCUTANEOUS at 02:01

## 2022-01-07 RX ADMIN — NYSTATIN AND TRIAMCINOLONE ACETONIDE: 100000; 1 OINTMENT TOPICAL at 09:01

## 2022-01-07 RX ADMIN — SODIUM ZIRCONIUM CYCLOSILICATE 5 G: 5 POWDER, FOR SUSPENSION ORAL at 11:01

## 2022-01-07 RX ADMIN — IOHEXOL 85 ML: 350 INJECTION, SOLUTION INTRAVENOUS at 09:01

## 2022-01-07 RX ADMIN — EZETIMIBE 10 MG: 10 TABLET ORAL at 08:01

## 2022-01-07 RX ADMIN — HYDROCODONE BITARTRATE AND ACETAMINOPHEN 1 TABLET: 5; 325 TABLET ORAL at 08:01

## 2022-01-07 RX ADMIN — SODIUM CHLORIDE: 0.9 INJECTION, SOLUTION INTRAVENOUS at 08:01

## 2022-01-07 NOTE — ASSESSMENT & PLAN NOTE
Cont asa, plavix; statin intolerant; added Zetia   If high suspicion of embolic stroke recommend anticoagulation with Eliquis or coumadin  Would defer JAVAN and/or Loop recorder until patient is COVID free post hospitalization   2D ECHO without any obvious veg/thrombus on valves  Monitor here on telemetry for AFib/flutter

## 2022-01-07 NOTE — SUBJECTIVE & OBJECTIVE
No past medical history on file.    No past surgical history on file.    Review of patient's allergies indicates:   Allergen Reactions    Penicillins Swelling    Atorvastatin Other (See Comments)       No current facility-administered medications on file prior to encounter.     Current Outpatient Medications on File Prior to Encounter   Medication Sig    amLODIPine (NORVASC) 10 MG tablet Take 10 mg by mouth once daily.    furosemide (LASIX) 40 MG tablet Take 40 mg by mouth every morning.    gabapentin (NEURONTIN) 300 MG capsule Take 1 capsule (300 mg total) by mouth every evening.    hydroCHLOROthiazide (HYDRODIURIL) 25 MG tablet Take 25 mg by mouth once daily.    lisinopriL (PRINIVIL,ZESTRIL) 20 MG tablet Take 20 mg by mouth once daily.    metFORMIN (GLUCOPHAGE) 500 MG tablet Take 500 mg by mouth 2 (two) times daily with meals.    potassium chloride SA (K-DUR,KLOR-CON) 20 MEQ tablet Take 20 mEq by mouth once daily.     Family History    None       Tobacco Use    Smoking status: Unknown If Ever Smoked    Smokeless tobacco: Not on file   Substance and Sexual Activity    Alcohol use: Not Currently    Drug use: Never    Sexual activity: Not Currently     Review of Systems   Unable to perform ROS: acuity of condition     Objective:     Vital Signs (Most Recent):  Temp: 98.6 °F (37 °C) (01/07/22 1548)  Pulse: 76 (01/07/22 1548)  Resp: 18 (01/07/22 1548)  BP: 114/69 (01/07/22 1548)  SpO2: (!) 94 % (01/07/22 1548) Vital Signs (24h Range):  Temp:  [98 °F (36.7 °C)-98.9 °F (37.2 °C)] 98.6 °F (37 °C)  Pulse:  [] 76  Resp:  [18-20] 18  SpO2:  [94 %-99 %] 94 %  BP: (102-135)/(50-69) 114/69     Weight: 91.2 kg (201 lb)  Body mass index is 32.44 kg/m².    SpO2: (!) 94 %  O2 Device (Oxygen Therapy): room air      Intake/Output Summary (Last 24 hours) at 1/7/2022 1658  Last data filed at 1/7/2022 0600  Gross per 24 hour   Intake 1500 ml   Output 400 ml   Net 1100 ml       Lines/Drains/Airways     Peripheral  Intravenous Line                 Peripheral IV - Single Lumen 01/05/22 1220 18 G Left Antecubital 2 days                Physical Exam  Vitals and nursing note reviewed.   Constitutional:       Comments: Physical exam not completed due to COVID         Significant Labs:   All pertinent lab results from the last 24 hours have been reviewed. and   Recent Lab Results       01/07/22  1154   01/07/22  0553   01/07/22  0443   01/06/22  2122        Anion Gap     8         BUN     30         Calcium     9.1         Chloride     105         CO2     23         Creatinine     1.3         eGFR if      >60         eGFR if non      56  Comment: Calculation used to obtain the estimated glomerular filtration  rate (eGFR) is the CKD-EPI equation.            Glucose     123         Magnesium     1.8         POCT Glucose 115   120     113       Potassium     5.2         Sodium     136               Significant Imaging: Echocardiogram:   Transthoracic echo (TTE) complete (Cupid Only):   Results for orders placed or performed during the hospital encounter of 01/05/22   Echo Saline Bubble? Yes   Result Value Ref Range    BSA 2.06 m2    TDI SEPTAL 0.10 m/s    LV LATERAL E/E' RATIO 8.30 m/s    LV SEPTAL E/E' RATIO 8.30 m/s    LA WIDTH 2.49 cm    IVC diameter 1.29 cm    Left Ventricular Outflow Tract Mean Velocity 0.81739698332801 cm/s    Left Ventricular Outflow Tract Mean Gradient 4.47 mmHg    TDI LATERAL 0.10 m/s    LVIDd 3.80 3.5 - 6.0 cm    IVS 1.65 (A) 0.6 - 1.1 cm    Posterior Wall 1.70 (A) 0.6 - 1.1 cm    Ao root annulus 3.18 cm    LVIDs 2.77 2.1 - 4.0 cm    FS 27 28 - 44 %    LA volume 19.07 cm3    Sinus 3.35 cm    STJ 3.25 cm    Ascending aorta 3.26 cm    LV mass 259.06 g    LA size 2.83 cm    TAPSE 2.74 cm    Left Ventricle Relative Wall Thickness 0.89 cm    AV mean gradient 12 mmHg    AV valve area 1.56 cm2    AV Velocity Ratio 0.57     AV index (prosthetic) 0.58     MV valve area p 1/2 method 3.87  cm2    E/A ratio 0.70     Mean e' 0.10 m/s    E wave deceleration time 196.52477264207669 msec    IVRT 77.349501295649642 msec    LVOT diameter 1.85 cm    LVOT area 2.7 cm2    LVOT peak chato 1.42 m/s    LVOT peak VTI 24.20 cm    Ao peak chato 2.47 m/s    Ao VTI 41.6 cm    RVOT peak chato 0.83 m/s    RVOT peak VTI 15.4 cm    LVOT stroke volume 65.02 cm3    AV peak gradient 24 mmHg    PV mean gradient 1.52 mmHg    E/E' ratio 8.30 m/s    MV Peak E Chato 0.83 m/s    MV stenosis pressure 1/2 time 56.247844623078 ms    MV Peak A Chato 1.18 m/s    LV Systolic Volume 28.87 mL    LV Systolic Volume Index 14.4 mL/m2    LV Diastolic Volume 61.89 mL    LV Diastolic Volume Index 30.95 mL/m2    LA Volume Index 9.5 mL/m2    LV Mass Index 130 g/m2    Echo EF Estimated 53 %    RA Major Axis 2.98 cm    Left Atrium Minor Axis 2.52 cm    Left Atrium Major Axis 4.32 cm    RA Width 2.05 cm    Right Atrial Pressure (from IVC) 3 mmHg    EF 60 %    Narrative    · The left ventricle is normal in size with concentric hypertrophy and   normal systolic function.  · The estimated ejection fraction is 60%.  · Normal left ventricular diastolic function.  · Normal right ventricular size with normal right ventricular systolic   function.  · There is mild aortic valve stenosis.  · Aortic valve area is 1.56 cm2; peak velocity is 2.47 m/s; mean gradient   is 12 mmHg.  · Normal central venous pressure (3 mmHg).

## 2022-01-07 NOTE — HOSPITAL COURSE
Cardiology consulted for further evaluate of CVA etiology per neuro requesting JAVAN.     1/7/22- no acute events, statin intolerant, added Zetia; JAVAN can be performed outpt    Results for orders placed during the hospital encounter of 01/05/22    Echo Saline Bubble? Yes    Interpretation Summary  · The left ventricle is normal in size with concentric hypertrophy and normal systolic function.  · The estimated ejection fraction is 60%.  · Normal left ventricular diastolic function.  · Normal right ventricular size with normal right ventricular systolic function.  · There is mild aortic valve stenosis.  · Aortic valve area is 1.56 cm2; peak velocity is 2.47 m/s; mean gradient is 12 mmHg.  · Normal central venous pressure (3 mmHg).

## 2022-01-07 NOTE — PROGRESS NOTES
[facility] - Telemetry ([facility]  Cardiology  Progress Note    Patient Name: Javier Barrera  MRN: [id number]  Admission Date: 1/5/2022  Hospital Length of Stay: 1 days  Code Status: Full Code   Attending Physician: Henri De La Torre, *   Primary Care Physician: Primary Doctor No  Expected Discharge Date:   Principal Problem:Acute CVA (cerebrovascular accident)    Subjective:     Hospital Course:   Cardiology consulted for further evaluate of CVA etiology per neuro requesting JAVAN.     1/7/22- no acute events, statin intolerant, added Zetia; JAVAN can be performed outpt    Results for orders placed during the hospital encounter of 01/05/22    Echo Saline Bubble? Yes    Interpretation Summary  · The left ventricle is normal in size with concentric hypertrophy and normal systolic function.  · The estimated ejection fraction is 60%.  · Normal left ventricular diastolic function.  · Normal right ventricular size with normal right ventricular systolic function.  · There is mild aortic valve stenosis.  · Aortic valve area is 1.56 cm2; peak velocity is 2.47 m/s; mean gradient is 12 mmHg.  · Normal central venous pressure (3 mmHg).        No past medical history on file.    No past surgical history on file.    Review of patient's allergies indicates:   Allergen Reactions    Penicillins Swelling    Atorvastatin Other (See Comments)       No current facility-administered medications on file prior to encounter.     Current Outpatient Medications on File Prior to Encounter   Medication Sig    amLODIPine (NORVASC) 10 MG tablet Take 10 mg by mouth once daily.    furosemide (LASIX) 40 MG tablet Take 40 mg by mouth every morning.    gabapentin (NEURONTIN) 300 MG capsule Take 1 capsule (300 mg total) by mouth every evening.    hydroCHLOROthiazide (HYDRODIURIL) 25 MG tablet Take 25 mg by mouth once daily.    lisinopriL (PRINIVIL,ZESTRIL) 20 MG tablet Take 20 mg by mouth once daily.    metFORMIN (GLUCOPHAGE) 500 MG tablet Take  500 mg by mouth 2 (two) times daily with meals.    potassium chloride SA (K-DUR,KLOR-CON) 20 MEQ tablet Take 20 mEq by mouth once daily.     Family History    None       Tobacco Use    Smoking status: Unknown If Ever Smoked    Smokeless tobacco: Not on file   Substance and Sexual Activity    Alcohol use: Not Currently    Drug use: Never    Sexual activity: Not Currently     Review of Systems   Unable to perform ROS: acuity of condition     Objective:     Vital Signs (Most Recent):  Temp: 98.6 °F (37 °C) (01/07/22 1548)  Pulse: 76 (01/07/22 1548)  Resp: 18 (01/07/22 1548)  BP: 114/69 (01/07/22 1548)  SpO2: (!) 94 % (01/07/22 1548) Vital Signs (24h Range):  Temp:  [98 °F (36.7 °C)-98.9 °F (37.2 °C)] 98.6 °F (37 °C)  Pulse:  [] 76  Resp:  [18-20] 18  SpO2:  [94 %-99 %] 94 %  BP: (102-135)/(50-69) 114/69     Weight: 91.2 kg (201 lb)  Body mass index is 32.44 kg/m².    SpO2: (!) 94 %  O2 Device (Oxygen Therapy): room air      Intake/Output Summary (Last 24 hours) at 1/7/2022 1658  Last data filed at 1/7/2022 0600  Gross per 24 hour   Intake 1500 ml   Output 400 ml   Net 1100 ml       Lines/Drains/Airways     Peripheral Intravenous Line                 Peripheral IV - Single Lumen 01/05/22 1220 18 G Left Antecubital 2 days                Physical Exam  Vitals and nursing note reviewed.   Constitutional:       Comments: Physical exam not completed due to COVID         Significant Labs:   All pertinent lab results from the last 24 hours have been reviewed. and   Recent Lab Results       01/07/22  1154   01/07/22  0553   01/07/22  0443   01/06/22  2122        Anion Gap     8         BUN     30         Calcium     9.1         Chloride     105         CO2     23         Creatinine     1.3         eGFR if      >60         eGFR if non      56  Comment: Calculation used to obtain the estimated glomerular filtration  rate (eGFR) is the CKD-EPI equation.            Glucose     123          Magnesium     1.8         POCT Glucose 115   120     113       Potassium     5.2         Sodium     136               Significant Imaging: Echocardiogram:   Transthoracic echo (TTE) complete (Cupid Only):   Results for orders placed or performed during the hospital encounter of 01/05/22   Echo Saline Bubble? Yes   Result Value Ref Range    BSA 2.06 m2    TDI SEPTAL 0.10 m/s    LV LATERAL E/E' RATIO 8.30 m/s    LV SEPTAL E/E' RATIO 8.30 m/s    LA WIDTH 2.49 cm    IVC diameter 1.29 cm    Left Ventricular Outflow Tract Mean Velocity 0.17665974712520 cm/s    Left Ventricular Outflow Tract Mean Gradient 4.47 mmHg    TDI LATERAL 0.10 m/s    LVIDd 3.80 3.5 - 6.0 cm    IVS 1.65 (A) 0.6 - 1.1 cm    Posterior Wall 1.70 (A) 0.6 - 1.1 cm    Ao root annulus 3.18 cm    LVIDs 2.77 2.1 - 4.0 cm    FS 27 28 - 44 %    LA volume 19.07 cm3    Sinus 3.35 cm    STJ 3.25 cm    Ascending aorta 3.26 cm    LV mass 259.06 g    LA size 2.83 cm    TAPSE 2.74 cm    Left Ventricle Relative Wall Thickness 0.89 cm    AV mean gradient 12 mmHg    AV valve area 1.56 cm2    AV Velocity Ratio 0.57     AV index (prosthetic) 0.58     MV valve area p 1/2 method 3.87 cm2    E/A ratio 0.70     Mean e' 0.10 m/s    E wave deceleration time 196.87268000933243 msec    IVRT 77.739161374728425 msec    LVOT diameter 1.85 cm    LVOT area 2.7 cm2    LVOT peak chato 1.42 m/s    LVOT peak VTI 24.20 cm    Ao peak chato 2.47 m/s    Ao VTI 41.6 cm    RVOT peak chato 0.83 m/s    RVOT peak VTI 15.4 cm    LVOT stroke volume 65.02 cm3    AV peak gradient 24 mmHg    PV mean gradient 1.52 mmHg    E/E' ratio 8.30 m/s    MV Peak E Chato 0.83 m/s    MV stenosis pressure 1/2 time 56.456147995605 ms    MV Peak A Chato 1.18 m/s    LV Systolic Volume 28.87 mL    LV Systolic Volume Index 14.4 mL/m2    LV Diastolic Volume 61.89 mL    LV Diastolic Volume Index 30.95 mL/m2    LA Volume Index 9.5 mL/m2    LV Mass Index 130 g/m2    Echo EF Estimated 53 %    RA Major Axis 2.98 cm    Left Atrium Minor  Axis 2.52 cm    Left Atrium Major Axis 4.32 cm    RA Width 2.05 cm    Right Atrial Pressure (from IVC) 3 mmHg    EF 60 %    Narrative    · The left ventricle is normal in size with concentric hypertrophy and   normal systolic function.  · The estimated ejection fraction is 60%.  · Normal left ventricular diastolic function.  · Normal right ventricular size with normal right ventricular systolic   function.  · There is mild aortic valve stenosis.  · Aortic valve area is 1.56 cm2; peak velocity is 2.47 m/s; mean gradient   is 12 mmHg.  · Normal central venous pressure (3 mmHg).        Assessment and Plan:     Brief HPI: no acute events, can f/u outpt in CV clinic    * Acute CVA (cerebrovascular accident)  Cont asa, plavix; statin intolerant; added Zetia   If high suspicion of embolic stroke recommend anticoagulation with Eliquis or coumadin  Would defer JAVAN and/or Loop recorder until patient is COVID free post hospitalization   2D ECHO without any obvious veg/thrombus on valves  Monitor here on telemetry for AFib/flutter      SANTA (acute kidney injury)  Cont to monitor renal function, improving     Prediabetes  Cont tx per primary team     Mixed hyperlipidemia  Cont statin      Call with changes in CV status    VTE Risk Mitigation (From admission, onward)         Ordered     heparin (porcine) injection 5,000 Units  Every 8 hours         01/05/22 1630     IP VTE HIGH RISK PATIENT  Once         01/05/22 1630     Place sequential compression device  Until discontinued         01/05/22 1630                Rui Doherty Md, MD  Cardiology  [facility] - Telemetry ([facility])

## 2022-01-07 NOTE — CONSULTS
"[facility] - Telemetry ([facility])  Wound Care    Patient Name:  Jaiver Barrera   MRN:  [id number]  Date: 1/6/2022  Diagnosis: Acute CVA (cerebrovascular accident)    History:     No past medical history on file.    Social History     Socioeconomic History    Marital status:    Tobacco Use    Smoking status: Unknown If Ever Smoked   Substance and Sexual Activity    Alcohol use: Not Currently    Drug use: Never    Sexual activity: Not Currently       Precautions:     Allergies as of 01/05/2022 - Reviewed 01/05/2022   Allergen Reaction Noted    Penicillins Swelling 05/27/2021    Atorvastatin Other (See Comments) 08/05/2021       [facility] Assessment Details/Treatment     Consulted to this 66 year old male patient for present on admission wounds. past medical history of prediabetes and hyperlipidemia sent to ER by his neuro ophthalmologist due to acute CVA seen today on outpatient MRI. Covid positive. Patient is awake and alert. Small intact callus noted to his second right toe, intact, flesh colored. No care needed at this time. Multiple scattered intact scabs (abdomen, groin, buttocks. Also ulceration noted  inside of his nose (and entire nose is swollen and red). he states scabs start as a "cluster of black heads" and began about a month ago. he said they itch, noted to be picking and scratching. Notified hospital medicine. Patient may benefit from topical treatment. Will follow hospital med recommendations.      01/06/2022  "

## 2022-01-07 NOTE — CARE UPDATE
Discussed with pt the importance of statin in the treatment of stroke and vascular atherosclerotic disease with LDL goal <70.   Pt states that  he had tried different statin already and declined to try further due to previous experience of intolerance . Therefore statin removed from treatment plan.

## 2022-01-07 NOTE — PLAN OF CARE
[facility] - Telemetry (Hospital)  Initial Discharge Assessment       Primary Care Provider: Primary Doctor No    Admission Diagnosis: CVA (cerebral vascular accident) [I63.9]  Acute CVA (cerebrovascular accident) [I63.9]  Stroke due to embolism [I63.9]  Cerebrovascular accident (CVA), unspecified mechanism [I63.9]  COVID-19 virus infection [U07.1]    Admission Date: 1/5/2022  Expected Discharge Date:     Discharge Barriers Identified: None    Payor: MEDICARE / Plan: MEDICARE PART A & B / Product Type: Government /     Extended Emergency Contact Information  Primary Emergency Contact: Irving Santoro  Mobile Phone: [phone]  Relation: Friend  Preferred language: English   needed? No    Discharge Plan A: Home         [facility] - [place] - [address]  [address] [address] [address]  Phone: [phone] Fax: [phone]      Initial Assessment (most recent)     Adult Discharge Assessment - 01/07/22 0829        Discharge Assessment    Assessment Type Discharge Planning Assessment     Confirmed/corrected address, phone number and insurance Yes     Confirmed Demographics Correct on Facesheet     Source of Information health record     Communicated OTIS with patient/caregiver Date not available/Unable to determine     Reason For Admission CVA, COVID pos     Lives With alone     Prior to hospitilization cognitive status: Alert/Oriented     Current cognitive status: Alert/Oriented     Walking or Climbing Stairs Difficulty none     Dressing/Bathing Difficulty none     Home Layout Bedroom on 2nd floor     Equipment Currently Used at Home none     Readmission within 30 days? No     Patient currently being followed by outpatient case management? No     Do you currently have service(s) that help you manage your care at home? No     Do you take prescription medications? Yes     Do you have prescription coverage? Yes     Do you have any problems affording any of your prescribed medications? No     Is  the patient taking medications as prescribed? yes     How do you get to doctors appointments? car, drives self     Are you on dialysis? No     Discharge Plan A Home     DME Needed Upon Discharge  none     Discharge Barriers Identified None                 Anticipated DC: home   Progress towards plan: per PT/OT recs for outpt therapy upon d/c  PLOF: independent, no DME  Comments:  Patient not answering phone, assessment performed per chart review and conversation with friend, Irving, to confirm address/contact info. Per Irving listed address is patient's sister's. States patient stays with sister of and on, confirms patient will have a place to d/c to upon d/c. No identified CM needs at this time.

## 2022-01-07 NOTE — PROGRESS NOTES
[facility] Medicine  Progress Note    Patient Name: Javier Barrera  MRN: [id number]  Patient Class: IP- Inpatient   Admission Date: 1/5/2022  Length of Stay: 1 days  Attending Physician: Henri De La Torre, *  Primary Care Provider: Primary Doctor No        Subjective:     Principal Problem:Acute CVA (cerebrovascular accident)        HPI:  Mr. Morfin is a 66-year-old male with a past medical history of prediabetes and hyperlipidemia sent to ER by his neuro ophthalmologist due to acute CVA seen today on outpatient MRI.  Mr. Barrera has been having vision loss or scotoma in the last few weeks which prompted referral to ophthalmologist.  MRI today shows subacute right occipital and left cerebellar strokes.    Workup:  -white blood cell count 9.6  -Hemoglobin 11.8  Creatinine 2.6 (baseline 1.3 December 2021)  -GFR 24  -vital signs:  Temperature 97.9°, heart rate 93, respiratory rate 16, blood pressure 145/67, oxygen saturation 96% on room air    In the ER he received Toradol injection and was admitted for further evaluation and treatment of multiple posterior strokes.  At the time of this interview and physical exam Mr. Morfin is complaining of dizziness as well as itching in his nose, umbilical area and right groin which he is scratching compulsively there are open wounds in right groin and umbilical area.  He denies headache, chest pain, shortness of breath, nausea, vomiting, diarrhea denies falls.      Overview/Hospital Course:  Pt with H/O HTN, DM, cervical radiculopathy, carpal tunnel syndrome bilaterally, former  Smoker admitted for further evaluation and treatment of acute CVA involving the left cerebellar hemisphere and right occipital lobe. Pt with c/o 2 -3 mos h/o visual disturbance and intermittent dizziness. 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 Of note, pt tested positive for COVID-19  on 12/31/21.     1/6/22- Pt started on ASA, Plavix and statin . Neurology consult obtained and suggested JAVAN and CTA head/Neck. Cardiology suggested to continue dual antiplatelet and statin and JAVAN and/or Loop recorder as out patient once pt is COVID free. AC not initiated at this time. CTA head and neck will be done tomorrow once kidney function is favorable. ST cleared pt for regular diet. PT/OT suggested out patient treatment. Gentle hydration continued for SANTA.    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 Case D/W neurology which recommend  cont DAPT .       Interval History:     Review of Systems   Constitutional: Negative for activity change, appetite change and fever.   HENT: Negative for congestion and sore throat.    Eyes: Positive for visual disturbance.   Respiratory: Negative for cough, chest tightness and shortness of breath.    Cardiovascular: Negative for chest pain, palpitations and leg swelling.   Gastrointestinal: Negative for abdominal distention, abdominal pain, constipation, diarrhea, nausea and vomiting.   Endocrine: Negative for polyuria.   Genitourinary: Negative for decreased urine volume, dysuria, flank pain, frequency and hematuria.   Musculoskeletal: Negative for back pain and gait problem.   Skin: Negative for rash.   Neurological: Negative for dizziness, syncope, speech difficulty, weakness, light-headedness and headaches.   Psychiatric/Behavioral: Negative for confusion, hallucinations and sleep disturbance.     Objective:     Vital Signs (Most Recent):  Temp: 98.6 °F (37 °C) (01/07/22 1548)  Pulse: 88 (01/07/22 1701)  Resp: 18 (01/07/22 1548)  BP: 114/69 (01/07/22 1548)  SpO2: (!) 94 % (01/07/22 1548) Vital Signs (24h Range):  Temp:  [98 °F (36.7 °C)-98.9 °F (37.2 °C)] 98.6 °F (37 °C)  Pulse:  [] 88  Resp:   [18-20] 18  SpO2:  [94 %-99 %] 94 %  BP: (102-135)/(50-69) 114/69     Weight: 91.2 kg (201 lb)  Body mass index is 32.44 kg/m².    Intake/Output Summary (Last 24 hours) at 1/7/2022 1756  Last data filed at 1/7/2022 0600  Gross per 24 hour   Intake 1500 ml   Output 400 ml   Net 1100 ml      Physical Exam  Constitutional:       General: He is not in acute distress.     Appearance: He is well-developed. He is not diaphoretic.   HENT:      Head: Normocephalic and atraumatic.      Mouth/Throat:      Pharynx: No oropharyngeal exudate.   Eyes:      Conjunctiva/sclera: Conjunctivae normal.      Pupils: Pupils are equal, round, and reactive to light.   Neck:      Thyroid: No thyromegaly.      Vascular: No JVD.   Cardiovascular:      Rate and Rhythm: Normal rate and regular rhythm.      Heart sounds: Normal heart sounds. No murmur heard.      Pulmonary:      Effort: Pulmonary effort is normal. No respiratory distress.      Breath sounds: Normal breath sounds. No wheezing or rales.   Chest:      Chest wall: No tenderness.   Abdominal:      General: Bowel sounds are normal. There is no distension.      Palpations: Abdomen is soft.      Tenderness: There is no abdominal tenderness. There is no guarding or rebound.   Musculoskeletal:         General: Normal range of motion.      Cervical back: Normal range of motion and neck supple.   Lymphadenopathy:      Cervical: No cervical adenopathy.   Skin:     General: Skin is warm and dry.      Findings: No rash.   Neurological:      Mental Status: He is alert and oriented to person, place, and time.      Cranial Nerves: No cranial nerve deficit.      Sensory: No sensory deficit.      Deep Tendon Reflexes: Reflexes normal.         Significant Labs: All pertinent labs within the past 24 hours have been reviewed.    Significant Imaging: I have reviewed all pertinent imaging results/findings within the past 24 hours.      Assessment/Plan:      * Acute CVA (cerebrovascular accident)  --admit  to telemetry, observation status  --due to SANTA patient will not be able to undergo MR angio of the head and neck with contrast  --case discussed with Dr. Ramos on-call neurologist, he recommends noncontrast MRA of the brain and carotid ultrasound of the neck  --pureed diet while we await speech language pathology evaluation  --lipid panel  --check hemoglobin A1c  --permissive hypertension times 24 hours  --neurology consult    1/6/22-  -Pt started on ASA, Plavix and statin .   -Neurology consult obtained and suggested JAVAN and CTA head/Neck.   -Cardiology suggested to continue dual antiplatelet and statin and JAVAN and/or Loop recorder as out patient once pt is COVID free. AC not initiated at this time.   -CTA Head and neck  show Small, nonocclusive arterial thrombus within the left subclavian artery just proximal to the origin of the left vertebral artery. Case D/W neurology which recommend  cont DAPT   -ST cleared pt for regular diet.   -PT/OT suggested out patient treatment.   -Gentle hydration continued for SANTA.      COVID-19 virus detected  -Pt is fairly asymptomatic from COVID -19 virus infection stand point.  -Remains on RA, denies SOB  -CXR with clear lungs   -Inflammatory markers are favorable     Cervical disc disease  - Pt c/o neck pain and headaches improved with Neurontin and analgesics   -Further evaluation and treatment as outpatient       SANTA (acute kidney injury)  --patient has normal kidney function at baseline  --fluid resuscitation with lactated Ringer's due to borderline hypernatremia  1/6/22-  -Renal function is improving         Tinea cruris  -Topical antifungal and oral Diflucan       Compulsive scratching behavior  Cont supportive     Prediabetes  -- hemoglobin A1c 6.1  --sliding scale insulin low intensity      Mixed hyperlipidemia  --patient is allergic to statin will start niacin instead  -Pt states he tried different statin and does not want to try again         VTE Risk Mitigation (From  admission, onward)         Ordered     heparin (porcine) injection 5,000 Units  Every 8 hours         01/05/22 1630     IP VTE HIGH RISK PATIENT  Once         01/05/22 1630     Place sequential compression device  Until discontinued         01/05/22 1630                Discharge Planning   OTIS:      Code Status: Full Code   Is the patient medically ready for discharge?:     Reason for patient still in hospital (select all that apply): Treatment  Discharge Plan A: Home                  Henri De La Torre MD  Department of Hospital Medicine   [facility] - Telemetry ([facility])

## 2022-01-07 NOTE — PLAN OF CARE
POC reviewed with pt. Pt verbalizes understanding of POC. No questions at this time.  AAOx3. NADN.  NSR on cardiac monitor.  Pt remains free of falls.   Room Air.   Glucose Monitoring.   CTA of head and neck today.   No complaints at this tiht.me.  Safety measures in place. Will continue to monitor.  Informed pt to call for assistance before getting up. Pt verbalizes understanding.

## 2022-01-07 NOTE — ASSESSMENT & PLAN NOTE
--admit to telemetry, observation status  --due to SANTA patient will not be able to undergo MR angio of the head and neck with contrast  --case discussed with Dr. Ramos on-call neurologist, he recommends noncontrast MRA of the brain and carotid ultrasound of the neck  --pureed diet while we await speech language pathology evaluation  --lipid panel  --check hemoglobin A1c  --permissive hypertension times 24 hours  --neurology consult    1/6/22-  -Pt started on ASA, Plavix and statin .   -Neurology consult obtained and suggested JAVAN and CTA head/Neck.   -Cardiology suggested to continue dual antiplatelet and statin and JAVAN and/or Loop recorder as out patient once pt is COVID free. AC not initiated at this time.   -CTA Head and neck  show Small, nonocclusive arterial thrombus within the left subclavian artery just proximal to the origin of the left vertebral artery. Case D/W neurology which recommend  cont DAPT   -ST cleared pt for regular diet.   -PT/OT suggested out patient treatment.   -Gentle hydration continued for SANTA.

## 2022-01-07 NOTE — SUBJECTIVE & OBJECTIVE
Interval History:     Review of Systems   Constitutional: Negative for activity change, appetite change and fever.   HENT: Negative for congestion and sore throat.    Eyes: Positive for visual disturbance.   Respiratory: Negative for cough, chest tightness and shortness of breath.    Cardiovascular: Negative for chest pain, palpitations and leg swelling.   Gastrointestinal: Negative for abdominal distention, abdominal pain, constipation, diarrhea, nausea and vomiting.   Endocrine: Negative for polyuria.   Genitourinary: Negative for decreased urine volume, dysuria, flank pain, frequency and hematuria.   Musculoskeletal: Negative for back pain and gait problem.   Skin: Negative for rash.   Neurological: Negative for dizziness, syncope, speech difficulty, weakness, light-headedness and headaches.   Psychiatric/Behavioral: Negative for confusion, hallucinations and sleep disturbance.     Objective:     Vital Signs (Most Recent):  Temp: 98.6 °F (37 °C) (01/07/22 1548)  Pulse: 88 (01/07/22 1701)  Resp: 18 (01/07/22 1548)  BP: 114/69 (01/07/22 1548)  SpO2: (!) 94 % (01/07/22 1548) Vital Signs (24h Range):  Temp:  [98 °F (36.7 °C)-98.9 °F (37.2 °C)] 98.6 °F (37 °C)  Pulse:  [] 88  Resp:  [18-20] 18  SpO2:  [94 %-99 %] 94 %  BP: (102-135)/(50-69) 114/69     Weight: 91.2 kg (201 lb)  Body mass index is 32.44 kg/m².    Intake/Output Summary (Last 24 hours) at 1/7/2022 1753  Last data filed at 1/7/2022 0600  Gross per 24 hour   Intake 1500 ml   Output 400 ml   Net 1100 ml      Physical Exam  Constitutional:       General: He is not in acute distress.     Appearance: He is well-developed. He is not diaphoretic.   HENT:      Head: Normocephalic and atraumatic.      Mouth/Throat:      Pharynx: No oropharyngeal exudate.   Eyes:      Conjunctiva/sclera: Conjunctivae normal.      Pupils: Pupils are equal, round, and reactive to light.   Neck:      Thyroid: No thyromegaly.      Vascular: No JVD.   Cardiovascular:      Rate and  Rhythm: Normal rate and regular rhythm.      Heart sounds: Normal heart sounds. No murmur heard.      Pulmonary:      Effort: Pulmonary effort is normal. No respiratory distress.      Breath sounds: Normal breath sounds. No wheezing or rales.   Chest:      Chest wall: No tenderness.   Abdominal:      General: Bowel sounds are normal. There is no distension.      Palpations: Abdomen is soft.      Tenderness: There is no abdominal tenderness. There is no guarding or rebound.   Musculoskeletal:         General: Normal range of motion.      Cervical back: Normal range of motion and neck supple.   Lymphadenopathy:      Cervical: No cervical adenopathy.   Skin:     General: Skin is warm and dry.      Findings: No rash.   Neurological:      Mental Status: He is alert and oriented to person, place, and time.      Cranial Nerves: No cranial nerve deficit.      Sensory: No sensory deficit.      Deep Tendon Reflexes: Reflexes normal.         Significant Labs: All pertinent labs within the past 24 hours have been reviewed.    Significant Imaging: I have reviewed all pertinent imaging results/findings within the past 24 hours.

## 2022-01-07 NOTE — PLAN OF CARE
AOx4. Able to verbalize needs & follow commands.   POC reviewed w/ pt; interventions implemented as appropriate.    Calm & cooperative at this time.  VSS; SR on tele-monitor.  On room air.  PIV patent.    C/o back & neck pain.   POCT glucose AC/HS. No SSI required.  Ambulates independently. Activity ad arpita.    Urinal w/in reach.      Able to reposition self in bed. Frequent position changes encouraged. Educated on s/sx of  DTI.  NADN. Resting quietly in bed. Free of falls. Hourly rounding complete.   All safety measures remain in place. SR up x2; bed low & locked. Call light w/in reach.   Will continue to monitor throughout shift.

## 2022-01-08 VITALS
SYSTOLIC BLOOD PRESSURE: 112 MMHG | DIASTOLIC BLOOD PRESSURE: 57 MMHG | HEIGHT: 66 IN | BODY MASS INDEX: 32.3 KG/M2 | OXYGEN SATURATION: 98 % | RESPIRATION RATE: 17 BRPM | TEMPERATURE: 98 F | HEART RATE: 72 BPM | WEIGHT: 201 LBS

## 2022-01-08 LAB
ANION GAP SERPL CALC-SCNC: 11 MMOL/L (ref 8–16)
BUN SERPL-MCNC: 22 MG/DL (ref 8–23)
CALCIUM SERPL-MCNC: 9.6 MG/DL (ref 8.7–10.5)
CHLORIDE SERPL-SCNC: 105 MMOL/L (ref 95–110)
CO2 SERPL-SCNC: 23 MMOL/L (ref 23–29)
CREAT SERPL-MCNC: 1.3 MG/DL (ref 0.5–1.4)
EST. GFR  (AFRICAN AMERICAN): >60 ML/MIN/1.73 M^2
EST. GFR  (NON AFRICAN AMERICAN): 56 ML/MIN/1.73 M^2
GLUCOSE SERPL-MCNC: 97 MG/DL (ref 70–110)
POCT GLUCOSE: 127 MG/DL (ref 70–110)
POTASSIUM SERPL-SCNC: 4.9 MMOL/L (ref 3.5–5.1)
SODIUM SERPL-SCNC: 139 MMOL/L (ref 136–145)

## 2022-01-08 PROCEDURE — 63600175 PHARM REV CODE 636 W HCPCS: Performed by: FAMILY MEDICINE

## 2022-01-08 PROCEDURE — 99232 PR SUBSEQUENT HOSPITAL CARE,LEVL II: ICD-10-PCS | Mod: ,,, | Performed by: PSYCHIATRY & NEUROLOGY

## 2022-01-08 PROCEDURE — 80048 BASIC METABOLIC PNL TOTAL CA: CPT | Performed by: INTERNAL MEDICINE

## 2022-01-08 PROCEDURE — 36415 COLL VENOUS BLD VENIPUNCTURE: CPT | Performed by: INTERNAL MEDICINE

## 2022-01-08 PROCEDURE — 25000003 PHARM REV CODE 250: Performed by: INTERNAL MEDICINE

## 2022-01-08 PROCEDURE — 25000003 PHARM REV CODE 250: Performed by: FAMILY MEDICINE

## 2022-01-08 PROCEDURE — 63700000 PHARM REV CODE 250 ALT 637 W/O HCPCS: Performed by: INTERNAL MEDICINE

## 2022-01-08 PROCEDURE — 99232 SBSQ HOSP IP/OBS MODERATE 35: CPT | Mod: ,,, | Performed by: PSYCHIATRY & NEUROLOGY

## 2022-01-08 PROCEDURE — 94761 N-INVAS EAR/PLS OXIMETRY MLT: CPT

## 2022-01-08 RX ORDER — EZETIMIBE 10 MG/1
10 TABLET ORAL NIGHTLY
Qty: 30 TABLET | Refills: 3 | Status: SHIPPED | OUTPATIENT
Start: 2022-01-08 | End: 2022-05-05

## 2022-01-08 RX ORDER — ASPIRIN 81 MG/1
81 TABLET ORAL DAILY
Qty: 90 TABLET | Refills: 3 | Status: SHIPPED | OUTPATIENT
Start: 2022-01-09 | End: 2023-04-13

## 2022-01-08 RX ORDER — HYDROCODONE BITARTRATE AND ACETAMINOPHEN 5; 325 MG/1; MG/1
1 TABLET ORAL EVERY 6 HOURS PRN
Qty: 15 TABLET | Refills: 0 | Status: SHIPPED | OUTPATIENT
Start: 2022-01-08 | End: 2022-01-21 | Stop reason: SDUPTHER

## 2022-01-08 RX ORDER — CLOPIDOGREL BISULFATE 75 MG/1
75 TABLET ORAL DAILY
Qty: 90 TABLET | Refills: 0 | Status: SHIPPED | OUTPATIENT
Start: 2022-01-09 | End: 2023-04-13 | Stop reason: SDUPTHER

## 2022-01-08 RX ADMIN — HYDROCODONE BITARTRATE AND ACETAMINOPHEN 1 TABLET: 5; 325 TABLET ORAL at 04:01

## 2022-01-08 RX ADMIN — Medication 5000 UNITS: at 09:01

## 2022-01-08 RX ADMIN — HEPARIN SODIUM 5000 UNITS: 5000 INJECTION INTRAVENOUS; SUBCUTANEOUS at 06:01

## 2022-01-08 RX ADMIN — CLOPIDOGREL 75 MG: 75 TABLET, FILM COATED ORAL at 09:01

## 2022-01-08 RX ADMIN — FLUCONAZOLE 200 MG: 100 TABLET ORAL at 09:01

## 2022-01-08 RX ADMIN — NYSTATIN AND TRIAMCINOLONE ACETONIDE: 100000; 1 OINTMENT TOPICAL at 09:01

## 2022-01-08 RX ADMIN — GABAPENTIN 300 MG: 300 CAPSULE ORAL at 09:01

## 2022-01-08 RX ADMIN — AMLODIPINE BESYLATE 10 MG: 10 TABLET ORAL at 09:01

## 2022-01-08 RX ADMIN — HYDROCODONE BITARTRATE AND ACETAMINOPHEN 1 TABLET: 5; 325 TABLET ORAL at 10:01

## 2022-01-08 RX ADMIN — ASPIRIN 81 MG: 81 TABLET, COATED ORAL at 09:01

## 2022-01-08 RX ADMIN — OXYCODONE HYDROCHLORIDE AND ACETAMINOPHEN 500 MG: 500 TABLET ORAL at 09:01

## 2022-01-08 NOTE — PLAN OF CARE
Patient AAOX 4. VSS.. Patient remained afebrile throughout shift.   IV fluids administered per order Patient remained free of falls this shift  Patient complains of generalized pain this shift. pain medication administered as ordered. blood glucose monitored, corrected via SSI.   Plan of care reviewed. Patient verbalized understanding.  Patient moving/turning independently. Frequent weight shifting encouraged.   Patient SR on monitor   Bed low, side rails up x2, wheels locked, call light in reach. Bed alarm maintained for safety. Patient instructed to call for assistance.   Hourly rounding completed.   Will continue to monitor.

## 2022-01-08 NOTE — PROGRESS NOTES
[facility] ([facility])  Neurology  Progress Note    Patient Name: Javier Barrera  MRN: [id number]  Admission Date: 1/5/2022  Hospital Length of Stay: 2 days  Code Status: Full Code   Attending Provider: Henri De La Torre, *  Primary Care Physician: Primary Doctor No   Principal Problem:Acute CVA (cerebrovascular accident)    Subjective:     Interval History:   No major events reported overnight.  Patient reports left posterior neck pain, left temporal pain and feels this is related to his neck problems.  Denies any tenderness on palpating temporal side of his head or jaw claudication.    CTA of the head and neck:  Impression:     Small, nonocclusive arterial thrombus within the left subclavian artery just proximal to the origin of the left vertebral artery.     Atherosclerotic disease of the bilateral proximal internal carotid arteries with less than 50% stenosis.  Posterolateral luminal irregularity of the proximal left ICA may reflect irregular atherosclerotic disease or tiny ulcerated plaque.     Bilateral vertebral arteries are patent.     No intracranial large vessel occlusion, hemodynamically significant stenosis or aneurysm.     Acute left cerebellar infarct.  No acute intracranial hemorrhage.      Labs during admission:  CRP 30.1  ANCA panel pending  MARGUERITE negative  B12 401    A1c 6.1  LDL 90, cholesterol 151      Prior history:     Patient is a 66-year-old man with past medical history of hypertension, DM, cervical radiculopathy, carpal tunnel syndrome bilaterally, previous smoker, who was admitted with concerns of ischemic stroke.  Over the past 2-3 months, patient has been experiencing visual field loss, intermittent dizziness.  Patient has been seeing Neuro-Ophthalmology, was found to have ischemic optic neuropathy bilaterally from eye exam.  Further workup included MRI of the brain which revealed:  Multiple small areas of recent infarction (acute or early subacute) involving the left  cerebellar hemisphere and right occipital lobe as above.  No significant mass effect or associated hemorrhage.     Consider further evaluation with dedicated vascular imaging (i.e.  CTA head and neck).     Modest chronic microvascular ischemic change and small remote left frontal cortical infarct.     Small defect in the frontal lobe (middle frontal gyrus) as above, possibly remote infarct versus developmental abnormality.        Patient reports he has been taking aspirin pretty regularly prior to admission.  Inflammatory markers from ophthalmology workup were negative for GCA.     Additional labs:  H&H 11.8/36.3, WBC 9.6  Sodium 140, BUN 62, creatinine 2.6            Current Neurological Medications:     Current Facility-Administered Medications   Medication Dose Route Frequency Provider Last Rate Last Admin    0.9%  NaCl infusion   Intravenous Continuous Henri Pumaraul De La Torre MD 75 mL/hr at 01/07/22 2034 New Bag at 01/07/22 2034    acetaminophen tablet 650 mg  650 mg Oral Q6H PRN Elvis Elizondo MD   650 mg at 01/05/22 2112    amLODIPine tablet 10 mg  10 mg Oral Daily Elvis Elizondo MD   10 mg at 01/08/22 0924    ascorbic acid (vitamin C) tablet 500 mg  500 mg Oral Daily Jasmyne Alvarez MD   500 mg at 01/08/22 0924    aspirin EC tablet 81 mg  81 mg Oral Daily Elvis Elizondo MD   81 mg at 01/08/22 0924    cholecalciferol (vitamin D3) 125 mcg (5,000 unit) tablet 5,000 Units  5,000 Units Oral Daily Jasmyne Alvarez MD   5,000 Units at 01/08/22 0924    clopidogreL tablet 75 mg  75 mg Oral Daily Jasmyne Alvarez MD   75 mg at 01/08/22 0924    dextrose 50% injection 12.5 g  12.5 g Intravenous PRN Jasmyne Alvarez MD        dextrose 50% injection 25 g  25 g Intravenous PRN Jasmyne Alvarez MD        ezetimibe tablet 10 mg  10 mg Oral QHS Rui Doherty MD   10 mg at 01/07/22 2028    fluconazole tablet 200 mg  200 mg Oral Daily Jasmyne Alvarez MD   200 mg at 01/08/22 0924    gabapentin capsule 300 mg  300 mg  Oral BID Jasmyne Alvarez MD   300 mg at 01/08/22 0924    glucagon (human recombinant) injection 1 mg  1 mg Intramuscular PRN Jasmyne Alvarez MD        glucose chewable tablet 16 g  16 g Oral PRN Jasmyne Alvarez MD        glucose chewable tablet 24 g  24 g Oral PRN Jasmyne Alvarez MD        heparin (porcine) injection 5,000 Units  5,000 Units Subcutaneous Q8H Elvis Elizondo MD   5,000 Units at 01/08/22 0610    HYDROcodone-acetaminophen 5-325 mg per tablet 1 tablet  1 tablet Oral Q6H PRN Jasmyne Alvarez MD   1 tablet at 01/08/22 1001    insulin aspart U-100 pen 0-5 Units  0-5 Units Subcutaneous QID (AC + HS) PRN Jasmyne Alvarez MD        metoprolol injection 5 mg  5 mg Intravenous Q4H PRN Elvis Elizondo MD        niacin SR tablet 1,000 mg  1,000 mg Oral QHS Elvis Elizondo MD   1,000 mg at 01/07/22 2027    nystatin-triamcinolone ointment   Topical (Top) BID Jasmyne Alvarez MD   Given at 01/08/22 0924    sodium chloride 0.9% flush 10 mL  10 mL Intravenous PRN Elvis Elizondo MD           Review of Systems     Constitutional: Negative.    HENT: Negative.    Eyes: Negative.    Respiratory: Negative.    Cardiovascular: Negative.    Gastrointestinal: Negative.    Endocrine: Negative.    Genitourinary: Negative.    Musculoskeletal: Negative.    Skin: Negative.    Allergic/Immunologic: Negative.    Neurological: Positive for dizziness and numbness.   Hematological: Negative.    Psychiatric/Behavioral: Negative.               Objective:     Vital Signs (Most Recent):  Temp: 97.9 °F (36.6 °C) (01/08/22 0923)  Pulse: 90 (01/08/22 0923)  Resp: 18 (01/08/22 1001)  BP: 114/62 (01/08/22 0923)  SpO2: 96 % (01/08/22 0923) Vital Signs (24h Range):  Temp:  [97.9 °F (36.6 °C)-98.9 °F (37.2 °C)] 97.9 °F (36.6 °C)  Pulse:  [] 90  Resp:  [16-20] 18  SpO2:  [94 %-99 %] 96 %  BP: (114-135)/(57-69) 114/62     Weight: 91.2 kg (201 lb)  Body mass index is 32.44 kg/m².    Physical Exam  HENT:      Head: Normocephalic.       Nose: Nose normal.      Mouth/Throat:      Mouth: Mucous membranes are moist.   Eyes:      Extraocular Movements: Extraocular movements intact and EOM normal.      Pupils: Pupils are equal, round, and reactive to light.   Cardiovascular:      Rate and Rhythm: Normal rate and regular rhythm.      Pulses: Normal pulses.      Heart sounds: Normal heart sounds.   Pulmonary:      Effort: Pulmonary effort is normal.      Breath sounds: Normal breath sounds.   Abdominal:      General: Abdomen is flat. Bowel sounds are normal.   Musculoskeletal:         General: Normal range of motion.   Skin:     General: Skin is warm.   Neurological:      Mental Status: He is alert and oriented to person, place, and time.      Coordination: Finger-Nose-Finger Test normal.      Deep Tendon Reflexes: Strength normal.      Reflex Scores:       Tricep reflexes are 1+ on the right side and 1+ on the left side.       Bicep reflexes are 1+ on the right side and 1+ on the left side.       Brachioradialis reflexes are 1+ on the right side and 1+ on the left side.       Patellar reflexes are 1+ on the right side and 1+ on the left side.       Achilles reflexes are 1+ on the right side and 1+ on the left side.  Psychiatric:         Mood and Affect: Mood normal.            NEUROLOGICAL EXAMINATION:      MENTAL STATUS   Oriented to person, place, and time.   Normal comprehension.      CRANIAL NERVES      CN II   Visual acuity: normal  Left visual field deficit: lower temporal and upper temporal quadrant(s)     CN III, IV, VI   Pupils are equal, round, and reactive to light.  Extraocular motions are normal.      CN V   Facial sensation intact.      CN VII   Facial expression full, symmetric.      CN VIII   CN VIII normal.      CN IX, X   CN IX normal.      CN XI   CN XI normal.      CN XII   CN XII normal.      MOTOR EXAM   Overall muscle tone: normal     Strength   Strength 5/5 throughout.      REFLEXES      Reflexes   Right brachioradialis:  1+  Left brachioradialis: 1+  Right biceps: 1+  Left biceps: 1+  Right triceps: 1+  Left triceps: 1+  Right patellar: 1+  Left patellar: 1+  Right achilles: 1+  Left achilles: 1+  Right : 1+  Left : 1+  Right plantar: normal  Left plantar: normal     SENSORY EXAM   Light touch normal.      GAIT AND COORDINATION      Coordination   Finger to nose coordination: normal       Gait exam deferred due to patient's condition                 Significant Labs:   BMP:   Recent Labs   Lab 01/06/22  1230 01/07/22  0443    123*    136   K 5.1 5.2*    105   CO2 25 23   BUN 35* 30*   CREATININE 1.2 1.3   CALCIUM 9.7 9.1   MG  --  1.8     CBC: No results for input(s): WBC, HGB, HCT, PLT in the last 48 hours.    Significant Imaging: I have reviewed all pertinent imaging results/findings within the past 24 hours.    Assessment and Plan:     Reviewed workup so far, patient has a small nonocclusive thrombus within the left subclavian artery proximal to left vertebral artery.  Also has atherosclerotic disease in the left ICA but no large vessel occlusion.    MRI of the brain noted for multiple small areas of acute/subacute infarcts within left cerebellar region and right occipital region as well.  CTA of the head not compatible with vasculitis.  Suspect mechanism of stroke is artery to artery mechanism given neck vascular imaging results.  Patient is also COVID-19 positive.    Unable to obtain trans esophageal echo, Cardiology prefers as an outpatient given positive COVID test.  Of note, inflammatory markers were negative during office visit with Neuro-Ophthalmology but elevated during hospitalization.  He does reports some mild headaches, found to have bilateral ischemic optic neuropathy with Neuro-Ophthalmology.  He tells me however his headaches have been chronic, relates this to his posterior neck pain.  Recommend patient to be discharged with medrol dose pack.  He will need follow-up with  Neuro-Ophthalmology, could consider retesting inflammatory markers again as outpatient to determine whether he needs temporal artery biopsy.    He also needs to to establish care with neurovascular team in [place], needs transesophageal echocardiogram as an outpatient.    Plan:  Continue dual antiplatelet therapy x3 months, can then switch to Plavix monotherapy  Patient unable to tolerate statin  Establish care with neurovascular team for an opinion  Follow-up with Neuro-Ophthalmology again, may require retesting inflammatory markers, consider temporal artery biopsy  Medrol Dosepak to  ANCA panel pending              Active Diagnoses:    Diagnosis Date Noted POA    PRINCIPAL PROBLEM:  Acute CVA (cerebrovascular accident) [I63.9] 01/05/2022 Yes    Cervical disc disease [M50.90] 01/06/2022 Yes    COVID-19 virus detected [U07.1] 01/06/2022 Yes    Compulsive scratching behavior [R46.89] 01/05/2022 Yes    Tinea cruris [B35.6] 01/05/2022 Yes    SANTA (acute kidney injury) [N17.9] 01/05/2022 Yes    Mixed hyperlipidemia [E78.2] 12/11/2020 Yes    Prediabetes [R73.03] 12/11/2020 Yes      Problems Resolved During this Admission:       VTE Risk Mitigation (From admission, onward)         Ordered     heparin (porcine) injection 5,000 Units  Every 8 hours         01/05/22 1630     IP VTE HIGH RISK PATIENT  Once         01/05/22 1630     Place sequential compression device  Until discontinued         01/05/22 1630                Chidi Ramos MD  Neurology  [facility] ([facility])

## 2022-01-08 NOTE — PLAN OF CARE
Problem: Adult Inpatient Plan of Care  Goal: Plan of Care Review  Outcome: Met  Goal: Patient-Specific Goal (Individualized)  Outcome: Met  Goal: Absence of Hospital-Acquired Illness or Injury  Outcome: Met  Goal: Optimal Comfort and Wellbeing  Outcome: Met  Goal: Readiness for Transition of Care  Outcome: Met     Problem: Infection  Goal: Absence of Infection Signs and Symptoms  Outcome: Met     Problem: Adjustment to Illness (Stroke, Ischemic/Transient Ischemic Attack)  Goal: Optimal Coping  Outcome: Met     Problem: Bowel Elimination Impaired (Stroke, Ischemic/Transient Ischemic Attack)  Goal: Effective Bowel Elimination  Outcome: Met     Problem: Cerebral Tissue Perfusion (Stroke, Ischemic/Transient Ischemic Attack)  Goal: Optimal Cerebral Tissue Perfusion  Outcome: Met     Problem: Cognitive Impairment (Stroke, Ischemic/Transient Ischemic Attack)  Goal: Optimal Cognitive Function  Outcome: Met     Problem: Communication Impairment (Stroke, Ischemic/Transient Ischemic Attack)  Goal: Improved Communication Skills  Outcome: Met     Problem: Functional Ability Impaired (Stroke, Ischemic/Transient Ischemic Attack)  Goal: Optimal Functional Ability  Outcome: Met     Problem: Respiratory Compromise (Stroke, Ischemic/Transient Ischemic Attack)  Goal: Effective Oxygenation and Ventilation  Outcome: Met     Problem: Sensorimotor Impairment (Stroke, Ischemic/Transient Ischemic Attack)  Goal: Improved Sensorimotor Function  Outcome: Met     Problem: Swallowing Impairment (Stroke, Ischemic/Transient Ischemic Attack)  Goal: Optimal Eating and Swallowing without Aspiration  Outcome: Met     Problem: Urinary Elimination Impaired (Stroke, Ischemic/Transient Ischemic Attack)  Goal: Effective Urinary Elimination  Outcome: Met     Problem: Fluid and Electrolyte Imbalance (Acute Kidney Injury/Impairment)  Goal: Fluid and Electrolyte Balance  Outcome: Met     Problem: Oral Intake Inadequate (Acute Kidney Injury/Impairment)  Goal:  Optimal Nutrition Intake  Outcome: Met     Problem: Renal Function Impairment (Acute Kidney Injury/Impairment)  Goal: Effective Renal Function  Outcome: Met     Problem: Impaired Wound Healing  Goal: Optimal Wound Healing  Outcome: Met

## 2022-01-10 ENCOUNTER — PATIENT MESSAGE (OUTPATIENT)
Dept: PHYSICAL MEDICINE AND REHAB | Facility: CLINIC | Age: 67
End: 2022-01-10
Payer: MEDICARE

## 2022-01-10 ENCOUNTER — TELEPHONE (OUTPATIENT)
Dept: OPHTHALMOLOGY | Facility: CLINIC | Age: 67
End: 2022-01-10
Payer: MEDICARE

## 2022-01-10 ENCOUNTER — TELEPHONE (OUTPATIENT)
Dept: PHYSICAL MEDICINE AND REHAB | Facility: CLINIC | Age: 67
End: 2022-01-10
Payer: MEDICARE

## 2022-01-10 DIAGNOSIS — H47.013 NAION (NON-ARTERITIC ANTERIOR ISCHEMIC OPTIC NEUROPATHY), BOTH EYES: Primary | ICD-10-CM

## 2022-01-10 LAB
ANCA AB TITR SER IF: NORMAL TITER
P-ANCA TITR SER IF: NORMAL TITER

## 2022-01-10 NOTE — TELEPHONE ENCOUNTER
Spoke with patient. Recommendeed the [facility] for rehab. Will have him come back to see me in six months.

## 2022-01-10 NOTE — PLAN OF CARE
Patient has referral for home NP visit upon d/c. Message left by CM with [facility] to schedule post hospital f/u as patient does not have an established PCP.

## 2022-01-10 NOTE — TELEPHONE ENCOUNTER
----- Message from Kimmy Goel MA sent at 1/10/2022 12:42 PM CST -----  Contact: [phone]  Patient wants to know what his next step. Went to ED.  ----- Message -----  From: Aurelia Snow  Sent: 1/10/2022   8:11 AM CST  To: [facility] Staff    Pt is calling to get a call back from the dr THAKKAR.    [phone]      Pt would like to discuss a possiable appt.

## 2022-01-10 NOTE — TELEPHONE ENCOUNTER
Reached out to patient after discharge from hospital s/p CVA.  Left VM and message in patient portal explaining purpose of call and role that PMR/Dr. Felix can have in the rehabilitation of patients after such an event. Also left call back number.    Kassandra Dowell RN

## 2022-01-11 ENCOUNTER — TELEPHONE (OUTPATIENT)
Dept: FAMILY MEDICINE | Facility: CLINIC | Age: 67
End: 2022-01-11
Payer: MEDICARE

## 2022-01-11 ENCOUNTER — TELEPHONE (OUTPATIENT)
Dept: PHYSICAL MEDICINE AND REHAB | Facility: CLINIC | Age: 67
End: 2022-01-11
Payer: MEDICARE

## 2022-01-11 NOTE — TELEPHONE ENCOUNTER
----- Message from Juan Johnson sent at 1/10/2022  1:54 PM CST -----  Patient discharged on Saturday, 1/10/22, and is in need of PCP establishment/post hospital f/u. Can you please schedule patient with someone from [facility] (MD or PA) for post hospital f/u. Please contact patient with appt date/time.    Covid pos 12/31    Thank you!

## 2022-01-11 NOTE — TELEPHONE ENCOUNTER
Left message on voice mail to return call and schedule with Sherice Mars or .  Juan Grace informed Dr Majano is currently not accepting any more patients on his panel

## 2022-01-11 NOTE — TELEPHONE ENCOUNTER
Second attempt to reach out to patient after discharge from hospital s/p CVA.  Explained purpose of call and role that PMR/Dr. Felix can have in the rehabilitation of patients after such an event. Left number to call back if interested.    Kassandra Dowell RN

## 2022-01-12 ENCOUNTER — CLINICAL SUPPORT (OUTPATIENT)
Dept: REHABILITATION | Facility: HOSPITAL | Age: 67
End: 2022-01-12
Attending: INTERNAL MEDICINE
Payer: MEDICARE

## 2022-01-12 DIAGNOSIS — I63.9 CEREBROVASCULAR ACCIDENT (CVA), UNSPECIFIED MECHANISM: ICD-10-CM

## 2022-01-12 DIAGNOSIS — Z74.09 IMPAIRED FUNCTIONAL MOBILITY AND ACTIVITY TOLERANCE: ICD-10-CM

## 2022-01-12 PROCEDURE — 97110 THERAPEUTIC EXERCISES: CPT | Mod: PN

## 2022-01-12 PROCEDURE — 97162 PT EVAL MOD COMPLEX 30 MIN: CPT | Mod: PN

## 2022-01-12 NOTE — DISCHARGE SUMMARY
[facility] - Tele[facility] ([facility])  [facility] Medicine  Discharge Summary      Patient Name: Javier Barrera  MRN: [id number]  Patient Class: IP- Inpatient  Admission Date: 1/5/2022  Hospital Length of Stay: 2 days  Discharge Date and Time: 1/8/2022  2:13 PM  Attending Physician: No att. providers found   Discharging Provider: Henri De La Torre MD  Primary Care Provider: Primary Doctor Kayleigh      HPI:   Mr. Morfin is a 66-year-old male with a past medical history of prediabetes and hyperlipidemia sent to ER by his neuro ophthalmologist due to acute CVA seen today on outpatient MRI.  Mr. Barrera has been having vision loss or scotoma in the last few weeks which prompted referral to ophthalmologist.  MRI today shows subacute right occipital and left cerebellar strokes.    Workup:  -white blood cell count 9.6  -Hemoglobin 11.8  Creatinine 2.6 (baseline 1.3 December 2021)  -GFR 24  -vital signs:  Temperature 97.9°, heart rate 93, respiratory rate 16, blood pressure 145/67, oxygen saturation 96% on room air    In the ER he received Toradol injection and was admitted for further evaluation and treatment of multiple posterior strokes.  At the time of this interview and physical exam Mr. Morfin is complaining of dizziness as well as itching in his nose, umbilical area and right groin which he is scratching compulsively there are open wounds in right groin and umbilical area.  He denies headache, chest pain, shortness of breath, nausea, vomiting, diarrhea denies falls.      * No surgery found *      Hospital Course:   Pt with H/O HTN, DM, cervical radiculopathy, carpal tunnel syndrome bilaterally, former  Smoker admitted for further evaluation and treatment of acute CVA involving the left cerebellar hemisphere and right occipital lobe. Pt with c/o 2 -3 mos h/o visual disturbance and intermittent dizziness. 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 Of note, pt tested positive for COVID-19  on 12/31/21.     1/6/22- Pt started on ASA, Plavix and statin . Neurology consult obtained and suggested JAVAN and CTA head/Neck. Cardiology suggested to continue dual antiplatelet and statin and JAVAN and/or Loop recorder as out patient once pt is COVID free. AC not initiated at this time. CTA head and neck will be done tomorrow once kidney function is favorable. ST cleared pt for regular diet. PT/OT suggested out patient treatment. Gentle hydration continued for SANTA.    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 Case D/W neurology which recommend  cont DAPT .     1/8 Pt was seen and examined at bedside . He was determined to be suitable for d/c   Case D/W Neurology which rec DAPT x 3 months    F/U neurovascular team at NO  Outp PT/OT       Goals of Care Treatment Preferences:  Code Status: Full Code      Consults:   Consults (From admission, onward)        Status Ordering Provider     Inpatient consult to Cardiology  Once        Provider:  Rui Doherty MD    Completed JOE DOSS     IP consult to case management/social work  Once        Provider:  (Not yet assigned)    Completed SP CHAMBERLAIN     Inpatient consult to Neurology  Once        Provider:  (Not yet assigned)    Completed SP CHAMBERLAIN          No new Assessment & Plan notes have been filed under this hospital service since the last note was generated.  Service: Hospital Medicine    Final Active Diagnoses:    Diagnosis Date Noted POA    PRINCIPAL PROBLEM:  Acute CVA (cerebrovascular accident) [I63.9] 01/05/2022 Yes    Cervical disc disease [M50.90] 01/06/2022 Yes    COVID-19 virus detected [U07.1] 01/06/2022 Yes    Compulsive scratching behavior [R46.89] 01/05/2022 Yes    Tinea cruris [B35.6] 01/05/2022 Yes    SANTA (acute  kidney injury) [N17.9] 01/05/2022 Yes    Mixed hyperlipidemia [E78.2] 12/11/2020 Yes    Prediabetes [R73.03] 12/11/2020 Yes      Problems Resolved During this Admission:       Discharged Condition: stable    Disposition: Home or Self Care    Follow Up:   Follow-up Information     F/U with PCP in 1 week In 2 weeks.                     Patient Instructions:      Ambulatory referral/consult to Physical/Occupational Therapy   Standing Status: Future   Referral Priority: Routine Referral Type: Physical Medicine   Referral Reason: Specialty Services Required   Number of Visits Requested: 1     Ambulatory referral/consult to [facility]   Standing Status: Future   Referral Priority: Routine Referral Type: Consultation   Referral Reason: Specialty Services Required   Number of Visits Requested: 1     Diet Cardiac     Activity as tolerated       Significant Diagnostic Studies: Labs: BMP: No results for input(s): GLU, NA, K, CL, CO2, BUN, CREATININE, CALCIUM, MG in the last 48 hours., CMP No results for input(s): NA, K, CL, CO2, GLU, BUN, CREATININE, CALCIUM, PROT, ALBUMIN, BILITOT, ALKPHOS, AST, ALT, ANIONGAP, ESTGFRAFRICA, EGFRNONAA in the last 48 hours. and CBC No results for input(s): WBC, HGB, HCT, PLT in the last 48 hours.  Microbiology: Blood Culture No results found for: LABBLOO    Pending Diagnostic Studies:     None         Medications:  Reconciled Home Medications:      Medication List      START taking these medications    aspirin 81 MG EC tablet  Commonly known as: ECOTRIN  Take 1 tablet (81 mg total) by mouth once daily.     clopidogreL 75 mg tablet  Commonly known as: PLAVIX  Take 1 tablet (75 mg total) by mouth once daily.     ezetimibe 10 mg tablet  Commonly known as: ZETIA  Take 1 tablet (10 mg total) by mouth every evening.     HYDROcodone-acetaminophen 5-325 mg per tablet  Commonly known as: NORCO  Take 1 tablet by mouth every 6 (six) hours as needed for Pain.        CONTINUE taking these  medications    amLODIPine 10 MG tablet  Commonly known as: NORVASC  Take 10 mg by mouth once daily.     gabapentin 300 MG capsule  Commonly known as: NEURONTIN  Take 1 capsule (300 mg total) by mouth every evening.     lisinopriL 20 MG tablet  Commonly known as: PRINIVIL,ZESTRIL  Take 20 mg by mouth once daily.     metFORMIN 500 MG tablet  Commonly known as: GLUCOPHAGE  Take 500 mg by mouth 2 (two) times daily with meals.     potassium chloride SA 20 MEQ tablet  Commonly known as: K-DUR,KLOR-CON  Take 20 mEq by mouth once daily.        STOP taking these medications    furosemide 40 MG tablet  Commonly known as: LASIX     hydroCHLOROthiazide 25 MG tablet  Commonly known as: HYDRODIURIL            Indwelling Lines/Drains at time of discharge:   Lines/Drains/Airways     None                 Time spent on the discharge of patient: 32 minutes         Henri De La Torre MD  Department of Hospital Medicine  [facility] - Tele[facility] ([facility])

## 2022-01-12 NOTE — PLAN OF CARE
[facility]  Physical Therapy Initial Evaluation    Name: Javier Barrera  Clinic Number: [id number]    Therapy Diagnosis:   Encounter Diagnoses   Name Primary?    Cerebrovascular accident (CVA), unspecified mechanism     Impaired functional mobility and activity tolerance      Physician: Henri Devine*    Physician Orders: PT Eval and Treat  Medical Diagnosis from Referral: I63.9 (ICD-10-CM) - Cerebrovascular accident (CVA), unspecified mechanism  Evaluation Date: 1/12/2022  Authorization Period Expiration: 1/8/2023  Plan of Care Expiration: 3/8/2022  Visit # / Visits authorized: 1/ 1    Time In: 1100  Time Out: 1200  Total Billable Time: 60 minutes    Precautions: Standard, HTN, [place] Diabetic, Allergic to Penicillin, Hx of CVA, Hx of recent COVID-19, visual deficits    Subjective     Date of onset: approx 2 months ago  History of current condition - Javier reports: started to notice vision problems a few months ago, symptoms were coming and going. Symptoms progressively worsened throughout this time. He scheduled an appointment with ophthalmologist and had MRI scheduled. After MRI was performed, pt was contacted and told to see PCP or ED immediately. Pt states he spent about 4 days in the hospital before returning home. Pt lives at home with his son who is able to help with some activities around the house. 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 Pt is still driving at the moment but is only going short distances during the day.     No past medical history on file.  Javier Barrera  has no past surgical history on file.    Javier has a current medication list which includes the following prescription(s): amlodipine, aspirin, clopidogrel, ezetimibe, gabapentin, hydrocodone-acetaminophen, lisinopril, metformin, and potassium chloride sa.    Review of patient's allergies indicates:    Allergen Reactions    Penicillins Swelling    Atorvastatin Other (See Comments)        Imaging, MRI studies: Brain    Impression:     Multiple small areas of recent infarction (acute or early subacute) involving the left cerebellar hemisphere and right occipital lobe as above.  No significant mass effect or associated hemorrhage.     Consider further evaluation with dedicated vascular imaging (i.e.  CTA head and neck).     Modest chronic microvascular ischemic change and small remote left frontal cortical infarct.     Small defect in the frontal lobe (middle frontal gyrus) as above, possibly remote infarct versus developmental abnormality.     This report was flagged in Epic as abnormal.     Electronically signed by resident: Dione Angel  Date:                                            01/05/2022  Time:                                           07:59     Electronically signed by: Fahad Tolliver MD  Date:                                            01/05/2022  Time:                                           15:11    Prior Therapy: yes  Social History: lives with their son  Occupation: AC worker  Prior Level of Function: ind  Current Level of Function: ind, having difficuly with vision    Pain:  Current 8/10, worst 9/10, best 5/10   Location: left side of neck  Description: Burning and Sharp  Aggravating Factors: Bending, Extension and Flexing  Easing Factors: medication     Pts goals: improve independence, mobility     Objective     CMS Impairment/Limitation/Restriction for FOTO Stroke Survey    Therapist reviewed FOTO scores for Javier Barrera on 1/12/2022.   FOTO documents entered into EPIC - see Media section.    Limitation Score: 41%       Mental status: alert    Posture/ Alignment: Protruded Head, Guarding  Postural examination/scapula alignment: Rounded shoulder    Sensation:   Light Touch UEs  Intact  Light Touch LEs  Intact  Proprioception: Intact,     Dermatomes:   Right Left Comment   L2  intact intact    L3 intact intact    L4 intact intact    L5 intact intact    S1 intact intact    S2 intact intact    Saddle intact intact      Myotomes:   Right Left Comment   Hip flexion (L2-3): 4/5 4/5    Knee extension (L3-4): 5/5 5/5    DF (L4-5): 5/5 5/5    Great Toe Ext (L5-S1): 5/5 5/5    Great Toe Flex (S1-S2): 5/5 5/5      DTR:   Right Left Comment   Patellar (L3-4) 2+ 2+    Achilles (S1) 2+ 2+        Active/Passive ROM (measured in degrees)     UE ROM WNL    LE ROM WNL    Lumbar % Comment   Flexion: 90     Extension: 100     Lat Flex R: 75    Lat Flex L: 75     Rotation R: 100    Rotation L: 100      Upper Extremity Strength  (R) UE  (L) UE    Shoulder Flexion: 5/5 Shoulder Flexion: 4+/5   Shoulder Abduction: 5/5 Shoulder Abduction: 4+/5   Shoulder Extension: 5/5 Shoulder Extension:   4+/5   Elbow Flexion: 5/5 Elbow Flexion: 5/5   Elbow Extension: 5/5 Elbow Extension: 5/5   Wrist Flexion: 5/5 Wrist Flexion: 5/5   Wrist Extension: 5/5 Wrist Extension: 5/5     Lower Extremity Strength  Right LE  Left LE    Hip Flexion: 4/5 Hip Flexion: 4/5   Hip Extension:  4+/5 Hip Extension: 4+/5   Hip Abduction: 4+/5 Hip Abduction: 4+/5   Hip Adduction: 4+/5 Hip Adduction 4+/5   Knee Extension: 5/5 Knee Extension: 5/5   Knee Flexion: 5/5 Knee Flexion: 5/5   Ankle Dorsiflexion: 5/5 Ankle Dorsiflexion: 5/5   Ankle Plantarflexion: 5/5 Ankle Plantarflexion: 5/5     Abdominal Strength: fair, good    Special Tests      Strength (in pounds, setting II one maximum trial):   Left Right    II WNL WNL     Normal  Average Values  Female Right Left Male: Right Left   20-29 66 lbs 62 lbs         94 lbs 86 lbs   30-39 68 lbs 64 lbs 90 lbs 82 lbs   40-49 64 lbs 62 lbs 80 lbs 74 lbs   50-59 62 lbs 57 lbs 72 lbs 65 lbs   60-69 53 lbs 51 lbs 63 lbs 56 lbs   70+ 44 lbs 42 lbs 54 lbs 48 lbs     Balance Assessment:   Evaluation   Single Limb Stance R LE 5-10 sec  (<10 sec = HIGH FALL RISK)   Single Limb Stance L LE 5-10 sec  (<10 sec =  HIGH FALL RISK)      Evaluation   30 second Chair Rise  (adults > 61 y/o) 18 completed with no arms   5 times sit-stand  (adults 18-63 y/o) 6 seconds  >12 sec= fall risk for general elderly  >16 sec= fall risk for Parkinson's disease  >10 sec= balance/vestibular dysfunction (<61 y/o)  >14.2 sec= balance/vestibular dysfunction (>61 y/o)  >12 sec= fall risk for CVA       GAIT DEVIATIONS: Javier amb with decreased lamin.    Endurance Assessment:   Evaluation   Timed Up and Go 6.8 sec  < 20 sec safe for independent transfers, < 30 sec safe for dependent transfers/assist required     Table: Population Norms for TUG    Age  Average TUG    60 - 69 years  8.1 seconds    70 - 79 years  9.2 seconds    80 - 99 years  11.3 seconds      Palpation:  Increased TTP and ROS left upper trap, cervical paraspinals    Pt/family was provided educational information, including: role of PT, goals for PT, scheduling - pt verbalized understanding. Discussed insurance plan with pt.       TREATMENT     Treatment Time In: 1150  Treatment Time Out: 1200  Total Treatment time separate from Evaluation: 10 minutes    Javier received therapeutic exercises to develop strength, endurance, ROM, flexibility, posture and core stabilization for 10 minutes including:    HEP review and patient education, safety awareness  (No HEP printout given today, unable to access website)      Javire received the following manual therapy techniques: Joint mobilizations, Manual traction and Soft tissue Mobilization were applied to the: cervical for 00 minutes, including:      Javier participated in neuromuscular re-education activities to improve: Balance, Coordination, Proprioception and Posture for 00 minutes. The following activities were included:      Javier participated in dynamic functional therapeutic activities to improve functional performance for 00  minutes, including:      Javier participated in gait training to improve functional mobility and safety for 00  minutes,  including:      Home Exercises and Patient Education Provided    Education provided:   - HEP provided and reviewed  - Anatomy and Physiology pertaining to current condition  - Possible response to exercise  - Importance of PT compliance    Written Home Exercises Provided: yes.  Exercises were reviewed and Javier was able to demonstrate them prior to the end of the session.  Javier demonstrated good  understanding of the education provided.     See EMR under Patient Instructions for exercises provided 1/12/2022.    Assessment     Javier is a 66 y.o. male referred to outpatient Physical Therapy with a medical diagnosis of I63.9 (ICD-10-CM) - Cerebrovascular accident (CVA), unspecified mechanism. Upon assessment, pt demonstrates weakness in LUE, impaired posture, decreased gait speed, slowed transition movements due to visual deficits.  These impairments are limiting patient's ability to carry out daily activities at home, recreational activities, and work duties. PT will focus on maintaining functional mobility, increasing balance/LE strength, improving visual strategies, education/safety awareness, and ADL tolerance. At this time he would benefit from the continuance of skilled PT. Pt told to follow up with MD or PCP about ability to drive with visual deficits.     Pt prognosis is Good.   Pt will benefit from skilled outpatient Physical Therapy to address the deficits stated above and in the chart below, provide pt/family education, and to maximize pt's level of independence.     Plan of care discussed with patient: Yes  Pt's spiritual, cultural and educational needs considered and patient is agreeable to the plan of care and goals as stated below:     Anticipated Barriers for therapy: recent CVA    Medical Necessity is demonstrated by the following  History  Co-morbidities and personal factors that may impact the plan of care Co-morbidities:   HTN, Hx of acute CVA and COVID-19    Personal Factors:   no deficits      moderate   Examination  Body Structures and Functions, activity limitations and participation restrictions that may impact the plan of care Body Regions:   neck  lower extremities  upper extremities    Body Systems:    gross symmetry  ROM  strength  gross coordinated movement  balance  gait  transfers  transitions    Participation Restrictions:   ADLs, recreational    Activity limitations:   Learning and applying knowledge  no deficits    General Tasks and Commands  no deficits    Communication  no deficits    Mobility  lifting and carrying objects  walking  moving around using equipment (WC)  using transportation (bus, train, plane, car)  driving (bike, car, motorcycle)    Self care  washing oneself (bathing, drying, washing hands)  caring for body parts (brushing teeth, shaving, grooming)  looking after one's health    Domestic Life  shopping  cooking  doing house work (cleaning house, washing dishes, laundry)  assisting others    Interactions/Relationships  family relationships    Life Areas  employment    Community and Social Life  community life  recreation and leisure         moderate   Clinical Presentation evolving clinical presentation with changing clinical characteristics moderate   Decision Making/ Complexity Score: moderate     Goals:  Short Term Goals: 4 weeks   1. Pt will be independent with HEP to facilitate healing and improve outcome measures  2. Pt will increase gross LE/UE strength by at least a half grade.  3. Pt will demonstrate improved postural awareness to assist with balance and reduced pain levels in neck.  4. Pt will improve FOTO score by 5% or greater.    Long Term Goals: 8 weeks   1. Pt will demonstrate proper safety awareness with ambulation, transfers, and ADLs.  2. Pt will demonstrate strength 5/5 in BLE, BUE.  3. Pt will demonstrate sufficient balance in single leg stance, tandem stance, compliant surface, and without visual dependency.     Plan     Plan of care Certification:  1/12/2022 to 3/8/2022.    Outpatient Physical Therapy 2 times weekly for 8 weeks to include the following interventions: Cervical/Lumbar Traction, Electrical Stimulation IFC/TENS, Gait Training, Manual Therapy, Moist Heat/ Ice, Neuromuscular Re-ed, Patient Education, Therapeutic Activities, Therapeutic Exercise and Dry needling.     Gato Falk, PT DPT

## 2022-01-13 ENCOUNTER — PATIENT OUTREACH (OUTPATIENT)
Dept: ADMINISTRATIVE | Facility: HOSPITAL | Age: 67
End: 2022-01-13
Payer: MEDICARE

## 2022-01-13 NOTE — PROGRESS NOTES
Called patient to confirm hospital follow up scheduled on 01/14/2022. Unable to confirm patient did not answer, LVM.

## 2022-01-14 ENCOUNTER — OFFICE VISIT (OUTPATIENT)
Dept: FAMILY MEDICINE | Facility: CLINIC | Age: 67
End: 2022-01-14
Payer: MEDICARE

## 2022-01-14 ENCOUNTER — TELEPHONE (OUTPATIENT)
Dept: FAMILY MEDICINE | Facility: CLINIC | Age: 67
End: 2022-01-14

## 2022-01-14 VITALS
HEART RATE: 74 BPM | SYSTOLIC BLOOD PRESSURE: 92 MMHG | HEIGHT: 66 IN | BODY MASS INDEX: 33.27 KG/M2 | WEIGHT: 207 LBS | DIASTOLIC BLOOD PRESSURE: 58 MMHG | TEMPERATURE: 98 F

## 2022-01-14 DIAGNOSIS — R20.2 NUMBNESS AND TINGLING OF LEFT LEG: ICD-10-CM

## 2022-01-14 DIAGNOSIS — M54.50 LUMBOSACRAL PAIN: ICD-10-CM

## 2022-01-14 DIAGNOSIS — M54.12 CERVICAL RADICULOPATHY: ICD-10-CM

## 2022-01-14 DIAGNOSIS — R73.03 PREDIABETES: ICD-10-CM

## 2022-01-14 DIAGNOSIS — R20.0 NUMBNESS AND TINGLING OF LEFT LEG: ICD-10-CM

## 2022-01-14 DIAGNOSIS — Z12.11 COLON CANCER SCREENING: Primary | ICD-10-CM

## 2022-01-14 DIAGNOSIS — I63.9 ACUTE CVA (CEREBROVASCULAR ACCIDENT): ICD-10-CM

## 2022-01-14 DIAGNOSIS — R20.0 NUMBNESS AND TINGLING IN LEFT ARM: ICD-10-CM

## 2022-01-14 DIAGNOSIS — I10 PRIMARY HYPERTENSION: ICD-10-CM

## 2022-01-14 DIAGNOSIS — Z13.6 ENCOUNTER FOR ABDOMINAL AORTIC ANEURYSM (AAA) SCREENING: ICD-10-CM

## 2022-01-14 DIAGNOSIS — E78.2 MIXED HYPERLIPIDEMIA: ICD-10-CM

## 2022-01-14 DIAGNOSIS — H54.3 IMPAIRED VISION IN BOTH EYES: ICD-10-CM

## 2022-01-14 DIAGNOSIS — Z79.899 MEDICATION MANAGEMENT: ICD-10-CM

## 2022-01-14 DIAGNOSIS — R20.2 NUMBNESS AND TINGLING IN LEFT ARM: ICD-10-CM

## 2022-01-14 DIAGNOSIS — M79.18 MYOFASCIAL PAIN ON LEFT SIDE: ICD-10-CM

## 2022-01-14 PROCEDURE — 99999 PR PBB SHADOW E&M-EST. PATIENT-LVL V: ICD-10-PCS | Mod: PBBFAC,,, | Performed by: STUDENT IN AN ORGANIZED HEALTH CARE EDUCATION/TRAINING PROGRAM

## 2022-01-14 PROCEDURE — 99204 OFFICE O/P NEW MOD 45 MIN: CPT | Mod: S$PBB,,, | Performed by: STUDENT IN AN ORGANIZED HEALTH CARE EDUCATION/TRAINING PROGRAM

## 2022-01-14 PROCEDURE — 99999 PR PBB SHADOW E&M-EST. PATIENT-LVL V: CPT | Mod: PBBFAC,,, | Performed by: STUDENT IN AN ORGANIZED HEALTH CARE EDUCATION/TRAINING PROGRAM

## 2022-01-14 PROCEDURE — 99204 PR OFFICE/OUTPT VISIT, NEW, LEVL IV, 45-59 MIN: ICD-10-PCS | Mod: S$PBB,,, | Performed by: STUDENT IN AN ORGANIZED HEALTH CARE EDUCATION/TRAINING PROGRAM

## 2022-01-14 PROCEDURE — 99215 OFFICE O/P EST HI 40 MIN: CPT | Mod: PBBFAC,PO | Performed by: STUDENT IN AN ORGANIZED HEALTH CARE EDUCATION/TRAINING PROGRAM

## 2022-01-14 RX ORDER — GABAPENTIN 300 MG/1
300 CAPSULE ORAL NIGHTLY
Qty: 30 CAPSULE | Refills: 1 | Status: SHIPPED | OUTPATIENT
Start: 2022-01-14 | End: 2022-01-14

## 2022-01-14 RX ORDER — GABAPENTIN 300 MG/1
600 CAPSULE ORAL NIGHTLY
Qty: 30 CAPSULE | Refills: 1 | Status: SHIPPED | OUTPATIENT
Start: 2022-01-14 | End: 2022-01-26

## 2022-01-14 NOTE — TELEPHONE ENCOUNTER
----- Message from Gina Genao sent at 1/14/2022 10:37 AM CST -----  Contact: Javier  Patient is calling back after today's office visit. Explains needing to review current list of medications that were left at home today. Also that there was a concern to discontinue one prescription yet patient is having trouble with patients eyesight. Please give patient a call back at [phone] as requested.   Thanks,  RP

## 2022-01-14 NOTE — PROGRESS NOTES
Problem List Items Addressed This Visit        Neuro    Acute CVA (cerebrovascular accident)    Overview     Jan 2022 bilateral visual disturbance-  acute CVA involving the left cerebellar hemisphere and right occipital lobe. CTA Head and neck  show Small, nonocclusive arterial thrombus within the left subclavian artery just proximal to the origin of the left vertebral artery.  - will need JAVAN and/or Loop recorder as out patient once pt is COVID free, referral to cards placed    - cont DAPT and Zetia (intolerant to statin)   - Follows with Neuro-Ophthalmology  - home PT ordered         Relevant Orders    Ambulatory referral/consult to Cardiology       Cardiac/Vascular    Mixed hyperlipidemia    Overview     Intolerant of statins; states he is not interested in trying other statin options    -chronic condition. Currently stable.    -patient reports compliance with hyperlipidemia treatment as prescribed    Hyperlipidemia Medications             ezetimibe (ZETIA) 10 mg tablet Take 1 tablet (10 mg total) by mouth every evening.        -denies side effects of medications, including any myalgias, CP, SOB, abdominal pain, N/V, C/D at this time  -recent labs listed below:    Lab Results   Component Value Date    CHOL 151 01/05/2022     Lab Results   Component Value Date    HDL 26 (L) 01/05/2022     Lab Results   Component Value Date    LDLCALC 90.6 01/05/2022     Lab Results   Component Value Date    TRIG 172 (H) 01/05/2022                 Endocrine    Prediabetes    Overview     Well controlled; prediabetes  Lab Results   Component Value Date    HGBA1C 6.1 (H) 01/05/2022     -current meds:   Diabetes Medications             metFORMIN (GLUCOPHAGE) 500 MG tablet Take 500 mg by mouth 2 (two) times daily with meals.        -on statin: intolerant  -on ACE-I/ARB: yes  -counseling provided on importance of diabetic diet and medication compliance in order to treat diabetes  -discussed diabetes disease course and potential  "complications  Follow up 3 months              Other Visit Diagnoses     Colon cancer screening    -  Primary    Relevant Orders    Case Request Endoscopy: COLONOSCOPY (Completed)    Encounter for abdominal aortic aneurysm (AAA) screening        Relevant Orders    US Abdominal Aorta    Myofascial pain on left side        Relevant Medications    gabapentin (NEURONTIN) 300 MG capsule    Numbness and tingling of left leg        Relevant Medications    gabapentin (NEURONTIN) 300 MG capsule    Numbness and tingling in left arm        Relevant Medications    gabapentin (NEURONTIN) 300 MG capsule    Cervical radiculopathy        Relevant Medications    gabapentin (NEURONTIN) 300 MG capsule    Lumbosacral pain        Relevant Medications    gabapentin (NEURONTIN) 300 MG capsule    Primary hypertension        Relevant Orders    MyChart Patient Entered Blood Pressure    Ambulatory referral/consult to Home Health    Impaired vision in both eyes        Relevant Orders    Ambulatory referral/consult to Home Health    Ambulatory referral/consult to Ophthalmology    Medication management        Relevant Orders    Ambulatory referral/consult to Home Health            Patient ID: Javier Barrera is a 66 y.o. male.    Chief Complaint:  establish care    Previous PCP: Gurmeet [facility]     Patient is here to establish care. Has a hx of  has a past medical history of Diabetes mellitus, type 2, Hyperlipidemia, Hypertension, and Stroke.   Recently with CVA that is affecting his optic nerves. Follows with Dr. Husain eye/neurologist   MRI to assess bilateral papilledema- admitted to [facility] for CVA - reports vision is blurry (can see out bottom half of R eye, top half of L eye    Cervical radiculopathy     "Hospital Course:   Pt with H/O HTN, DM, cervical radiculopathy, carpal tunnel syndrome bilaterally, former  Smoker admitted for further evaluation and treatment of acute CVA involving the left cerebellar hemisphere and right occipital lobe. " "Pt with c/o 2 -3 mos h/o visual disturbance and intermittent dizziness. 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 Of note, pt tested positive for COVID-19  on 12/31/21.      1/6/22- Pt started on ASA, Plavix and statin . Neurology consult obtained and suggested JAVAN and CTA head/Neck. Cardiology suggested to continue dual antiplatelet and statin and JAVAN and/or Loop recorder as out patient once pt is COVID free. AC not initiated at this time. CTA head and neck will be done tomorrow once kidney function is favorable. ST cleared pt for regular diet. PT/OT suggested out patient treatment. Gentle hydration continued for SANTA.     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 Case D/W neurology which recommend  cont DAPT .      1/8 Pt was seen and examined at bedside . He was determined to be suitable for d/c   Case D/W Neurology which rec DAPT x 3 months    F/U neurovascular team at [facility]  Outp PT/OT"           Colonoscopy: 2000- 1 polyp, hemorrhoids     Health Maintenance Topics with due status: Not Due       Topic Last Completion Date    TETANUS VACCINE 02/16/2020    Lipid Panel 01/05/2022        ==============================================  History reviewed.   Health Maintenance Due   Topic Date Due    Colorectal Cancer Screening  Never done    Abdominal Aortic Aneurysm Screening  Never done       Past Medical History:  Past Medical History:   Diagnosis Date    Diabetes mellitus, type 2     Hyperlipidemia     Hypertension     Stroke      Past Surgical History:   Procedure Laterality Date    HEMORRHOID SURGERY       Review of patient's allergies indicates:   Allergen Reactions    Penicillins Swelling    Atorvastatin Other (See Comments)     Current Outpatient " Medications on File Prior to Visit   Medication Sig Dispense Refill    aspirin (ECOTRIN) 81 MG EC tablet Take 1 tablet (81 mg total) by mouth once daily. 90 tablet 3    clopidogreL (PLAVIX) 75 mg tablet Take 1 tablet (75 mg total) by mouth once daily. 90 tablet 0    ezetimibe (ZETIA) 10 mg tablet Take 1 tablet (10 mg total) by mouth every evening. 30 tablet 3    HYDROcodone-acetaminophen (NORCO) 5-325 mg per tablet Take 1 tablet by mouth every 6 (six) hours as needed for Pain. 15 tablet 0    metFORMIN (GLUCOPHAGE) 500 MG tablet Take 500 mg by mouth 2 (two) times daily with meals.      lisinopriL (PRINIVIL,ZESTRIL) 20 MG tablet Take 20 mg by mouth once daily.      potassium chloride SA (K-DUR,KLOR-CON) 20 MEQ tablet Take 20 mEq by mouth once daily.       No current facility-administered medications on file prior to visit.     Social History     Socioeconomic History    Marital status:    Tobacco Use    Smoking status: Former Smoker     Quit date: 2001     Years since quittin.0    Smokeless tobacco: Never Used   Substance and Sexual Activity    Alcohol use: Not Currently    Drug use: Never    Sexual activity: Not Currently     History reviewed. No pertinent family history.       Review of Systems   12 point review of systems per hpi, otherwise negative         Objective:    Nursing note and vitals reviewed.  Vitals:    22 0748   BP: (!) 92/58   Pulse: 74   Temp: 97.9 °F (36.6 °C)     Body mass index is 33.41 kg/m².     Physical Exam   Constitutional:oriented to person, place, and time. appears well-developed and well-nourished. No distress.   HENT: WNL  Head: Normocephalic and atraumatic.   Eyes: Pupils are equal, round, and reactive to light. EOM are normal.   Neck: Normal range of motion. Neck supple.   Cardiovascular: Normal rate, regular rhythm, normal heart sounds and intact distal pulses.   No murmur heard.  Pulmonary/Chest: Effort normal and breath sounds normal. No  respiratory distress. no wheezes.   Musculoskeletal: Normal range of motion. no edema.   Neurological: alert and oriented to person, place, and time. No cranial nerve deficit.   Skin: Skin is warm and dry. Capillary refill takes less than 2 seconds.   Psychiatric: normal mood and affect. behavior is normal.       Transitional Care Note    Family and/or Caretaker present at visit?  No.  Diagnostic tests reviewed/disposition: I have reviewed all completed as well as pending diagnostic tests at the time of discharge.  Disease/illness education: CVA  Home health/community services discussion/referrals: Patient does not have home health established from hospital visit.  They do need home health.  If needed, we will set up home health for the patient.   Establishment or re-establishment of referral orders for community resources: No other necessary community resources.   Discussion with other health care providers: No discussion with other health care providers necessary.           Sandy Sanches MD    We Offer Telehealth & Same Day Appointments!   Book your Telehealth appointment with me through my nurse or   Clinic appointments on [employer]!  [phone]     To Schedule appointments online, go to [employer]: [email or web address]

## 2022-01-18 ENCOUNTER — CLINICAL SUPPORT (OUTPATIENT)
Dept: REHABILITATION | Facility: HOSPITAL | Age: 67
End: 2022-01-18
Payer: MEDICARE

## 2022-01-18 DIAGNOSIS — Z74.09 IMPAIRED FUNCTIONAL MOBILITY AND ACTIVITY TOLERANCE: ICD-10-CM

## 2022-01-18 PROCEDURE — 97110 THERAPEUTIC EXERCISES: CPT | Mod: PN

## 2022-01-18 PROCEDURE — 97112 NEUROMUSCULAR REEDUCATION: CPT | Mod: PN

## 2022-01-18 NOTE — PROGRESS NOTES
Physical Therapy Daily Treatment Note     Name: Javier Barrera  Clinic Number: [id number]    Therapy Diagnosis:   Encounter Diagnosis   Name Primary?    Impaired functional mobility and activity tolerance      Physician: Henri Devine*    Visit Date: 1/18/2022  Physician Orders: PT Eval and Treat  Medical Diagnosis from Referral: I63.9 (ICD-10-CM) - Cerebrovascular accident (CVA), unspecified mechanism  Evaluation Date: 1/12/2022  Authorization Period Expiration: 12/31/2022  Plan of Care Expiration: 3/8/2022  Visit # / Visits authorized: 1/20 auth (1/1 Eval)     Time In: 1020  Time Out: 1100  Total Billable Time: 40 minutes     Precautions: Standard, HTN, [place] Diabetic, Allergic to Penicillin, Hx of CVA, Hx of recent COVID-19, visual deficits    Subjective     Pt reports: improvements in mobility, vision, and balance since he was last seen. No new complaints at this time.     He was compliant with home exercise program.  Response to previous treatment: initial evaluation  Functional change: improved overall mobility    Pain: 5/10  Location: Left upper trap    Objective     Javier received therapeutic exercises to develop strength, endurance, ROM, flexibility, posture and core stabilization for 10 minutes including:     Recumbent bike 5 min lvl 5  Treadmill 2.0 3 min no incline    Possible next visit: balance beam, rebounder        Javier participated in neuromuscular re-education activities to improve: Balance, Coordination, Proprioception and Posture for 30 minutes. The following activities were included:     (all performed in // bars)  Tandem walking 5 sets  Retro gait 5 sets  Side stepping, eyes closed 5 sets  Single leg stance 4x20 sec each leg  Airex foam, both feet, eyes closed, no UE support 4x30 sec  Step ups, 8 in, no UE support x15 each leg  Chair squats no UE support x30 reps        Home Exercises and Patient Education Provided     Education provided:   - HEP provided and reviewed  -  Anatomy and Physiology pertaining to current condition  - Possible response to exercise  - Importance of PT compliance     Written Home Exercises Provided: yes.  Exercises were reviewed and Javier was able to demonstrate them prior to the end of the session.  Javier demonstrated good  understanding of the education provided.      See EMR under Patient Instructions for exercises provided 1/12/2022.    Assessment     Javier demonstrates improved functional mobility and endurance today compared to his previous visit. He was able to tolerate all therex and balance activities today without any LOB or difficulty seeing. His vision in the right eye has improved in recent days which has allowed for increased activities. Plan to progress toward PT goals and continue with PT POC. Possibly reduce frequency if he maintains this level of independence and functional mobility.     Javier Is progressing well towards his goals.   Pt prognosis is Good.     Pt will continue to benefit from skilled outpatient physical therapy to address the deficits listed in the problem list box on initial evaluation, provide pt/family education and to maximize pt's level of independence in the home and community environment.     Pt's spiritual, cultural and educational needs considered and pt agreeable to plan of care and goals.    Anticipated barriers to physical therapy: recent CVA, visual deficits    Goals:  Short Term Goals: 4 weeks   1. Pt will be independent with HEP to facilitate healing and improve outcome measures. (progressing, not met)  2. Pt will increase gross LE/UE strength by at least a half grade. (progressing, not met)  3. Pt will demonstrate improved postural awareness to assist with balance and reduced pain levels in neck. (progressing, not met)  4. Pt will improve FOTO score by 5% or greater. (progressing, not met)     Long Term Goals: 8 weeks   1. Pt will demonstrate proper safety awareness with ambulation, transfers, and ADLs.  (progressing, not met)  2. Pt will demonstrate strength 5/5 in BLE, BUE. (progressing, not met)  3. Pt will demonstrate sufficient balance in single leg stance, tandem stance, compliant surface, and without visual dependency. (progressing, not met)    Plan     Continue PT to progress functional mobility and address limitations.     Gato Falk, PT DPT

## 2022-01-19 ENCOUNTER — TELEPHONE (OUTPATIENT)
Dept: FAMILY MEDICINE | Facility: CLINIC | Age: 67
End: 2022-01-19
Payer: MEDICARE

## 2022-01-19 NOTE — TELEPHONE ENCOUNTER
----- Message from Jen Nation sent at 1/19/2022  8:10 AM CST -----  Contact: Yasmani([facility])  Yasmani called to consult with nurse or staff regarding the patient. He states he is is supposed to do home health start up with the patient today and was wanting to call in as a courtesy. He states the patient is declining the start up and wants to go with another company. He also states that the patient is going with another company that will help with cleaning around his house. In needing to reach Yasmani he can be reached at [phone]. Thanks/MR

## 2022-01-19 NOTE — TELEPHONE ENCOUNTER
----- Message from Laura Jack sent at 1/19/2022 12:18 PM CST -----  Contact: Self [phone]  Patient is returning a phone call.    Who left a message for the patient: nurse     Does patient know what this is regarding:  medication?     Would you like a call back, or a response through your [facility] portal?:  call back    Comments:

## 2022-01-19 NOTE — TELEPHONE ENCOUNTER
Lm for rangel to see if he had any other information on the  company the pt is requesting, I was unable to contact the pt to find out from him

## 2022-01-19 NOTE — TELEPHONE ENCOUNTER
----- Message from Tanya Mitchell sent at 1/19/2022  8:36 AM CST -----  Please call Jairon/Eagan [facility] @ [phone] regarding pt admit, states pt would graciela to go with another home health name [facility]

## 2022-01-21 ENCOUNTER — OFFICE VISIT (OUTPATIENT)
Dept: CARDIOLOGY | Facility: CLINIC | Age: 67
End: 2022-01-21
Payer: MEDICARE

## 2022-01-21 ENCOUNTER — OFFICE VISIT (OUTPATIENT)
Dept: FAMILY MEDICINE | Facility: CLINIC | Age: 67
End: 2022-01-21
Payer: MEDICARE

## 2022-01-21 VITALS
TEMPERATURE: 98 F | HEIGHT: 66 IN | DIASTOLIC BLOOD PRESSURE: 85 MMHG | HEART RATE: 74 BPM | WEIGHT: 211.88 LBS | SYSTOLIC BLOOD PRESSURE: 140 MMHG | BODY MASS INDEX: 34.05 KG/M2

## 2022-01-21 VITALS
SYSTOLIC BLOOD PRESSURE: 124 MMHG | HEIGHT: 66 IN | WEIGHT: 212 LBS | DIASTOLIC BLOOD PRESSURE: 82 MMHG | OXYGEN SATURATION: 98 % | BODY MASS INDEX: 34.07 KG/M2 | HEART RATE: 75 BPM

## 2022-01-21 DIAGNOSIS — I63.212 CEREBRAL INFARCTION DUE TO UNSPECIFIED OCCLUSION OR STENOSIS OF LEFT VERTEBRAL ARTERY: ICD-10-CM

## 2022-01-21 DIAGNOSIS — S39.012A BACK STRAIN, INITIAL ENCOUNTER: Primary | ICD-10-CM

## 2022-01-21 DIAGNOSIS — Z01.818 PREOP TESTING: Primary | ICD-10-CM

## 2022-01-21 DIAGNOSIS — M54.32 LEFT SIDED SCIATICA: ICD-10-CM

## 2022-01-21 DIAGNOSIS — E78.2 MIXED HYPERLIPIDEMIA: ICD-10-CM

## 2022-01-21 DIAGNOSIS — R73.03 PREDIABETES: ICD-10-CM

## 2022-01-21 DIAGNOSIS — Z09 HOSPITAL DISCHARGE FOLLOW-UP: Primary | ICD-10-CM

## 2022-01-21 DIAGNOSIS — I63.012 CEREBRAL INFARCTION DUE TO THROMBOSIS OF LEFT VERTEBRAL ARTERY: ICD-10-CM

## 2022-01-21 DIAGNOSIS — I63.9 CEREBROVASCULAR ACCIDENT (CVA), UNSPECIFIED MECHANISM: ICD-10-CM

## 2022-01-21 PROCEDURE — 99999 PR PBB SHADOW E&M-EST. PATIENT-LVL IV: CPT | Mod: PBBFAC,,, | Performed by: NURSE PRACTITIONER

## 2022-01-21 PROCEDURE — 99999 PR PBB SHADOW E&M-EST. PATIENT-LVL IV: ICD-10-PCS | Mod: PBBFAC,,, | Performed by: NURSE PRACTITIONER

## 2022-01-21 PROCEDURE — 99999 PR PBB SHADOW E&M-EST. PATIENT-LVL III: ICD-10-PCS | Mod: PBBFAC,,, | Performed by: NURSE PRACTITIONER

## 2022-01-21 PROCEDURE — 96372 THER/PROPH/DIAG INJ SC/IM: CPT | Mod: PBBFAC,PO

## 2022-01-21 PROCEDURE — 99999 PR PBB SHADOW E&M-EST. PATIENT-LVL III: CPT | Mod: PBBFAC,,, | Performed by: NURSE PRACTITIONER

## 2022-01-21 PROCEDURE — 99214 OFFICE O/P EST MOD 30 MIN: CPT | Mod: S$PBB,,, | Performed by: NURSE PRACTITIONER

## 2022-01-21 PROCEDURE — 99214 OFFICE O/P EST MOD 30 MIN: CPT | Mod: 25,PBBFAC,PO | Performed by: NURSE PRACTITIONER

## 2022-01-21 PROCEDURE — 99213 OFFICE O/P EST LOW 20 MIN: CPT | Mod: S$PBB,,, | Performed by: NURSE PRACTITIONER

## 2022-01-21 PROCEDURE — 99213 OFFICE O/P EST LOW 20 MIN: CPT | Mod: PBBFAC,27,PO | Performed by: NURSE PRACTITIONER

## 2022-01-21 PROCEDURE — 99214 PR OFFICE/OUTPT VISIT, EST, LEVL IV, 30-39 MIN: ICD-10-PCS | Mod: S$PBB,,, | Performed by: NURSE PRACTITIONER

## 2022-01-21 PROCEDURE — 99213 PR OFFICE/OUTPT VISIT, EST, LEVL III, 20-29 MIN: ICD-10-PCS | Mod: S$PBB,,, | Performed by: NURSE PRACTITIONER

## 2022-01-21 RX ORDER — HYDROCODONE BITARTRATE AND ACETAMINOPHEN 5; 325 MG/1; MG/1
1 TABLET ORAL EVERY 6 HOURS PRN
Qty: 20 TABLET | Refills: 0 | Status: SHIPPED | OUTPATIENT
Start: 2022-01-21 | End: 2023-04-13

## 2022-01-21 RX ORDER — CYCLOBENZAPRINE HCL 10 MG
10 TABLET ORAL 3 TIMES DAILY PRN
Qty: 30 TABLET | Refills: 0 | Status: SHIPPED | OUTPATIENT
Start: 2022-01-21 | End: 2022-01-31

## 2022-01-21 RX ORDER — DEXAMETHASONE SODIUM PHOSPHATE 4 MG/ML
8 INJECTION, SOLUTION INTRA-ARTICULAR; INTRALESIONAL; INTRAMUSCULAR; INTRAVENOUS; SOFT TISSUE ONCE
Status: COMPLETED | OUTPATIENT
Start: 2022-01-21 | End: 2022-01-21

## 2022-01-21 RX ADMIN — DEXAMETHASONE SODIUM PHOSPHATE 8 MG: 4 INJECTION INTRA-ARTICULAR; INTRALESIONAL; INTRAMUSCULAR; INTRAVENOUS; SOFT TISSUE at 11:01

## 2022-01-21 NOTE — PROGRESS NOTES
Subjective:    Patient ID:  Javier Barrera is a 66 y.o. male who presents for evaluation of Pre-op Exam      HPI: Mr. Javier Barrera presents to the clinic for preop cardiovascular exam and hospital discharge follow-up.  He has a history hypertension, hyperlipidemia, prediabetes, chronic neck and back pain.  he stated that he is doing pretty well; his major complaint is back pain which is chronic.    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 After MRI was done, he was sent to ER for CVA workup.  He was admitted to [facility] on January 5, 2022 with actue right occipital and left cerebellar strokes with visual disturbance and intermittent dizziness. He was evaluated by Neuro-ophthamology and found to have ischemic optic neuropathy bilaterally from eye exam.  MRI showed subacute right occipital and left cerebellar strokes. He also tested positive for COVID on 12/31/21. He was started on ASA,Plavix, and statin and changed to Zetia due to statin intolerance. CTA head and neck show small, nonocclusive arterial thrombus within the left subclavian artery just proximal to the origin of the left vertebral artery. DATP was recommended for 3 months with F/U with neurovascular team in [place] and recommended outpatient PT/OT.  Cardiology was consulted for JAVAN to evaluate potential for cardioembolic source. Dr. Doherty recommended outpatient JAVNA and Loop recorder. There was no atrial fibrillation reported on telemetry during his hospital stay.    He is an air conditioning tech- job includes walking, bending, climbing ladders, carrying work belt that weighs about 80 pounds. He is working part time. He denied any chest pain, SOB, or palpitations, lightheadedness. He does occasionally have episodes of feeling like his equilibrium is off when going up stairs; these episodes are brief and chronic since he injured his back  last year.    Medications: he is not missing any doses. Taking ASA, Plavix, and Zetia.  Sodium: he does add a small amount of sea salt to foods at the table,  he is not reading labels for sodium content. he does sometimes eat salty foods such as sausage.  Dietary Fats: eats a lot of vegetables daily; little meat-pork, turkey sausage; eats pasta, white or brown rice with veggies.   Dietary Sugars:  Kit Bonnie bars; Coke Zero; koolaid; ice cream; elana bars  Exercise: he is active during the day at work; he does not have exercise regimen.  Tobacco: former smoker   Alcohol: no alcohol use  Denied illicit drug use.     Weight: 96.2 kg (212 lb) he states that his daily weights has been stableBody mass index is 34.22 kg/m².   Wt Readings from Last 3 Encounters:   01/21/22 96.2 kg (212 lb)   01/06/22 91.2 kg (201 lb)   08/05/21 93.4 kg (206 lb)     BP log: None; he does not have a monitor to check it at home.      Review of Systems   Constitutional: Negative for chills, decreased appetite, fever, night sweats, weight gain and weight loss.   HENT: Negative for congestion.    Eyes: Positive for vision loss in left eye and vision loss in right eye (improving.).   Cardiovascular: Negative for chest pain, claudication, cyanosis, dyspnea on exertion, irregular heartbeat, leg swelling, near-syncope, orthopnea, palpitations, paroxysmal nocturnal dyspnea and syncope.   Respiratory: Negative for cough, hemoptysis, shortness of breath, sputum production and wheezing.    Hematologic/Lymphatic: Negative for adenopathy and bleeding problem. Does not bruise/bleed easily.   Skin: Negative for color change and nail changes.   Gastrointestinal: Negative for bloating, abdominal pain, change in bowel habit, heartburn, hematochezia, melena, nausea and vomiting.   Genitourinary: Negative for hematuria.   Neurological: Positive for dizziness. Negative for light-headedness.   Psychiatric/Behavioral: Negative for altered mental status.        Objective:   Physical Exam  Constitutional:       General: He is not in acute distress.     Appearance: He is well-developed and well-nourished. He is not diaphoretic.   HENT:      Head: Normocephalic and atraumatic.   Eyes:      General: No scleral icterus.     Conjunctiva/sclera: Conjunctivae normal.   Neck:      Thyroid: No thyromegaly.      Vascular: No JVD.      Trachea: No tracheal deviation.   Cardiovascular:      Rate and Rhythm: Normal rate and regular rhythm.      Pulses: Intact distal pulses.      Heart sounds: Normal heart sounds. No murmur heard.  No friction rub. No gallop.    Pulmonary:      Effort: Pulmonary effort is normal. No respiratory distress.      Breath sounds: Normal breath sounds. No wheezing or rales.   Chest:      Chest wall: No tenderness.   Abdominal:      General: Bowel sounds are normal. There is no distension.      Palpations: Abdomen is soft. There is no mass.      Tenderness: There is no abdominal tenderness. There is no guarding or rebound.   Musculoskeletal:         General: No edema. Normal range of motion.      Cervical back: Neck supple.   Lymphadenopathy:      Cervical: No cervical adenopathy.   Skin:     General: Skin is warm and dry.      Coloration: Skin is not pale.      Findings: No erythema or rash.      Comments: [place]   Neurological:      Mental Status: He is alert and oriented to person, place, and time.   Psychiatric:         Mood and Affect: Mood and affect normal.     Results for NIKKIE LI (MRN [id number]) as of 1/21/2022 09:12   Ref. Range 1/5/2022 16:48   Cholesterol Latest Ref Range: 120 - 199 mg/dL 151   HDL Latest Ref Range: 40 - 75 mg/dL 26 (L)   HDL/Cholesterol Ratio Latest Ref Range: 20.0 - 50.0 % 17.2 (L)   LDL Cholesterol External Latest Ref Range: 63.0 - 159.0 mg/dL 90.6   Non-HDL Cholesterol Latest Units: mg/dL 125   Total Cholesterol/HDL Ratio Latest Ref Range: 2.0 - 5.0  5.8 (H)   Triglycerides Latest Ref Range: 30 - 150 mg/dL 172 (H)    Hemoglobin A1C External Latest Ref Range: 4.0 - 5.6 % 6.1 (H)   Estimated Avg Glucose Latest Ref Range: 68 - 131 mg/dL 128   TSH Latest Ref Range: 0.400 - 4.000 uIU/mL 0.603   Results for NIKKIE LI (MRN [id number]) as of 1/21/2022 09:12   Ref. Range 1/7/2022 04:43 1/8/2022 10:53   Sodium Latest Ref Range: 136 - 145 mmol/L 136 139   Potassium Latest Ref Range: 3.5 - 5.1 mmol/L 5.2 (H) 4.9   Chloride Latest Ref Range: 95 - 110 mmol/L 105 105   CO2 Latest Ref Range: 23 - 29 mmol/L 23 23   Anion Gap Latest Ref Range: 8 - 16 mmol/L 8 11   BUN Latest Ref Range: 8 - 23 mg/dL 30 (H) 22   Creatinine Latest Ref Range: 0.5 - 1.4 mg/dL 1.3 1.3   eGFR if non African American Latest Ref Range: >60 mL/min/1.73 m^2 56 (A) 56 (A)   eGFR if African American Latest Ref Range: >60 mL/min/1.73 m^2 >60 >60   Glucose Latest Ref Range: 70 - 110 mg/dL 123 (H) 97   Calcium Latest Ref Range: 8.7 - 10.5 mg/dL 9.1 9.6   Magnesium Latest Ref Range: 1.6 - 2.6 mg/dL 1.8      Echo 1/6/2022: Summary  · The left ventricle is normal in size with concentric hypertrophy and normal systolic function.  · The estimated ejection fraction is 60%.  · Normal left ventricular diastolic function.  · Normal right ventricular size with normal right ventricular systolic function.  · There is mild aortic valve stenosis.  · Aortic valve area is 1.56 cm2; peak velocity is 2.47 m/s; mean gradient is 12 mmHg.  · Normal central venous pressure (3 mmHg).    CT of head and neck 01/06/2022:Impression:   Small, nonocclusive arterial thrombus within the left subclavian artery just proximal to the origin of the left vertebral artery.   Atherosclerotic disease of the bilateral proximal internal carotid arteries with less than 50% stenosis.  Posterolateral luminal irregularity of the proximal left ICA may reflect irregular atherosclerotic disease or tiny ulcerated plaque.   Bilateral vertebral arteries are patent.   No intracranial large vessel occlusion,  hemodynamically significant stenosis or aneurysm.   Acute left cerebellar infarct.  No acute intracranial hemorrhage.   This report was flagged in Epic as abnormal.    MRA of the brain without contrast 01/05/2022:  FINDINGS:  The right and left internal carotid arteries appear normal. The anterior and middle cerebral arteries appear normal also.   The vertebral arteries appear symmetric.  The posteroinferior cerebral artery appears patent bilaterally.  The basilar artery appears patent.  A left posterior communicating artery partially supplies the left posterior cerebral artery.   The posterior cerebral arteries appear patent however there is diminished flow related enhancement of the distal left posterior cerebral artery which is of uncertain significance.  Note that no obvious infarcts are evident in the distribution of the left posterior cerebral artery on the recent MRI.    Carotid ultrasound January 5, 2022:Impression:   1. Normal velocities and ratios in the extracranial right carotid system. Mild heterogeneous plaque is present. Peak systolic velocity in the right ICA is 128 cm/sec. No significant stenosis.   2. Normal velocities and ratios in the extracranial left carotid system. Mild to moderate plaque is present. Peak systolic velocity in the left ICA is 186 cm/sec.  This could reflect a 50-69% diameter stenosis.   3. Normal antegrade flow in vertebral arteries bilaterally.    MRI of the brain with and without contrast 12/22/2021:Impression:   Multiple small areas of recent infarction (acute or early subacute) involving the left cerebellar hemisphere and right occipital lobe as above.  No significant mass effect or associated hemorrhage.   Consider further evaluation with dedicated vascular imaging (i.e.  CTA head and neck).   Modest chronic microvascular ischemic change and small remote left frontal cortical infarct.   Small defect in the frontal lobe (middle frontal gyrus) as above, possibly remote  infarct versus developmental abnormality.   This report was flagged in Epic as abnormal.     Assessment:      1. Hospital discharge follow-up    2. Cerebral infarction due to unspecified occlusion or stenosis of left vertebral artery     3. Cerebrovascular accident (CVA), unspecified mechanism    4. Mixed hyperlipidemia    5. Prediabetes        Plan:     Hospital discharge follow-up    Cerebral infarction due to unspecified occlusion or stenosis of left vertebral artery   -     Ambulatory referral/consult to Cardiology  -     Transesophageal echo (JAVAN); Future    Cerebrovascular accident (CVA), unspecified mechanism    Mixed hyperlipidemia    Prediabetes    Continue ASA, Plavix and Zetia - CVA.  Recommend delaying colonoscopy until work up is complete and he is able to hold plavix-minimum of 3 months, and it may be longer than that. Note to Dr. Sanches.  Schedule JAVAN and loop recorder in [place] to complete CV workup for CVA.  Recommended smaller portions of starches and using whole grain rather than processed grains.  Encouraged exercise; stay active.  Monitor BP at home.  Sodium restriction encouraged.  Follow up with Dr. Alcazar in two months or call sooner for any problems.  Jennie Etienne NP  [facility]

## 2022-01-21 NOTE — Clinical Note
Mr. Barrera is undergoing continued workup for CVA. He will have to delay colonoscopy until he can he can hold plavix, which will be a minimum of 3 months. He is being scheduled for JAVAN and loop recorder implant. He will follow with Dr. Alcazar.

## 2022-01-21 NOTE — PROGRESS NOTES
Subjective:       Patient ID: Javier Barrera is a 66 y.o. male.    Chief Complaint: Back Pain (Pulled muscle)    Back Pain  This is a recurrent problem. The current episode started in the past 7 days. The problem occurs constantly. The problem is unchanged. The pain is present in the lumbar spine. The quality of the pain is described as aching, shooting and stabbing. The pain radiates to the left thigh. The pain is severe. The symptoms are aggravated by position, standing and twisting. Associated symptoms include leg pain. Pertinent negatives include no abdominal pain, chest pain, dysuria, fever or headaches. 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 He has tried analgesics and heat for the symptoms. The treatment provided no relief.   Unable to take NSAIDS due to current Plavix use    Review of Systems   Constitutional: Negative for fatigue, fever and unexpected weight change.   HENT: Negative for ear pain and sore throat.    Eyes: Negative.  Negative for pain and visual disturbance.   Respiratory: Negative for cough and shortness of breath.    Cardiovascular: Negative for chest pain and palpitations.   Gastrointestinal: Negative for abdominal pain, diarrhea, nausea and vomiting.   Genitourinary: Negative for dysuria and frequency.   Musculoskeletal: Positive for back pain. Negative for arthralgias and myalgias.   Skin: Negative for color change and rash.   Neurological: Negative for dizziness and headaches.   Psychiatric/Behavioral: Negative for sleep disturbance. The patient is not nervous/anxious.        Vitals:    01/21/22 1056   BP: (!) 140/85   Pulse: 74   Temp: 98 °F (36.7 °C)       Objective:     Current Outpatient Medications   Medication Sig Dispense Refill    aspirin (ECOTRIN) 81 MG EC tablet Take 1 tablet (81 mg total) by mouth once daily. 90 tablet 3    clopidogreL (PLAVIX) 75 mg tablet Take 1 tablet (75 mg total) by mouth once daily. 90  tablet 0    ezetimibe (ZETIA) 10 mg tablet Take 1 tablet (10 mg total) by mouth every evening. 30 tablet 3    gabapentin (NEURONTIN) 300 MG capsule Take 2 capsules (600 mg total) by mouth every evening. 30 capsule 1    lisinopriL (PRINIVIL,ZESTRIL) 20 MG tablet Take 20 mg by mouth once daily.      metFORMIN (GLUCOPHAGE) 500 MG tablet Take 500 mg by mouth 2 (two) times daily with meals.      cyclobenzaprine (FLEXERIL) 10 MG tablet Take 1 tablet (10 mg total) by mouth 3 (three) times daily as needed for Muscle spasms. (Patient not taking: Reported on 1/21/2022) 30 tablet 0    HYDROcodone-acetaminophen (NORCO) 5-325 mg per tablet Take 1 tablet by mouth every 6 (six) hours as needed for Pain. 20 tablet 0    potassium chloride SA (K-DUR,KLOR-CON) 20 MEQ tablet Take 20 mEq by mouth once daily.       No current facility-administered medications for this visit.       Physical Exam  Vitals and nursing note reviewed.   Constitutional:       General: He is not in acute distress.     Appearance: He is well-developed.   HENT:      Head: Normocephalic and atraumatic.   Eyes:      Extraocular Movements: EOM normal.      Pupils: Pupils are equal, round, and reactive to light.   Cardiovascular:      Rate and Rhythm: Normal rate and regular rhythm.   Pulmonary:      Effort: Pulmonary effort is normal.      Breath sounds: Normal breath sounds.   Musculoskeletal:      Cervical back: Normal range of motion and neck supple.      Lumbar back: Tenderness and bony tenderness present. Decreased range of motion.   Skin:     General: Skin is warm and dry.      Findings: No rash.   Neurological:      Mental Status: He is alert and oriented to person, place, and time.   Psychiatric:         Mood and Affect: Mood and affect normal.         Thought Content: Thought content normal.         Assessment:       1. Back strain, initial encounter    2. Left sided sciatica        Plan:   Back strain, initial encounter  -     dexamethasone injection  8 mg    Left sided sciatica  -     dexamethasone injection 8 mg    Other orders  -     cyclobenzaprine (FLEXERIL) 10 MG tablet; Take 1 tablet (10 mg total) by mouth 3 (three) times daily as needed for Muscle spasms. (Patient not taking: Reported on 1/21/2022)  Dispense: 30 tablet; Refill: 0  -     HYDROcodone-acetaminophen (NORCO) 5-325 mg per tablet; Take 1 tablet by mouth every 6 (six) hours as needed for Pain.  Dispense: 20 tablet; Refill: 0        No follow-ups on file.    There are no Patient Instructions on file for this visit.

## 2022-01-24 NOTE — PROGRESS NOTES
"  Physical Therapy Daily Treatment Note     Name: Javier Barrera  Clinic Number: [id number]    Therapy Diagnosis:   Encounter Diagnosis   Name Primary?    Impaired functional mobility and activity tolerance      Physician: Henri Devine*    Visit Date: 1/25/2022  Physician Orders: PT Eval and Treat  Medical Diagnosis from Referral: I63.9 (ICD-10-CM) - Cerebrovascular accident (CVA), unspecified mechanism  Evaluation Date: 1/12/2022  Authorization Period Expiration: 12/31/2022  Plan of Care Expiration: 3/8/2022  Visit # / Visits authorized: 2/20 auth (1/1 Eval)  PTA Visit #: 1/5     Time In: 1045 am  Time Out: 1125 am  Total Billable Time: 40 minutes     Precautions: Standard, HTN, [place] Diabetic, Allergic to Penicillin, Hx of CVA, Hx of recent COVID-19, visual deficits    Subjective     Pt reports: last Wednesday night he "pulled something in his back". He took some medicine but its not touching the pain. His sciatic nerve on his left side, and 5 pinched nerves tend to give him trouble. Last Thursday he was hurting so back he could not even reach the phone to call and cancel.    He was compliant with home exercise program.  Response to previous treatment: initial evaluation  Functional change: improved overall mobility    Pain: 8/10  Location: Left upper trap; left low back    Objective     Javier received therapeutic exercises to develop strength, endurance, ROM, flexibility, posture and core stabilization for 10 minutes including:     Recumbent bike 5 min lvl 5 - 10 mins today   Treadmill 2.0 3 min no incline (NP today)    Possible next visit: reboundcaleb Herrera participated in neuromuscular re-education activities to improve: Balance, Coordination, Proprioception and Posture for 30 minutes. The following activities were included:     (all performed in // bars)  Tandem walking 5 sets  Retro gait 5 sets  Side stepping, eyes closed 5 sets  Single leg stance 4x20 sec each leg  Airex foam, both feet, " eyes closed, no UE support 4x30 sec   Tandem stance looking up/down 1 x20s each  Tandem stance looking side/side 1 x20s each  Tandem walking on foam x4 sets  Step ups, 8 in, no UE support x15 reps each leg  Chair squats no UE support x30 reps     Home Exercises and Patient Education Provided     Education provided:   - HEP reviewed  - Anatomy and Physiology pertaining to current condition  - Possible response to exercise  - Importance of PT compliance     Written Home Exercises Provided: Patient instructed to continue prior HEP.  Exercises were reviewed and Javier was able to demonstrate them prior to the end of the session.  Javier demonstrated good  understanding of the education provided.      See EMR under Patient Instructions for exercises provided 1/12/2022.    Assessment     Patient did well with all balance activities this session despite increased pain in his lower back. Treadmill was held this session secondary to increased back pain. He had some moments of LOB but was able to self correct with use of UE on parallel bars.  Javier Is progressing well towards his goals.   Pt prognosis is Good.     Pt will continue to benefit from skilled outpatient physical therapy to address the deficits listed in the problem list box on initial evaluation, provide pt/family education and to maximize pt's level of independence in the home and community environment.     Pt's spiritual, cultural and educational needs considered and pt agreeable to plan of care and goals.    Anticipated barriers to physical therapy: recent CVA, visual deficits    Goals:  Short Term Goals: 4 weeks   1. Pt will be independent with HEP to facilitate healing and improve outcome measures. (progressing, not met)  2. Pt will increase gross LE/UE strength by at least a half grade. (progressing, not met)  3. Pt will demonstrate improved postural awareness to assist with balance and reduced pain levels in neck. (progressing, not met)  4. Pt will improve FOTO  score by 5% or greater. (progressing, not met)     Long Term Goals: 8 weeks   1. Pt will demonstrate proper safety awareness with ambulation, transfers, and ADLs. (progressing, not met)  2. Pt will demonstrate strength 5/5 in BLE, BUE. (progressing, not met)  3. Pt will demonstrate sufficient balance in single leg stance, tandem stance, compliant surface, and without visual dependency. (progressing, not met)    Plan     Continue PT to progress functional mobility and address limitations.     Robbin Hidalgo, PTA

## 2022-01-25 ENCOUNTER — CLINICAL SUPPORT (OUTPATIENT)
Dept: REHABILITATION | Facility: HOSPITAL | Age: 67
End: 2022-01-25
Attending: INTERNAL MEDICINE
Payer: MEDICARE

## 2022-01-25 DIAGNOSIS — Z45.09 ENCOUNTER FOR LOOP RECORDER CHECK: ICD-10-CM

## 2022-01-25 DIAGNOSIS — I63.9 CEREBROVASCULAR ACCIDENT (CVA), UNSPECIFIED MECHANISM: Primary | ICD-10-CM

## 2022-01-25 DIAGNOSIS — Z74.09 IMPAIRED FUNCTIONAL MOBILITY AND ACTIVITY TOLERANCE: ICD-10-CM

## 2022-01-25 PROCEDURE — 97112 NEUROMUSCULAR REEDUCATION: CPT | Mod: KX,PN,CQ

## 2022-01-25 PROCEDURE — 97110 THERAPEUTIC EXERCISES: CPT | Mod: KX,PN,CQ

## 2022-01-27 ENCOUNTER — LAB VISIT (OUTPATIENT)
Dept: LAB | Facility: HOSPITAL | Age: 67
End: 2022-01-27
Attending: NURSE PRACTITIONER
Payer: MEDICARE

## 2022-01-27 DIAGNOSIS — Z01.818 PREOP TESTING: ICD-10-CM

## 2022-01-27 DIAGNOSIS — I63.012 CEREBRAL INFARCTION DUE TO THROMBOSIS OF LEFT VERTEBRAL ARTERY: ICD-10-CM

## 2022-01-27 LAB
ANION GAP SERPL CALC-SCNC: 8 MMOL/L (ref 8–16)
APTT BLDCRRT: 24.2 SEC (ref 21–32)
BUN SERPL-MCNC: 22 MG/DL (ref 8–23)
CALCIUM SERPL-MCNC: 10.1 MG/DL (ref 8.7–10.5)
CHLORIDE SERPL-SCNC: 105 MMOL/L (ref 95–110)
CO2 SERPL-SCNC: 27 MMOL/L (ref 23–29)
CREAT SERPL-MCNC: 1.4 MG/DL (ref 0.5–1.4)
ERYTHROCYTE [DISTWIDTH] IN BLOOD BY AUTOMATED COUNT: 13.7 % (ref 11.5–14.5)
EST. GFR  (AFRICAN AMERICAN): 59.7 ML/MIN/1.73 M^2
EST. GFR  (NON AFRICAN AMERICAN): 51.6 ML/MIN/1.73 M^2
GLUCOSE SERPL-MCNC: 88 MG/DL (ref 70–110)
HCT VFR BLD AUTO: 41.5 % (ref 40–54)
HGB BLD-MCNC: 13.2 G/DL (ref 14–18)
INR PPP: 0.9 (ref 0.8–1.2)
MCH RBC QN AUTO: 30.7 PG (ref 27–31)
MCHC RBC AUTO-ENTMCNC: 31.8 G/DL (ref 32–36)
MCV RBC AUTO: 97 FL (ref 82–98)
PLATELET # BLD AUTO: 300 K/UL (ref 150–450)
PMV BLD AUTO: 10.8 FL (ref 9.2–12.9)
POTASSIUM SERPL-SCNC: 5.1 MMOL/L (ref 3.5–5.1)
PROTHROMBIN TIME: 9.6 SEC (ref 9–12.5)
RBC # BLD AUTO: 4.3 M/UL (ref 4.6–6.2)
SODIUM SERPL-SCNC: 140 MMOL/L (ref 136–145)
WBC # BLD AUTO: 8.47 K/UL (ref 3.9–12.7)

## 2022-01-27 PROCEDURE — 85610 PROTHROMBIN TIME: CPT | Performed by: NURSE PRACTITIONER

## 2022-01-27 PROCEDURE — 85027 COMPLETE CBC AUTOMATED: CPT | Mod: PO | Performed by: NURSE PRACTITIONER

## 2022-01-27 PROCEDURE — 85730 THROMBOPLASTIN TIME PARTIAL: CPT | Performed by: NURSE PRACTITIONER

## 2022-01-27 PROCEDURE — 80048 BASIC METABOLIC PNL TOTAL CA: CPT | Performed by: NURSE PRACTITIONER

## 2022-01-27 PROCEDURE — 36415 COLL VENOUS BLD VENIPUNCTURE: CPT | Mod: PO | Performed by: NURSE PRACTITIONER

## 2022-02-02 ENCOUNTER — ANESTHESIA (OUTPATIENT)
Dept: CARDIOLOGY | Facility: HOSPITAL | Age: 67
End: 2022-02-02
Payer: MEDICARE

## 2022-02-02 ENCOUNTER — ANESTHESIA EVENT (OUTPATIENT)
Dept: CARDIOLOGY | Facility: HOSPITAL | Age: 67
End: 2022-02-02
Payer: MEDICARE

## 2022-02-02 ENCOUNTER — HOSPITAL ENCOUNTER (OUTPATIENT)
Facility: HOSPITAL | Age: 67
Discharge: HOME | End: 2022-02-02
Attending: INTERNAL MEDICINE | Admitting: INTERNAL MEDICINE
Payer: MEDICARE

## 2022-02-02 VITALS
RESPIRATION RATE: 18 BRPM | HEART RATE: 81 BPM | OXYGEN SATURATION: 96 % | SYSTOLIC BLOOD PRESSURE: 120 MMHG | WEIGHT: 211 LBS | DIASTOLIC BLOOD PRESSURE: 90 MMHG | TEMPERATURE: 98 F | HEIGHT: 66 IN | BODY MASS INDEX: 33.91 KG/M2

## 2022-02-02 DIAGNOSIS — I63.9 ACUTE CVA (CEREBROVASCULAR ACCIDENT): ICD-10-CM

## 2022-02-02 DIAGNOSIS — I63.9 CVA (CEREBRAL VASCULAR ACCIDENT): ICD-10-CM

## 2022-02-02 LAB
ANION GAP SERPL CALC-SCNC: 9 MMOL/L (ref 8–16)
BSA FOR ECHO PROCEDURE: 2.11 M2
BUN SERPL-MCNC: 22 MG/DL (ref 8–23)
CALCIUM SERPL-MCNC: 9.5 MG/DL (ref 8.7–10.5)
CHLORIDE SERPL-SCNC: 105 MMOL/L (ref 95–110)
CO2 SERPL-SCNC: 25 MMOL/L (ref 23–29)
CREAT SERPL-MCNC: 1.2 MG/DL (ref 0.5–1.4)
CTP QC/QA: YES
EJECTION FRACTION: 60 %
ERYTHROCYTE [DISTWIDTH] IN BLOOD BY AUTOMATED COUNT: 13.6 % (ref 11.5–14.5)
EST. GFR  (AFRICAN AMERICAN): >60 ML/MIN/1.73 M^2
EST. GFR  (NON AFRICAN AMERICAN): >60 ML/MIN/1.73 M^2
GLUCOSE SERPL-MCNC: 113 MG/DL (ref 70–110)
HCT VFR BLD AUTO: 40.4 % (ref 40–54)
HGB BLD-MCNC: 13 G/DL (ref 14–18)
INR PPP: 0.8 (ref 0.8–1.2)
MCH RBC QN AUTO: 30.8 PG (ref 27–31)
MCHC RBC AUTO-ENTMCNC: 32.2 G/DL (ref 32–36)
MCV RBC AUTO: 96 FL (ref 82–98)
PLATELET # BLD AUTO: 293 K/UL (ref 150–450)
PMV BLD AUTO: 11 FL (ref 9.2–12.9)
POTASSIUM SERPL-SCNC: 5.3 MMOL/L (ref 3.5–5.1)
POTASSIUM SERPL-SCNC: 5.8 MMOL/L (ref 3.5–5.1)
PROTHROMBIN TIME: 9.5 SEC (ref 9–12.5)
RBC # BLD AUTO: 4.22 M/UL (ref 4.6–6.2)
SARS-COV-2 AG RESP QL IA.RAPID: NEGATIVE
SODIUM SERPL-SCNC: 139 MMOL/L (ref 136–145)
WBC # BLD AUTO: 8.9 K/UL (ref 3.9–12.7)

## 2022-02-02 PROCEDURE — 25000003 PHARM REV CODE 250: Performed by: NURSE ANESTHETIST, CERTIFIED REGISTERED

## 2022-02-02 PROCEDURE — 80048 BASIC METABOLIC PNL TOTAL CA: CPT | Performed by: INTERNAL MEDICINE

## 2022-02-02 PROCEDURE — 85027 COMPLETE CBC AUTOMATED: CPT | Performed by: INTERNAL MEDICINE

## 2022-02-02 PROCEDURE — 85610 PROTHROMBIN TIME: CPT | Performed by: INTERNAL MEDICINE

## 2022-02-02 PROCEDURE — 33285 PR INSERTION,SUBQ CARDIAC RHYTHM MONITOR, W/PRGRMG: ICD-10-PCS | Mod: ,,, | Performed by: INTERNAL MEDICINE

## 2022-02-02 PROCEDURE — 63600175 PHARM REV CODE 636 W HCPCS: Performed by: NURSE ANESTHETIST, CERTIFIED REGISTERED

## 2022-02-02 PROCEDURE — 33285 INSJ SUBQ CAR RHYTHM MNTR: CPT | Performed by: INTERNAL MEDICINE

## 2022-02-02 PROCEDURE — 84132 ASSAY OF SERUM POTASSIUM: CPT | Performed by: INTERNAL MEDICINE

## 2022-02-02 PROCEDURE — 33285 INSJ SUBQ CAR RHYTHM MNTR: CPT | Mod: ,,, | Performed by: INTERNAL MEDICINE

## 2022-02-02 PROCEDURE — 25000003 PHARM REV CODE 250: Performed by: INTERNAL MEDICINE

## 2022-02-02 PROCEDURE — C1764 EVENT RECORDER, CARDIAC: HCPCS | Performed by: INTERNAL MEDICINE

## 2022-02-02 PROCEDURE — 37000008 HC ANESTHESIA 1ST 15 MINUTES: Performed by: INTERNAL MEDICINE

## 2022-02-02 DEVICE — SYS LINQ II MON INSRTBL CARD: Type: IMPLANTABLE DEVICE | Site: CHEST  WALL | Status: FUNCTIONAL

## 2022-02-02 RX ORDER — LIDOCAINE HYDROCHLORIDE 20 MG/ML
INJECTION, SOLUTION EPIDURAL; INFILTRATION; INTRACAUDAL; PERINEURAL
Status: DISCONTINUED | OUTPATIENT
Start: 2022-02-02 | End: 2022-02-02 | Stop reason: HOSPADM

## 2022-02-02 RX ORDER — SODIUM CHLORIDE 9 MG/ML
INJECTION, SOLUTION INTRAVENOUS CONTINUOUS
Status: DISCONTINUED | OUTPATIENT
Start: 2022-02-02 | End: 2022-02-02 | Stop reason: HOSPADM

## 2022-02-02 RX ORDER — SODIUM CHLORIDE 0.9 % (FLUSH) 0.9 %
10 SYRINGE (ML) INJECTION
Status: DISCONTINUED | OUTPATIENT
Start: 2022-02-02 | End: 2022-02-02 | Stop reason: HOSPADM

## 2022-02-02 RX ORDER — PROPOFOL 10 MG/ML
VIAL (ML) INTRAVENOUS
Status: DISCONTINUED | OUTPATIENT
Start: 2022-02-02 | End: 2022-02-02

## 2022-02-02 RX ORDER — LIDOCAINE HYDROCHLORIDE 20 MG/ML
SOLUTION OROPHARYNGEAL
Status: DISCONTINUED | OUTPATIENT
Start: 2022-02-02 | End: 2022-02-02

## 2022-02-02 RX ORDER — SODIUM CHLORIDE 9 MG/ML
INJECTION, SOLUTION INTRAVENOUS ONCE
Status: DISCONTINUED | OUTPATIENT
Start: 2022-02-02 | End: 2022-02-02 | Stop reason: HOSPADM

## 2022-02-02 RX ORDER — VANCOMYCIN HCL IN 5 % DEXTROSE 1G/250ML
1000 PLASTIC BAG, INJECTION (ML) INTRAVENOUS
Status: DISCONTINUED | OUTPATIENT
Start: 2022-02-02 | End: 2022-02-02 | Stop reason: HOSPADM

## 2022-02-02 RX ORDER — LIDOCAINE HYDROCHLORIDE 20 MG/ML
SOLUTION OROPHARYNGEAL
Status: DISCONTINUED | OUTPATIENT
Start: 2022-02-02 | End: 2022-02-02 | Stop reason: HOSPADM

## 2022-02-02 RX ORDER — LIDOCAINE HYDROCHLORIDE 10 MG/ML
INJECTION, SOLUTION EPIDURAL; INFILTRATION; INTRACAUDAL; PERINEURAL
Status: DISCONTINUED | OUTPATIENT
Start: 2022-02-02 | End: 2022-02-02

## 2022-02-02 RX ADMIN — PROPOFOL 30 MG: 10 INJECTION, EMULSION INTRAVENOUS at 09:02

## 2022-02-02 RX ADMIN — LIDOCAINE HYDROCHLORIDE 50 MG: 10 INJECTION, SOLUTION EPIDURAL; INFILTRATION; INTRACAUDAL; PERINEURAL at 09:02

## 2022-02-02 RX ADMIN — LIDOCAINE HYDROCHLORIDE 10 ML: 20 SOLUTION ORAL; TOPICAL at 09:02

## 2022-02-02 RX ADMIN — SODIUM CHLORIDE, POTASSIUM CHLORIDE, SODIUM LACTATE AND CALCIUM CHLORIDE: 600; 310; 30; 20 INJECTION, SOLUTION INTRAVENOUS at 09:02

## 2022-02-02 RX ADMIN — PROPOFOL 70 MG: 10 INJECTION, EMULSION INTRAVENOUS at 09:02

## 2022-02-02 NOTE — PLAN OF CARE
Pt ambulatory to bathroom with steady gait, able to tolerate PO without difficulty. VSS, ZORAIDA. Pt discharged to friend's care via WC at this time.

## 2022-02-02 NOTE — TRANSFER OF CARE
"Anesthesia Transfer of Care Note    Patient: Javier Barrera    Procedure(s) Performed: Procedure(s) (LRB):  TRANSESOPHAGEAL ECHOCARDIOGRAM WITH POSSIBLE CARDIOVERSION (JAVAN W/ POSS CARDIOVERSION) (N/A)    Patient location: PACU    Anesthesia Type: MAC    Transport from OR: Transported from OR on room air with adequate spontaneous ventilation    Post pain: adequate analgesia    Post assessment: no apparent anesthetic complications    Post vital signs: stable    Level of consciousness: awake    Nausea/Vomiting: no nausea/vomiting    Complications: none    Transfer of care protocol was followed      Last vitals:   Visit Vitals  /74   Pulse 70   Temp 36.3 °C (97.3 °F) (Temporal)   Resp 20   Ht 5' 6" (1.676 m)   Wt 95.7 kg (211 lb)   SpO2 100%   BMI 34.06 kg/m²     "

## 2022-02-02 NOTE — BRIEF OP NOTE
<[facility] - Cath Lab ([facility])  Surgery Department  Operative Note    SUMMARY     Date of Procedure: 2/2/2022     Procedure: Procedure(s) (LRB):  TRANSESOPHAGEAL ECHOCARDIOGRAM WITH POSSIBLE CARDIOVERSION (JAVAN W/ POSS CARDIOVERSION) (N/A)     Surgeon(s) and Role:     * Jairon Barahona MD - Primary    Assisting Surgeon: None    Pre-Operative Diagnosis: Acute CVA (cerebrovascular accident) [I63.9]    Post-Operative Diagnosis: Post-Op Diagnosis Codes:     * Acute CVA (cerebrovascular accident) [I63.9]    Anesthesia: Monitor Anesthesia Care    Technical Procedures Used: JAVAN    Description of the Findings of the Procedure: JAVAN, DX: CVA, FINDINGS: NML JAVAN, nml bubble study    Significant Surgical Tasks Conducted by the Assistant(s), if Applicable: NONE    Complications: No    Estimated Blood Loss (EBL): < 50 cc           Implants: * No implants in log *    Specimens:   Specimen (24h ago, onward)            None                  Condition: Good    Disposition: PACU - hemodynamically stable.    Attestation: I was present and scrubbed for the entire procedure.

## 2022-02-02 NOTE — ANESTHESIA PREPROCEDURE EVALUATION
02/02/2022  Javier Harish Barrera is a 66 y.o., male.  Patient Active Problem List   Diagnosis    Herpes simplex type 1 infection    Mixed hyperlipidemia    Prediabetes    Vitamin D deficiency    Bilateral carpal tunnel syndrome    Acute CVA (cerebrovascular accident)    Compulsive scratching behavior    Tinea cruris    SANTA (acute kidney injury)    Cervical disc disease    COVID-19 virus detected    Impaired functional mobility and activity tolerance     Past Surgical History:   Procedure Laterality Date    HEMORRHOID SURGERY       Lab Results   Component Value Date    WBC 8.47 01/27/2022    HGB 13.2 (L) 01/27/2022    HCT 41.5 01/27/2022    MCV 97 01/27/2022     01/27/2022       Chemistry        Component Value Date/Time     02/02/2022 0807    K 5.3 (H) 02/02/2022 0853     02/02/2022 0807    CO2 25 02/02/2022 0807    BUN 22 02/02/2022 0807    CREATININE 1.2 02/02/2022 0807     (H) 02/02/2022 0807        Component Value Date/Time    CALCIUM 9.5 02/02/2022 0807    ALKPHOS 66 01/06/2022 0448    AST 25 01/06/2022 0448    ALT 26 01/06/2022 0448    BILITOT 0.4 01/06/2022 0448    ESTGFRAFRICA >60 02/02/2022 0807    EGFRNONAA >60 02/02/2022 0807            Chemistry        Component Value Date/Time     01/27/2022 0819    K 5.1 01/27/2022 0819     01/27/2022 0819    CO2 27 01/27/2022 0819    BUN 22 01/27/2022 0819    CREATININE 1.4 01/27/2022 0819    GLU 88 01/27/2022 0819        Component Value Date/Time    CALCIUM 10.1 01/27/2022 0819    ALKPHOS 66 01/06/2022 0448    AST 25 01/06/2022 0448    ALT 26 01/06/2022 0448    BILITOT 0.4 01/06/2022 0448    ESTGFRAFRICA 59.7 (A) 01/27/2022 0819    EGFRNONAA 51.6 (A) 01/27/2022 0819          Chemistry        Component Value Date/Time     02/02/2022 0807    K 5.8 (H) 02/02/2022 0807     02/02/2022 0807    CO2 25  02/02/2022 0807    BUN 22 02/02/2022 0807    CREATININE 1.2 02/02/2022 0807     (H) 02/02/2022 0807        Component Value Date/Time    CALCIUM 9.5 02/02/2022 0807    ALKPHOS 66 01/06/2022 0448    AST 25 01/06/2022 0448    ALT 26 01/06/2022 0448    BILITOT 0.4 01/06/2022 0448    ESTGFRAFRICA >60 02/02/2022 0807    EGFRNONAA >60 02/02/2022 0807            Anesthesia Evaluation    I have reviewed the Patient Summary Reports.    I have reviewed the Nursing Notes. I have reviewed the NPO Status.   I have reviewed the Medications.     Review of Systems  Anesthesia Hx:  No problems with previous Anesthesia Denies Hx of Anesthetic complications  Denies Family Hx of Anesthesia complications.   Denies Personal Hx of Anesthesia complications.   Cardiovascular:   Hypertension    Renal/:   Chronic Renal Disease    Neurological:   CVA Neuromuscular Disease,    Endocrine:   Diabetes, type 2        Physical Exam  General:  Obesity    Airway/Jaw/Neck:  Airway Findings: Mouth Opening: Normal Tongue: Normal  General Airway Assessment: Adult  Mallampati: II  Improves to II with phonation.  TM Distance: Normal, at least 6 cm            Mental Status:  Mental Status Findings:  Cooperative, Alert and Oriented         Anesthesia Plan  Type of Anesthesia, risks & benefits discussed:  Anesthesia Type:  MAC    Patient's Preference:   Plan Factors:          Intra-op Monitoring Plan:   Intra-op Monitoring Plan Comments:   Post Op Pain Control Plan: per primary service following discharge from PACU  Post Op Pain Control Plan Comments:     Induction:    Beta Blocker:         Informed Consent: Patient understands risks and agrees with Anesthesia plan.  Questions answered. Anesthesia consent signed with patient.  ASA Score: 3     Day of Surgery Review of History & Physical: I have interviewed and examined the patient. I have reviewed the patient's H&P dated:  There are no significant changes.  H&P update referred to the surgeon.      Anesthesia Plan Notes: K 5.8. some hemolysis noted per lab. Will re-draw blood for potassium.         Ready For Surgery From Anesthesia Perspective.

## 2022-02-02 NOTE — ANESTHESIA POSTPROCEDURE EVALUATION
Anesthesia Post Evaluation    Patient: Javier Barrera    Procedure(s) Performed: Procedure(s) (LRB):  TRANSESOPHAGEAL ECHOCARDIOGRAM WITH POSSIBLE CARDIOVERSION (JAVAN W/ POSS CARDIOVERSION) (N/A)    Final Anesthesia Type: MAC      Patient location during evaluation: PACU  Patient participation: Yes- Able to Participate  Level of consciousness: awake and alert  Post-procedure vital signs: reviewed and stable  Pain management: adequate  Airway patency: patent    PONV status at discharge: No PONV  Anesthetic complications: no      Cardiovascular status: hemodynamically stable  Respiratory status: spontaneous ventilation  Hydration status: euvolemic  Follow-up not needed.          Vitals Value Taken Time   /74 02/02/22 0800   Temp 36.3 °C (97.3 °F) 02/02/22 0759   Pulse 70 02/02/22 0759   Resp 20 02/02/22 0759   SpO2 100 % 02/02/22 0759         No case tracking events are documented in the log.      Pain/Elda Score: Elda Score: 10 (2/2/2022  9:24 AM)

## 2022-02-02 NOTE — H&P
Patient ID:  Javier Barrera is a 66 y.o. male who presents for evaluation of Pre-op Exam        HPI: Mr. Javier Barrera presents to the clinic for preop cardiovascular exam and hospital discharge follow-up.  He has a history hypertension, hyperlipidemia, prediabetes, chronic neck and back pain.  he stated that he is doing pretty well; his major complaint is back pain which is chronic.     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 After MRI was done, he was sent to ER for CVA workup.  He was admitted to [facility] on January 5, 2022 with actue right occipital and left cerebellar strokes with visual disturbance and intermittent dizziness. He was evaluated by Neuro-ophthamology and found to have ischemic optic neuropathy bilaterally from eye exam.  MRI showed subacute right occipital and left cerebellar strokes. He also tested positive for COVID on 12/31/21. He was started on ASA,Plavix, and statin and changed to Zetia due to statin intolerance. CTA head and neck show small, nonocclusive arterial thrombus within the left subclavian artery just proximal to the origin of the left vertebral artery. DATP was recommended for 3 months with F/U with neurovascular team in [place] and recommended outpatient PT/OT.  Cardiology was consulted for JAVAN to evaluate potential for cardioembolic source. Dr. Doherty recommended outpatient JAVAN and Loop recorder. There was no atrial fibrillation reported on telemetry during his hospital stay.     He is an air conditioning tech- job includes walking, bending, climbing ladders, carrying work belt that weighs about 80 pounds. He is working part time. He denied any chest pain, SOB, or palpitations, lightheadedness. He does occasionally have episodes of feeling like his equilibrium is off when going up stairs; these episodes are brief and chronic since he injured his back last  year.     Medications: he is not missing any doses. Taking ASA, Plavix, and Zetia.  Sodium: he does add a small amount of sea salt to foods at the table,  he is not reading labels for sodium content. he does sometimes eat salty foods such as sausage.  Dietary Fats: eats a lot of vegetables daily; little meat-pork, turkey sausage; eats pasta, white or brown rice with veggies.   Dietary Sugars:  Kit Bonnie bars; Coke Zero; koolaid; ice cream; elana bars  Exercise: he is active during the day at work; he does not have exercise regimen.  Tobacco: former smoker   Alcohol: no alcohol use  Denied illicit drug use.      Weight: 96.2 kg (212 lb) he states that his daily weights has been stableBody mass index is 34.22 kg/m².       Wt Readings from Last 3 Encounters:   01/21/22 96.2 kg (212 lb)   01/06/22 91.2 kg (201 lb)   08/05/21 93.4 kg (206 lb)      BP log: None; he does not have a monitor to check it at home.        Review of Systems   Constitutional: Negative for chills, decreased appetite, fever, night sweats, weight gain and weight loss.   HENT: Negative for congestion.    Eyes: Positive for vision loss in left eye and vision loss in right eye (improving.).   Cardiovascular: Negative for chest pain, claudication, cyanosis, dyspnea on exertion, irregular heartbeat, leg swelling, near-syncope, orthopnea, palpitations, paroxysmal nocturnal dyspnea and syncope.   Respiratory: Negative for cough, hemoptysis, shortness of breath, sputum production and wheezing.    Hematologic/Lymphatic: Negative for adenopathy and bleeding problem. Does not bruise/bleed easily.   Skin: Negative for color change and nail changes.   Gastrointestinal: Negative for bloating, abdominal pain, change in bowel habit, heartburn, hematochezia, melena, nausea and vomiting.   Genitourinary: Negative for hematuria.   Neurological: Positive for dizziness. Negative for light-headedness.   Psychiatric/Behavioral: Negative for altered mental status.          Objective:   Physical Exam  Constitutional:       General: He is not in acute distress.     Appearance: He is well-developed and well-nourished. He is not diaphoretic.   HENT:      Head: Normocephalic and atraumatic.   Eyes:      General: No scleral icterus.     Conjunctiva/sclera: Conjunctivae normal.   Neck:      Thyroid: No thyromegaly.      Vascular: No JVD.      Trachea: No tracheal deviation.   Cardiovascular:      Rate and Rhythm: Normal rate and regular rhythm.      Pulses: Intact distal pulses.      Heart sounds: Normal heart sounds. No murmur heard.  No friction rub. No gallop.    Pulmonary:      Effort: Pulmonary effort is normal. No respiratory distress.      Breath sounds: Normal breath sounds. No wheezing or rales.   Chest:      Chest wall: No tenderness.   Abdominal:      General: Bowel sounds are normal. There is no distension.      Palpations: Abdomen is soft. There is no mass.      Tenderness: There is no abdominal tenderness. There is no guarding or rebound.   Musculoskeletal:         General: No edema. Normal range of motion.      Cervical back: Neck supple.   Lymphadenopathy:      Cervical: No cervical adenopathy.   Skin:     General: Skin is warm and dry.      Coloration: Skin is not pale.      Findings: No erythema or rash.      Comments: F[place]il   Neurological:      Mental Status: He is alert and oriented to person, place, and time.   Psychiatric:         Mood and Affect: Mood and affect normal.      Results for NIKKIE LI (MRN [id number]) as of 1/21/2022 09:12    Ref. Range 1/5/2022 16:48   Cholesterol Latest Ref Range: 120 - 199 mg/dL 151   HDL Latest Ref Range: 40 - 75 mg/dL 26 (L)   HDL/Cholesterol Ratio Latest Ref Range: 20.0 - 50.0 % 17.2 (L)   LDL Cholesterol External Latest Ref Range: 63.0 - 159.0 mg/dL 90.6   Non-HDL Cholesterol Latest Units: mg/dL 125   Total Cholesterol/HDL Ratio Latest Ref Range: 2.0 - 5.0  5.8 (H)   Triglycerides Latest Ref Range: 30 - 150 mg/dL 172  (H)   Hemoglobin A1C External Latest Ref Range: 4.0 - 5.6 % 6.1 (H)   Estimated Avg Glucose Latest Ref Range: 68 - 131 mg/dL 128   TSH Latest Ref Range: 0.400 - 4.000 uIU/mL 0.603   Results for NIKKIE LI (MRN [id number]) as of 1/21/2022 09:12    Ref. Range 1/7/2022 04:43 1/8/2022 10:53   Sodium Latest Ref Range: 136 - 145 mmol/L 136 139   Potassium Latest Ref Range: 3.5 - 5.1 mmol/L 5.2 (H) 4.9   Chloride Latest Ref Range: 95 - 110 mmol/L 105 105   CO2 Latest Ref Range: 23 - 29 mmol/L 23 23   Anion Gap Latest Ref Range: 8 - 16 mmol/L 8 11   BUN Latest Ref Range: 8 - 23 mg/dL 30 (H) 22   Creatinine Latest Ref Range: 0.5 - 1.4 mg/dL 1.3 1.3   eGFR if non African American Latest Ref Range: >60 mL/min/1.73 m^2 56 (A) 56 (A)   eGFR if African American Latest Ref Range: >60 mL/min/1.73 m^2 >60 >60   Glucose Latest Ref Range: 70 - 110 mg/dL 123 (H) 97   Calcium Latest Ref Range: 8.7 - 10.5 mg/dL 9.1 9.6   Magnesium Latest Ref Range: 1.6 - 2.6 mg/dL 1.8        Echo 1/6/2022: Summary  · The left ventricle is normal in size with concentric hypertrophy and normal systolic function.  · The estimated ejection fraction is 60%.  · Normal left ventricular diastolic function.  · Normal right ventricular size with normal right ventricular systolic function.  · There is mild aortic valve stenosis.  · Aortic valve area is 1.56 cm2; peak velocity is 2.47 m/s; mean gradient is 12 mmHg.  · Normal central venous pressure (3 mmHg).     CT of head and neck 01/06/2022:Impression:   Small, nonocclusive arterial thrombus within the left subclavian artery just proximal to the origin of the left vertebral artery.   Atherosclerotic disease of the bilateral proximal internal carotid arteries with less than 50% stenosis.  Posterolateral luminal irregularity of the proximal left ICA may reflect irregular atherosclerotic disease or tiny ulcerated plaque.   Bilateral vertebral arteries are patent.   No intracranial large vessel occlusion,  hemodynamically significant stenosis or aneurysm.   Acute left cerebellar infarct.  No acute intracranial hemorrhage.   This report was flagged in Epic as abnormal.     MRA of the brain without contrast 01/05/2022:  FINDINGS:  The right and left internal carotid arteries appear normal. The anterior and middle cerebral arteries appear normal also.   The vertebral arteries appear symmetric.  The posteroinferior cerebral artery appears patent bilaterally.  The basilar artery appears patent.  A left posterior communicating artery partially supplies the left posterior cerebral artery.   The posterior cerebral arteries appear patent however there is diminished flow related enhancement of the distal left posterior cerebral artery which is of uncertain significance.  Note that no obvious infarcts are evident in the distribution of the left posterior cerebral artery on the recent MRI.     Carotid ultrasound January 5, 2022:Impression:   1. Normal velocities and ratios in the extracranial right carotid system. Mild heterogeneous plaque is present. Peak systolic velocity in the right ICA is 128 cm/sec. No significant stenosis.   2. Normal velocities and ratios in the extracranial left carotid system. Mild to moderate plaque is present. Peak systolic velocity in the left ICA is 186 cm/sec.  This could reflect a 50-69% diameter stenosis.   3. Normal antegrade flow in vertebral arteries bilaterally.     MRI of the brain with and without contrast 12/22/2021:Impression:   Multiple small areas of recent infarction (acute or early subacute) involving the left cerebellar hemisphere and right occipital lobe as above.  No significant mass effect or associated hemorrhage.   Consider further evaluation with dedicated vascular imaging (i.e.  CTA head and neck).   Modest chronic microvascular ischemic change and small remote left frontal cortical infarct.   Small defect in the frontal lobe (middle frontal gyrus) as above, possibly remote  infarct versus developmental abnormality.   This report was flagged in Epic as abnormal.     Assessment:      1. Hospital discharge follow-up    2. Cerebral infarction due to unspecified occlusion or stenosis of left vertebral artery     3. Cerebrovascular accident (CVA), unspecified mechanism    4. Mixed hyperlipidemia    5. Prediabetes          Plan:      Hospital discharge follow-up     Cerebral infarction due to unspecified occlusion or stenosis of left vertebral artery   -     Ambulatory referral/consult to Cardiology  -     Transesophageal echo (JAVAN); Future     Cerebrovascular accident (CVA), unspecified mechanism     Mixed hyperlipidemia     Prediabetes     Continue ASA, Plavix and Zetia - CVA.  Recommend delaying colonoscopy until work up is complete and he is able to hold plavix-minimum of 3 months, and it may be longer than that. Note to Dr. Sanches.  Schedule JAVAN and loop recorder in [place] to complete CV workup for CVA.  Recommended smaller portions of starches and using whole grain rather than processed grains.  Encouraged exercise; stay active.  Monitor BP at home.  Sodium restriction encouraged.  Follow up with Dr. Alcazar in two months or call sooner for any problems.  Jennie Etienne NP  [facility]

## 2022-02-02 NOTE — BRIEF OP NOTE
<[facility] - Cath Lab ([facility])  Surgery Department  Operative Note    SUMMARY     Date of Procedure: 2/2/2022     Procedure: Procedure(s) (LRB):  INSERTION, IMPLANTABLE LOOP RECORDER (Left)     Surgeon(s) and Role:     * Jairon Barahona MD - Primary    Assisting Surgeon: None    Pre-Operative Diagnosis: Acute CVA (cerebrovascular accident) [I63.9]    Post-Operative Diagnosis: Post-Op Diagnosis Codes:     * Acute CVA (cerebrovascular accident) [I63.9]    Anesthesia: RN IV Sedation    Technical Procedures Used: Loop recorder    Description of the Findings of the Procedure: Loop recorder, Dx: cryptogenic CVA, site L chest    Significant Surgical Tasks Conducted by the Assistant(s), if Applicable: none    Complications: None    Estimated Blood Loss (EBL): < 50 cc           Implants: * No implants in log *    Specimens:   Specimen (24h ago, onward)                None                    Condition: Good    Disposition: PACU - hemodynamically stable.    Attestation: I was present and scrubbed for the entire procedure.

## 2022-02-02 NOTE — DISCHARGE INSTRUCTIONS
Patient Education       Transesophageal Echocardiogram   Why is this procedure done?   An echocardiogram uses sound waves to make moving pictures of your heart. Transesophageal means this test is done from inside of your food pipe or esophagus. This test is also called a JAVAN, which stands for transesophageal echocardiogram. A JAVAN looks at how well your heart works. The doctor is trying to learn about any problems with your heart. The doctor may look at how the blood flows in the heart. The doctor also checks how well the heart pumps. A JAVAN gives a closer and clearer picture of the heart. Your doctor may be looking for problems like:  · Weakening of the heart  · Heart attack  · Problems with the blood vessels that give oxygen to the heart muscle  · Problems with the heart that have been there since birth  · Heart murmurs  · Heart infection  · Swollen area or a tear on a main blood vessel  · Blood clots  · A heart that is bigger than it should be  · Problems with the heart valves  · A tumor  Doctors may use JAVAN if someone has had heart surgery, lung disease, or is very overweight. It may also be used during a cardiac cath.     What will the results be?   The pictures are looked at by a heart doctor. The results may show if there are any problems with your heart.  What happens before the procedure?   · Your doctor will take your history and do an exam. Talk to your doctor about:  ? All the drugs you are taking. Be sure to include all prescription and over-the-counter (OTC) drugs, and herbal supplements. Tell the doctor about any drug allergy. Bring a list of drugs you take with you.  ? When you need to stop eating or drinking before your procedure.  ? If you have ulcers, a hiatal hernia, or problems breathing.  Do not drink beer, wine, and mixed drinks (alcohol) for a few days before the procedure because it may interfere with the sedative used.  You will not be allowed to drive right away after the procedure. Ask a  family member or a friend to drive you home.  What happens during the procedure?   · In the procedure area, the staff will put an IV in your arm to give you fluids and drugs. You will be given a drug to make you sleepy. It will also help you stay pain free during the surgery.  · The staff will attach ECG electrodes on your body to watch your heart rate. They will put a cuff on your arm to check your blood pressure. They will put a probe on your finger to check your oxygen levels during the procedure.  · Your doctor will spray a drug in your throat to numb the area.  · You will be asked to lie on your left side. They may put a small tube in your nose to help you breathe. They will place a tool in your mouth to keep it open during the procedure.  · The doctor will slide the probe down your throat and into your food pipe. You may feel the probe as it moves, but it should not hurt. You should be able to breathe with the probe in place. The doctor will take pictures of your heart from different angles. Then, the probe is taken out.  · This procedure takes less than 60 minutes.  What happens after the procedure?   · You will be moved to a Recovery Room after procedure. The hospital staff will watch your heart rate and breathing until you are stable.  · Talk to your doctor about when the results will be available.  · You may go home after the procedure.  What care is needed at home?   · Ask your doctor what you need to do when you go home. Make sure you ask questions if you do not understand what the doctor says.  · Your throat may feel sore.  · Do not eat or drink anything until the numbness in your throat wears off so that you don't choke. Then you may drink fluids and eat soft foods until your throat feels normal.  · For the next day or so, get lots of rest. Sleep when you are feeling tired. Avoid doing tiring activities.  · Do not drive. Avoid using tools or machines, such as a lawnmower, for at least 1 day after the  procedure.  What follow-up care is needed?   · Your doctor may ask you to make visits to the office to check on your progress. Be sure to keep these visits.  · The results will help your doctor understand what kind of problem you have with your heart. Together you can make a plan for more care.  · Your doctor may send you to a heart specialist if the test results show a problem.  What problems could happen?   · Painful swallowing  · Bleeding  · Problems breathing  · Heart rhythm problems  · Upset stomach  Where can I learn more?   American Heart Association  http://www.heart.org/HEARTORG/Conditions/HeartAttack/SymptomsDiagnosisofHeartAttack/[email or web address]   NHS Choices  https://www.nhs.uk/conditions/echocardiogram/   Last Reviewed Date   2018-10-31  Consumer Information Use and Disclaimer   This information is not specific medical advice and does not replace information you receive from your health care provider. This is only a brief summary of general information. It does NOT include all information about conditions, illnesses, injuries, tests, procedures, treatments, therapies, discharge instructions or life-style choices that may apply to you. You must talk with your health care provider for complete information about your health and treatment options. This information should not be used to decide whether or not to accept your health care providers advice, instructions or recommendations. Only your health care provider has the knowledge and training to provide advice that is right for you.  Copyright   Copyright © 2021 [employer] and its affiliates and/or licensors. All rights reserved.  Patient Education       Moderate and Deep Sedation in Adults   About this topic   Sedation changes a persons level of consciousness or being awake. Doctors give moderate or deep sedation to help you become more comfortable when they need to do a procedure. The staff watch you  closely when you are given sedation. With sedation, you may not remember the procedure when it is over. The level of your sedation may be moderate or deep.  With moderate or conscious sedation, you:  · Will be relaxed and calm.  · May seem to sleep.  · May feel only slight pain or no pain at all.  · May talk and answer questions.  · Breathe on your own.  With deep sedation, you:  · Will be relaxed and calm.  · May seem to sleep.  · May feel only slight pain or no pain at all.  · Are not able talk or answer questions.  · May need help keeping your airway open or breathing.  Sedation is given by someone with special training. This is someone like a:  · Certified registered nurse anesthetist (CRNA)  · Anesthesiologist  · Doctor  · Dentist  · Oral surgeon  Why is this procedure done?   Sedation is most often given for procedures such as:  · Minor surgeries or procedures, like taking a tissue sample or fixing a broken bone  · Colonoscopy  · Vasectomy  · Dental surgery, like an implant or taking out an impacted tooth  · Endoscopy  · Bronchoscopy  · Plastic cosmetic surgery  · Endotracheal procedure  What happens before the procedure?   · Your doctor will take your history and do an exam. Tell the doctor if you have any drug allergy. Talk to the doctor about:  ? All the drugs you are taking. Be sure to include all prescription, over-the-counter, vitamins, and herbal supplements. Bring a list of drugs you take with you.  ? Any bleeding problems. Be sure to tell your doctor if you are taking any drugs that may cause bleeding. Some of these are warfarin, rivaroxaban, apixaban, ticagrelor, clopidogrel, ibuprofen, naproxen, or aspirin. Certain vitamins and herbs, such as garlic and fish oil, may also add to the risk for bleeding. You may need to stop these drugs as well. Talk to your doctor about them.  ? Any previous problems with sedation or anesthesia.  · Talk with the doctor about when you last ate or drank anything.  · You  will not be allowed to drive after the procedure. Plan another way to get home.  What happens after the procedure?   You may feel these side effects:  · Headache  · Upset stomach or throwing up  · Hangover feeling  · Short period of no memory or cloudy memory  · Bad memories  · Confusion or hallucinations  What care is needed at home?   · Do not make major decisions or sign important papers for at least 24 hours. You may not be thinking clearly.  · Do not drive or operate machinery for 24 hours or until OK'd by your doctor.  What problems could happen?   · Low blood pressure  · Breathing problems  · Heart problems  · Allergic reactions  Last Reviewed Date   2021-05-06  Consumer Information Use and Disclaimer   This information is not specific medical advice and does not replace information you receive from your health care provider. This is only a brief summary of general information. It does NOT include all information about conditions, illnesses, injuries, tests, procedures, treatments, therapies, discharge instructions or life-style choices that may apply to you. You must talk with your health care provider for complete information about your health and treatment options. This information should not be used to decide whether or not to accept your health care providers advice, instructions or recommendations. Only your health care provider has the knowledge and training to provide advice that is right for you.  Copyright   Copyright © 2021 [employer]. and its affiliates and/or licensors. All rights reserved.  Patient Education       Implantable Loop Recorder   Why is this procedure done?   An implantable loop recorder or ILR is a special kind of heart monitor. It does not have wires or patches, but it can record your heart rhythm for a few years. Since it records over a longer period than other kinds of heart monitors, your doctor may be able to learn more about problems with your heart rhythm.  Your heart has  an electrical system that controls each heartbeat and signals your heart to pump blood. The signal repeats with each heartbeat. The ILR records this electrical activity and sends it to your doctor.  You may need to have an ILR if your doctor suspects an abnormal heart rhythm does not happen often. It may not have shown up on other tests that only record for a shorter time. This may happen if you have problems like:  · Fainting  · Abnormal heart beats  · Unexplained falls  · Seizures  · Dizziness  What happens before the procedure?   · Your doctor will ask about your health history. Talk to the doctor about:  ? All the drugs you are taking. Be sure to include all prescription and over the counter drugs, vitamins, and herbal supplements. Bring a list of drugs you take with you.  ? Any bleeding problems. Be sure to tell your doctor if you are taking any drugs that may cause bleeding. Some of these are warfarin, rivaroxaban, apixaban, ticagrelor, clopidogrel, ketorolac, ibuprofen, naproxen, or aspirin. Certain vitamins and herbs, such as garlic and fish oil, may also add to the risk for bleeding. You may need to stop these drugs as well. Talk to your doctor about them.  · Tell the doctor if you have any drug allergy.  · Ask a family member or friend to drive you home. You will not be allowed to drive after your procedure.  What happens during the procedure?   · Your doctor may give you something to help you relax.  · The doctor will numb your skin and then make a small cut, most often on your left upper chest.  · The doctor makes a small pocket under the skin and places the ILR in this pocket. The ILR is about the size of a key.  · Your doctor will close your cut and cover it with clean bandages.  What happens after the procedure?   · You will be able to go home after your procedure.  · Your doctor will teach you how your ILR sends recordings. You may need to keep a special transmission monitor near you while you  sleep.  · Your doctor may ask you to write down any symptoms you are having.  What care is needed at home?   · Ask your doctor what you need to do when you go home. Make sure you ask questions if you do not understand everything the doctor says.  · Talk to your doctor about how to care for your cut site. Ask your doctor about:  ? When you should change your bandages.  ? When you may take a bath or shower.  · Be sure to wash your hands before and after touching your wound or bandage.  · After your incision heals, you will not need to do anything special to care for your ILR.  · Talk with your doctor if you are going to have an MRI.  What follow-up care is needed?   Your doctor will ask you to come back to the office to check on your progress. Be sure to keep these visits.  When do I need to call the doctor?   Activate the emergency medical system right away if you have signs of a heart attack. Call 911 in the [place] or [place]. The sooner treatment begins, the better your chances for recovery. Call for emergency help right away if you have:  · Signs of heart attack:  ? Chest pain  ? Trouble breathing  ? Fast heartbeat  ? Feeling dizzy  Last Reviewed Date   2021-08-03  Consumer Information Use and Disclaimer   This information is not specific medical advice and does not replace information you receive from your health care provider. This is only a brief summary of general information. It does NOT include all information about conditions, illnesses, injuries, tests, procedures, treatments, therapies, discharge instructions or life-style choices that may apply to you. You must talk with your health care provider for complete information about your health and treatment options. This information should not be used to decide whether or not to accept your health care providers advice, instructions or recommendations. Only your health care provider has the knowledge and training to provide advice that is right for  you.  Copyright   Copyright © 2021 [employer] and its affiliates and/or licensors. All rights reserved.

## 2022-02-03 ENCOUNTER — TELEPHONE (OUTPATIENT)
Dept: FAMILY MEDICINE | Facility: CLINIC | Age: 67
End: 2022-02-03
Payer: MEDICARE

## 2022-02-03 NOTE — BRIEF OP NOTE
<[facility] - Cath Lab ([facility])  Surgery Department  Operative Note    SUMMARY     Date of Procedure: 2/2/2022     Procedure: Procedure(s) (LRB):  INSERTION, IMPLANTABLE LOOP RECORDER (Left)     Surgeon(s) and Role:     * Jairon Barahona MD - Primary    Assisting Surgeon: None    Pre-Operative Diagnosis: Acute CVA (cerebrovascular accident) [I63.9]    Post-Operative Diagnosis: Post-Op Diagnosis Codes:     * Acute CVA (cerebrovascular accident) [I63.9]    Anesthesia: RN IV Sedation    Technical Procedures Used: LOOP RECORDER    Description of the Findings of the Procedure: LOOP RECORDER, DX: CRYPTOGENIC SYNCOPE    Significant Surgical Tasks Conducted by the Assistant(s), if Applicable: NONE    Complications: No    Estimated Blood Loss (EBL): < 50 cc           Implants:   Implant Name Type Inv. Item Serial No.  Lot No. LRB No. Used Action   [employer] LINQ II MON INSRTBL CARD - [id number] Loop Recorder SYS LINQ II MON INSRTBL CARD [id number] [employer] [facility]  Left 1 Implanted       Specimens:   Specimen (24h ago, onward)            None                  Condition: Good    Disposition: PACU - hemodynamically stable.    Attestation: I was present and scrubbed for the entire procedure.

## 2022-02-03 NOTE — TELEPHONE ENCOUNTER
----- Message from Kathie Navarro sent at 2/3/2022 12:44 PM CST -----  Pt is requesting a call back in regards to medication. Pt can be reached at [phone] (zhuf)

## 2022-02-08 ENCOUNTER — CLINICAL SUPPORT (OUTPATIENT)
Dept: REHABILITATION | Facility: HOSPITAL | Age: 67
End: 2022-02-08
Attending: INTERNAL MEDICINE
Payer: MEDICARE

## 2022-02-08 DIAGNOSIS — Z74.09 IMPAIRED FUNCTIONAL MOBILITY AND ACTIVITY TOLERANCE: ICD-10-CM

## 2022-02-08 PROCEDURE — 97110 THERAPEUTIC EXERCISES: CPT | Mod: PN

## 2022-02-08 PROCEDURE — 97112 NEUROMUSCULAR REEDUCATION: CPT | Mod: PN

## 2022-02-08 NOTE — PROGRESS NOTES
Physical Therapy Daily Treatment Note     Name: Javier Barrera  Clinic Number: [id number]    Therapy Diagnosis:   Encounter Diagnosis   Name Primary?    Impaired functional mobility and activity tolerance      Physician: Henri Devine*    Visit Date: 2/8/2022  Physician Orders: PT Eval and Treat  Medical Diagnosis from Referral: I63.9 (ICD-10-CM) - Cerebrovascular accident (CVA), unspecified mechanism  Evaluation Date: 1/12/2022  Authorization Period Expiration: 12/31/2022  Plan of Care Expiration: 3/8/2022  Visit # / Visits authorized: 3/20 auth (1/1 Eval)  PTA Visit #: 0/5     Time In: 1045 am  Time Out: 1125 am  Total Billable Time: 40 minutes     Precautions: Standard, HTN, [place] Diabetic, Allergic to Penicillin, Hx of CVA, Hx of recent COVID-19, visual deficits    Subjective     Pt reports: he is still having soreness in the back but he has been working much more. His balance and mobility have improved in recent weeks.     He was compliant with home exercise program.  Response to previous treatment: initial evaluation  Functional change: improved overall mobility    Pain: 6/10  Location: Left upper trap; left low back    Objective     Javier received therapeutic exercises to develop strength, endurance, ROM, flexibility, posture and core stabilization for 10 minutes including:     Recumbent bike 5 min lvl 5 - 5 min  Treadmill 2.0 speed 2.0 grade 5 min     Possible next visit: rebounder        Javier participated in neuromuscular re-education activities to improve: Balance, Coordination, Proprioception and Posture for 30 minutes. The following activities were included:     (all performed in // bars)  Tandem walking 5 sets  Retro gait 5 sets  Side stepping, eyes closed 5 sets  Single leg stance 2x30 sec each leg  Airex foam, both feet, eyes closed, no UE support 4x30 sec   Tandem stance looking up/down 1 x20s each  Tandem stance looking side/side 1 x20s each  Tandem walking on foam x4 sets  Lateral  step ups, 8 in, no UE support x10 reps each leg  Chair squats no UE support x30 reps     Home Exercises and Patient Education Provided     Education provided:   - HEP reviewed  - Anatomy and Physiology pertaining to current condition  - Possible response to exercise  - Importance of PT compliance     Written Home Exercises Provided: Patient instructed to continue prior HEP.  Exercises were reviewed and Javier was able to demonstrate them prior to the end of the session.  Javier demonstrated good  understanding of the education provided.      See EMR under Patient Instructions for exercises provided 1/12/2022.    Assessment     Patient was able to progress treadmill by increasing incline and time spent walking. He was also able to begin lateral step ups today. Fatigue was noted at the end of session but he is progressing well toward goals.   Javier Is progressing well towards his goals.   Pt prognosis is Good.     Pt will continue to benefit from skilled outpatient physical therapy to address the deficits listed in the problem list box on initial evaluation, provide pt/family education and to maximize pt's level of independence in the home and community environment.     Pt's spiritual, cultural and educational needs considered and pt agreeable to plan of care and goals.    Anticipated barriers to physical therapy: recent CVA, visual deficits    Goals:  Short Term Goals: 4 weeks   1. Pt will be independent with HEP to facilitate healing and improve outcome measures. (progressing, not met)  2. Pt will increase gross LE/UE strength by at least a half grade. (progressing, not met)  3. Pt will demonstrate improved postural awareness to assist with balance and reduced pain levels in neck. (progressing, not met)  4. Pt will improve FOTO score by 5% or greater. (progressing, not met)     Long Term Goals: 8 weeks   1. Pt will demonstrate proper safety awareness with ambulation, transfers, and ADLs. (progressing, not met)  2. Pt  n/a will demonstrate strength 5/5 in BLE, BUE. (progressing, not met)  3. Pt will demonstrate sufficient balance in single leg stance, tandem stance, compliant surface, and without visual dependency. (progressing, not met)    Plan     Continue PT to progress functional mobility and address limitations.     Gato Falk, PT

## 2022-02-09 NOTE — DISCHARGE SUMMARY
[facility] - Cath Lab (Hospital)  Discharge Summary      Admit Date: 2/2/2022    Discharge Date and Time: 2/2/2022 11:16 AM    Attending Physician: No att. providers found     Reason for Admission: JAVAN and ILR    Procedures Performed: Procedure(s) (LRB):  TRANSESOPHAGEAL ECHOCARDIOGRAM WITH POSSIBLE CARDIOVERSION (JAVAN W/ POSS CARDIOVERSION) (N/A)    Hospital Course (synopsis of major diagnoses, care, treatment, and services provided during the course of the hospital stay): DX: CVA, procedures: JAVAN-nml, ILR placed in left chest area     Goals of Care Treatment Preferences:  Code Status: Full Code      Consults: none    Significant Diagnostic Studies: Labs: All labs within the past 24 hours have been reviewed  Radiology: X-Ray: CXR: X-Ray Chest 1 View (CXR): No results found for this visit on 02/02/22.    Final Diagnoses:    Principal Problem: <principal problem not specified>   Secondary Diagnoses: There are no hospital problems to display for this patient.      Discharged Condition: good    Disposition: Home or Self Care    Follow Up/Patient Instructions:   1-2 weeks  Medications:  Reconciled Home Medications:      Medication List      Ask your doctor about these medications    aspirin 81 MG EC tablet  Commonly known as: ECOTRIN  Take 1 tablet (81 mg total) by mouth once daily.     clopidogreL 75 mg tablet  Commonly known as: PLAVIX  Take 1 tablet (75 mg total) by mouth once daily.     ezetimibe 10 mg tablet  Commonly known as: ZETIA  Take 1 tablet (10 mg total) by mouth every evening.     gabapentin 300 MG capsule  Commonly known as: NEURONTIN  TAKE 2 CAPSULES BY MOUTH EVERY EVENING     HYDROcodone-acetaminophen 5-325 mg per tablet  Commonly known as: NORCO  Take 1 tablet by mouth every 6 (six) hours as needed for Pain.     lisinopriL 20 MG tablet  Commonly known as: PRINIVIL,ZESTRIL  Take 20 mg by mouth once daily.     metFORMIN 500 MG tablet  Commonly known as: GLUCOPHAGE  Take 500 mg by mouth 2 (two) times daily  with meals.     potassium chloride SA 20 MEQ tablet  Commonly known as: K-DUR,KLOR-CON  Take 20 mEq by mouth once daily.          No discharge procedures on file.

## 2022-02-11 ENCOUNTER — HOSPITAL ENCOUNTER (OUTPATIENT)
Dept: CARDIOLOGY | Facility: HOSPITAL | Age: 67
Discharge: HOME | End: 2022-02-11
Attending: INTERNAL MEDICINE
Payer: MEDICARE

## 2022-02-11 DIAGNOSIS — Z45.09 ENCOUNTER FOR LOOP RECORDER CHECK: ICD-10-CM

## 2022-02-11 DIAGNOSIS — I63.9 CEREBROVASCULAR ACCIDENT (CVA), UNSPECIFIED MECHANISM: ICD-10-CM

## 2022-02-11 PROCEDURE — 93285 CARDIAC DEVICE CHECK - IN CLINIC & HOSPITAL: ICD-10-PCS | Mod: 26,,, | Performed by: INTERNAL MEDICINE

## 2022-02-11 PROCEDURE — 93285 PRGRMG DEV EVAL SCRMS IP: CPT | Mod: 26,,, | Performed by: INTERNAL MEDICINE

## 2022-02-15 ENCOUNTER — CLINICAL SUPPORT (OUTPATIENT)
Dept: REHABILITATION | Facility: HOSPITAL | Age: 67
End: 2022-02-15
Payer: MEDICARE

## 2022-02-15 DIAGNOSIS — Z74.09 IMPAIRED FUNCTIONAL MOBILITY AND ACTIVITY TOLERANCE: ICD-10-CM

## 2022-02-15 PROCEDURE — 97110 THERAPEUTIC EXERCISES: CPT | Mod: PN

## 2022-02-15 NOTE — PLAN OF CARE
Physical Therapy Daily Treatment Note     Name: Javier Barrera  Clinic Number: [id number]    Therapy Diagnosis:   Encounter Diagnosis   Name Primary?    Impaired functional mobility and activity tolerance      Physician: Henri Devine*    Visit Date: 2/15/2022  Physician Orders: PT Eval and Treat  Medical Diagnosis from Referral: I63.9 (ICD-10-CM) - Cerebrovascular accident (CVA), unspecified mechanism  Evaluation Date: 1/12/2022  Authorization Period Expiration: 12/31/2022  Plan of Care Expiration: 3/8/2022  Visit # / Visits authorized: 4/20 auth (1/1 Eval)(Reassessed on 2/15/2022)  PTA Visit #: 0/5  FOTO: 2/3     Time In: 0135  Time Out: 0215  Total Billable Time: 40 minutes     Precautions: Standard, HTN, Boderline Diabetic, Allergic to Penicillin, Hx of CVA, Hx of recent COVID-19, visual deficits    Subjective     Pt reports: he has been working a lot more lately and is feeling much better than on initial evaluation date. His main complaint at this time is visual deficits.    He was compliant with home exercise program.  Response to previous treatment: initial evaluation  Functional change: improved overall mobility    Pain: 4/10  Location: Left upper trap; left low back    Objective     CMS Impairment/Limitation/Restriction for FOTO Stroke Survey     Therapist reviewed FOTO scores for Javier Barrera on 2/15/2022.   FOTO documents entered into [employer] - see Media section.     Limitation Score: 42%         Mental status: alert     Posture/ Alignment: Protruded Head  Postural examination/scapula alignment: Rounded shoulder     Sensation:   Light Touch UEs  Intact  Light Touch LEs  Intact  Proprioception: Intact      Dermatomes:    Right Left Comment   L2 intact intact     L3 intact intact     L4 intact intact     L5 intact intact     S1 intact intact     S2 intact intact     Saddle intact intact        Myotomes:    Right Left Comment   Hip flexion (L2-3): 5/5 5/5     Knee extension (L3-4): 5/5 5/5      DF (L4-5): 5/5 5/5     Great Toe Ext (L5-S1): 5/5 5/5     Great Toe Flex (S1-S2): 5/5 5/5        DTR:    Right Left Comment   Patellar (L3-4) 2+ 2+     Achilles (S1) 2+ 2+           Active/Passive ROM (measured in degrees)      UE ROM WNL     LE ROM WNL     Lumbar % Comment   Flexion: 100     Extension: 100      Lat Flex R: 90     Lat Flex L: 90      Rotation R: 100     Rotation L: 100        Upper Extremity Strength  (R) UE   (L) UE     Shoulder Flexion: 5/5 Shoulder Flexion: 5/5   Shoulder Abduction: 5/5 Shoulder Abduction: 5/5   Shoulder Extension: 5/5 Shoulder Extension:    5/5   Elbow Flexion: 5/5 Elbow Flexion: 5/5   Elbow Extension: 5/5 Elbow Extension: 5/5   Wrist Flexion: 5/5 Wrist Flexion: 5/5   Wrist Extension: 5/5 Wrist Extension: 5/5      Lower Extremity Strength  Right LE   Left LE     Hip Flexion: 5/5 Hip Flexion: 5/5   Hip Extension:  5/5 Hip Extension: 5/5   Hip Abduction: 5/5 Hip Abduction: 5/5   Hip Adduction: 5/5 Hip Adduction 5/5   Knee Extension: 5/5 Knee Extension: 5/5   Knee Flexion: 5/5 Knee Flexion: 5/5   Ankle Dorsiflexion: 5/5 Ankle Dorsiflexion: 5/5   Ankle Plantarflexion: 5/5 Ankle Plantarflexion: 5/5      Abdominal Strength: fair, good     Special Tests       Strength (in pounds, setting II one maximum trial):    Left Right    II WNL WNL      Balance Assessment:    Evaluation   Single Limb Stance R LE 5-10 sec  (<10 sec = HIGH FALL RISK)   Single Limb Stance L LE 5-10 sec  (<10 sec = HIGH FALL RISK)        Evaluation   30 second Chair Rise  (adults > 59 y/o) 18 completed with no arms   5 times sit-stand  (adults 18-63 y/o) 6 seconds  >12 sec= fall risk for general elderly  >16 sec= fall risk for Parkinson's disease  >10 sec= balance/vestibular dysfunction (<59 y/o)  >14.2 sec= balance/vestibular dysfunction (>59 y/o)  >12 sec= fall risk for CVA         GAIT DEVIATIONS: Javier amb with decreased lamin.     Endurance Assessment:    Evaluation   Timed Up and Go 6.8 sec  < 20  sec safe for independent transfers, < 30 sec safe for dependent transfers/assist required      Table: Population Norms for TUG    Age  Average TUG    60 - 69 years  8.1 seconds    70 - 79 years  9.2 seconds    80 - 99 years  11.3 seconds       Palpation:  Increased TTP and ROS left upper trap, cervical paraspinals     Pt/family was provided educational information, including: role of PT, goals for PT, scheduling - pt verbalized understanding. Discussed insurance plan with pt.       Javier received therapeutic exercises to develop strength, endurance, ROM, flexibility, posture and core stabilization for 15 minutes including:     (Remaining therex time used for reassessment purposes)    Recumbent bike 5 min lvl 5 - 5 min  Chair squats no UE support x30 reps  Seated lumbar stretch x20  Long sitting HSS 1 min each   Rebounder 3x10 reps          Javier participated in neuromuscular re-education activities to improve: Balance, Coordination, Proprioception and Posture for 25 minutes. The following activities were included:     Single leg stance 2x30 sec each leg  Airex foam, both feet, eyes closed, no UE support 4x30 sec   Tandem stance looking up/down 1 x20s each  Tandem stance looking side/side 1 x20s each  Tandem walking on foam x4 sets  Lateral step ups, 8 in, no UE support x10 reps each leg      Home Exercises and Patient Education Provided     Education provided:   - HEP reviewed  - Anatomy and Physiology pertaining to current condition  - Possible response to exercise  - Importance of PT compliance     Written Home Exercises Provided: Patient instructed to continue prior HEP.  Exercises were reviewed and Javier was able to demonstrate them prior to the end of the session.  Javier demonstrated good  understanding of the education provided.      See EMR under Patient Instructions for exercises provided 1/12/2022.    Assessment     Upon assessment, pt demonstrates improved functional mobility, increased strength, and increased  ROM compared to IE date. He presents with some limitations in SLS and due to visual impairments. He will transition to 1x week and begin updating/transitioning to HEP.   Javier Is progressing well towards his goals.   Pt prognosis is Good.     Pt will continue to benefit from skilled outpatient physical therapy to address the deficits listed in the problem list box on initial evaluation, provide pt/family education and to maximize pt's level of independence in the home and community environment.     Pt's spiritual, cultural and educational needs considered and pt agreeable to plan of care and goals.    Anticipated barriers to physical therapy: recent CVA, visual deficits    Goals:  Short Term Goals: 4 weeks   1. Pt will be independent with HEP to facilitate healing and improve outcome measures. (met)  2. Pt will increase gross LE/UE strength by at least a half grade. (met)  3. Pt will demonstrate improved postural awareness to assist with balance and reduced pain levels in neck. (met)  4. Pt will improve FOTO score by 5% or greater. (progressing, not met)     Long Term Goals: 8 weeks   1. Pt will demonstrate proper safety awareness with ambulation, transfers, and ADLs. (met)  2. Pt will demonstrate strength 5/5 in BLE, BUE. (met)  3. Pt will demonstrate sufficient balance in single leg stance, tandem stance, compliant surface, and without visual dependency. (progressing, not met)    Plan     PT 1x week, transition to HEP     Gato Falk, PT DPT

## 2022-02-15 NOTE — PROGRESS NOTES
Physical Therapy Daily Treatment Note     Name: Javier Barrera  Clinic Number: [id number]    Therapy Diagnosis:   Encounter Diagnosis   Name Primary?    Impaired functional mobility and activity tolerance      Physician: Henri Devine*    Visit Date: 2/15/2022  Physician Orders: PT Eval and Treat  Medical Diagnosis from Referral: I63.9 (ICD-10-CM) - Cerebrovascular accident (CVA), unspecified mechanism  Evaluation Date: 1/12/2022  Authorization Period Expiration: 12/31/2022  Plan of Care Expiration: 3/8/2022  Visit # / Visits authorized: 4/20 auth (1/1 Eval)(Reassessed on 2/15/2022)  PTA Visit #: 0/5  FOTO: 2/3     Time In: 0135  Time Out: 0215  Total Billable Time: 40 minutes     Precautions: Standard, HTN, Boderline Diabetic, Allergic to Penicillin, Hx of CVA, Hx of recent COVID-19, visual deficits    Subjective     Pt reports: he has been working a lot more lately and is feeling much better than on initial evaluation date. His main complaint at this time is visual deficits.    He was compliant with home exercise program.  Response to previous treatment: initial evaluation  Functional change: improved overall mobility    Pain: 4/10  Location: Left upper trap; left low back    Objective     CMS Impairment/Limitation/Restriction for FOTO Stroke Survey     Therapist reviewed FOTO scores for Javier Barrera on 2/15/2022.   FOTO documents entered into [employer] - see Media section.     Limitation Score: 42%         Mental status: alert     Posture/ Alignment: Protruded Head  Postural examination/scapula alignment: Rounded shoulder     Sensation:   Light Touch UEs  Intact  Light Touch LEs  Intact  Proprioception: Intact      Dermatomes:    Right Left Comment   L2 intact intact     L3 intact intact     L4 intact intact     L5 intact intact     S1 intact intact     S2 intact intact     Saddle intact intact        Myotomes:    Right Left Comment   Hip flexion (L2-3): 5/5 5/5     Knee extension (L3-4): 5/5 5/5      DF (L4-5): 5/5 5/5     Great Toe Ext (L5-S1): 5/5 5/5     Great Toe Flex (S1-S2): 5/5 5/5        DTR:    Right Left Comment   Patellar (L3-4) 2+ 2+     Achilles (S1) 2+ 2+           Active/Passive ROM (measured in degrees)      UE ROM WNL     LE ROM WNL     Lumbar % Comment   Flexion: 100     Extension: 100      Lat Flex R: 90     Lat Flex L: 90      Rotation R: 100     Rotation L: 100        Upper Extremity Strength  (R) UE   (L) UE     Shoulder Flexion: 5/5 Shoulder Flexion: 5/5   Shoulder Abduction: 5/5 Shoulder Abduction: 5/5   Shoulder Extension: 5/5 Shoulder Extension:    5/5   Elbow Flexion: 5/5 Elbow Flexion: 5/5   Elbow Extension: 5/5 Elbow Extension: 5/5   Wrist Flexion: 5/5 Wrist Flexion: 5/5   Wrist Extension: 5/5 Wrist Extension: 5/5      Lower Extremity Strength  Right LE   Left LE     Hip Flexion: 5/5 Hip Flexion: 5/5   Hip Extension:  5/5 Hip Extension: 5/5   Hip Abduction: 5/5 Hip Abduction: 5/5   Hip Adduction: 5/5 Hip Adduction 5/5   Knee Extension: 5/5 Knee Extension: 5/5   Knee Flexion: 5/5 Knee Flexion: 5/5   Ankle Dorsiflexion: 5/5 Ankle Dorsiflexion: 5/5   Ankle Plantarflexion: 5/5 Ankle Plantarflexion: 5/5      Abdominal Strength: fair, good     Special Tests       Strength (in pounds, setting II one maximum trial):    Left Right    II WNL WNL      Balance Assessment:    Evaluation   Single Limb Stance R LE 5-10 sec  (<10 sec = HIGH FALL RISK)   Single Limb Stance L LE 5-10 sec  (<10 sec = HIGH FALL RISK)        Evaluation   30 second Chair Rise  (adults > 61 y/o) 18 completed with no arms   5 times sit-stand  (adults 18-65 y/o) 6 seconds  >12 sec= fall risk for general elderly  >16 sec= fall risk for Parkinson's disease  >10 sec= balance/vestibular dysfunction (<61 y/o)  >14.2 sec= balance/vestibular dysfunction (>61 y/o)  >12 sec= fall risk for CVA         GAIT DEVIATIONS: Javier amb with decreased lamin.     Endurance Assessment:    Evaluation   Timed Up and Go 6.8 sec  < 20  sec safe for independent transfers, < 30 sec safe for dependent transfers/assist required      Table: Population Norms for TUG    Age  Average TUG    60 - 69 years  8.1 seconds    70 - 79 years  9.2 seconds    80 - 99 years  11.3 seconds       Palpation:  Increased TTP and ROS left upper trap, cervical paraspinals     Pt/family was provided educational information, including: role of PT, goals for PT, scheduling - pt verbalized understanding. Discussed insurance plan with pt.       Javier received therapeutic exercises to develop strength, endurance, ROM, flexibility, posture and core stabilization for 15 minutes including:     (Remaining therex time used for reassessment purposes)    Recumbent bike 5 min lvl 5 - 5 min  Chair squats no UE support x30 reps  Seated lumbar stretch x20  Long sitting HSS 1 min each   Rebounder 3x10 reps          Javier participated in neuromuscular re-education activities to improve: Balance, Coordination, Proprioception and Posture for 25 minutes. The following activities were included:     Single leg stance 2x30 sec each leg  Airex foam, both feet, eyes closed, no UE support 4x30 sec   Tandem stance looking up/down 1 x20s each  Tandem stance looking side/side 1 x20s each  Tandem walking on foam x4 sets  Lateral step ups, 8 in, no UE support x10 reps each leg      Home Exercises and Patient Education Provided     Education provided:   - HEP reviewed  - Anatomy and Physiology pertaining to current condition  - Possible response to exercise  - Importance of PT compliance     Written Home Exercises Provided: Patient instructed to continue prior HEP.  Exercises were reviewed and Javier was able to demonstrate them prior to the end of the session.  Javier demonstrated good  understanding of the education provided.      See EMR under Patient Instructions for exercises provided 1/12/2022.    Assessment     Upon assessment, pt demonstrates improved functional mobility, increased strength, and increased  ROM compared to IE date. He presents with some limitations in SLS and due to visual impairments. He will transition to 1x week and begin updating/transitioning to HEP.   Javier Is progressing well towards his goals.   Pt prognosis is Good.     Pt will continue to benefit from skilled outpatient physical therapy to address the deficits listed in the problem list box on initial evaluation, provide pt/family education and to maximize pt's level of independence in the home and community environment.     Pt's spiritual, cultural and educational needs considered and pt agreeable to plan of care and goals.    Anticipated barriers to physical therapy: recent CVA, visual deficits    Goals:  Short Term Goals: 4 weeks   1. Pt will be independent with HEP to facilitate healing and improve outcome measures. (met)  2. Pt will increase gross LE/UE strength by at least a half grade. (met)  3. Pt will demonstrate improved postural awareness to assist with balance and reduced pain levels in neck. (met)  4. Pt will improve FOTO score by 5% or greater. (progressing, not met)     Long Term Goals: 8 weeks   1. Pt will demonstrate proper safety awareness with ambulation, transfers, and ADLs. (met)  2. Pt will demonstrate strength 5/5 in BLE, BUE. (met)  3. Pt will demonstrate sufficient balance in single leg stance, tandem stance, compliant surface, and without visual dependency. (progressing, not met)    Plan     PT 1x week, transition to HEP     Gato Falk, PT DPT

## 2022-02-16 DIAGNOSIS — R20.2 NUMBNESS AND TINGLING OF LEFT LEG: ICD-10-CM

## 2022-02-16 DIAGNOSIS — R20.0 NUMBNESS AND TINGLING IN LEFT ARM: ICD-10-CM

## 2022-02-16 DIAGNOSIS — R20.0 NUMBNESS AND TINGLING OF LEFT LEG: ICD-10-CM

## 2022-02-16 DIAGNOSIS — M79.18 MYOFASCIAL PAIN ON LEFT SIDE: ICD-10-CM

## 2022-02-16 DIAGNOSIS — M54.12 CERVICAL RADICULOPATHY: ICD-10-CM

## 2022-02-16 DIAGNOSIS — M54.50 LUMBOSACRAL PAIN: ICD-10-CM

## 2022-02-16 DIAGNOSIS — R20.2 NUMBNESS AND TINGLING IN LEFT ARM: ICD-10-CM

## 2022-02-16 RX ORDER — GABAPENTIN 300 MG/1
600 CAPSULE ORAL NIGHTLY
Qty: 30 CAPSULE | Refills: 0 | Status: SHIPPED | OUTPATIENT
Start: 2022-02-16 | End: 2022-02-18

## 2022-02-16 NOTE — TELEPHONE ENCOUNTER
----- Message from Jen Nation sent at 2/16/2022 10:46 AM CST -----  Contact: Patient  Type:  Patient Returning Call    Who Called:Javier Barrera  Who Left Message for Patient:Tiffanie  Does the patient know what this is regarding?:patient thinks it may be regarding his medication  Would the patient rather a call back or a response via [employer]? Call back  Best Call Back Number:[phone]  Additional Information:

## 2022-02-16 NOTE — TELEPHONE ENCOUNTER
Pt sees pain management on Friday but would like a refill on his Neurontin until he can go for his appt

## 2022-02-18 ENCOUNTER — OFFICE VISIT (OUTPATIENT)
Dept: PAIN MEDICINE | Facility: CLINIC | Age: 67
End: 2022-02-18
Payer: MEDICARE

## 2022-02-18 VITALS
SYSTOLIC BLOOD PRESSURE: 168 MMHG | WEIGHT: 214.94 LBS | HEIGHT: 66 IN | BODY MASS INDEX: 34.55 KG/M2 | HEART RATE: 60 BPM | DIASTOLIC BLOOD PRESSURE: 123 MMHG | RESPIRATION RATE: 17 BRPM

## 2022-02-18 DIAGNOSIS — M51.36 DDD (DEGENERATIVE DISC DISEASE), LUMBAR: Primary | ICD-10-CM

## 2022-02-18 DIAGNOSIS — M79.12 MYALGIA OF AUXILIARY MUSCLES, HEAD AND NECK: ICD-10-CM

## 2022-02-18 DIAGNOSIS — M50.30 DDD (DEGENERATIVE DISC DISEASE), CERVICAL: ICD-10-CM

## 2022-02-18 PROCEDURE — 99999 PR PBB SHADOW E&M-EST. PATIENT-LVL III: CPT | Mod: PBBFAC,,, | Performed by: PHYSICAL MEDICINE & REHABILITATION

## 2022-02-18 PROCEDURE — 99213 OFFICE O/P EST LOW 20 MIN: CPT | Mod: PBBFAC,PO | Performed by: PHYSICAL MEDICINE & REHABILITATION

## 2022-02-18 PROCEDURE — 99999 PR PBB SHADOW E&M-EST. PATIENT-LVL III: ICD-10-PCS | Mod: PBBFAC,,, | Performed by: PHYSICAL MEDICINE & REHABILITATION

## 2022-02-18 PROCEDURE — 99214 OFFICE O/P EST MOD 30 MIN: CPT | Mod: S$PBB,,, | Performed by: PHYSICAL MEDICINE & REHABILITATION

## 2022-02-18 PROCEDURE — 99214 PR OFFICE/OUTPT VISIT, EST, LEVL IV, 30-39 MIN: ICD-10-PCS | Mod: S$PBB,,, | Performed by: PHYSICAL MEDICINE & REHABILITATION

## 2022-02-18 RX ORDER — GABAPENTIN 600 MG/1
600 TABLET ORAL 3 TIMES DAILY
Qty: 90 TABLET | Refills: 11 | Status: SHIPPED | OUTPATIENT
Start: 2022-02-18 | End: 2023-04-13 | Stop reason: SDUPTHER

## 2022-02-18 RX ORDER — HYDROCODONE BITARTRATE AND ACETAMINOPHEN 10; 325 MG/1; MG/1
1 TABLET ORAL EVERY 8 HOURS PRN
Qty: 21 TABLET | Refills: 0 | Status: SHIPPED | OUTPATIENT
Start: 2022-02-18 | End: 2022-02-25

## 2022-02-18 RX ORDER — CYCLOBENZAPRINE HCL 10 MG
TABLET ORAL
Qty: 60 TABLET | Refills: 0 | Status: SHIPPED | OUTPATIENT
Start: 2022-02-18 | End: 2022-04-14

## 2022-02-18 NOTE — PROGRESS NOTES
New Patient Chronic Pain Note (Initial Visit)    Referring Physician: Magui Ac    PCP: Sandy Sanches MD    Chief Complaint:   Chief Complaint   Patient presents with    Low-back Pain        SUBJECTIVE:    Javier Barrera is a 66 y.o. male , right-hand dominant, who presents to the clinic for the evaluation of back pain.  He is self referred.  He has a past medical history of CVA, cervical disc disease, hypertension, hyperlipidemia, dm 2, and multiple other medical comorbidities as listed in his chart.  The pain started years ago following multiple work-related injuries and symptoms have been worsening.The pain is located in the left shoulder area and radiates to the mid back.  He also locates pain to the left lower back with radiation into the left lower extremity.  The pain is described as Aching and is rated as 8/10. The pain is rated with a score of  6/10 on the BEST day and a score of 10/10 on the WORST day.  Symptoms interfere with daily activity. The pain is exacerbated by activities.  The pain is mitigated by nothing. The patient works as an AC technician    Patient denies night fever/night sweats, urinary incontinence, bowel incontinence, significant weight loss, significant motor weakness and loss of sensations.    Pain Disability Index Review:  Last 3 PDI Scores 2/18/2022   Pain Disability Index (PDI) 48       Non-Pharmacologic Treatments:  Physical Therapy/Home Exercise: yes, currently active  Ice/Heat:yes  TENS: no  Acupuncture: no  Massage: no  Chiropractic: no    Other: no      Pain Medications:  - Opioids:  Norco  - Adjuvant Medications:  Gabapentin, Valium  - Anti-Coagulants: Aspirin and Plavix ( Clopidogrel)     report:  Reviewed and consistent with medication use as prescribed.      Pain Procedures:   Denies      Imaging:   X-ray lumbar spine 06/23/2021:  Lumbar spine alignment is within normal limits.  Vertebral body heights are maintained without evidence of acute fracture.   Multilevel degenerative endplate change and anterior marginal osteophyte formation, most pronounced at T11-12 through L1-2.  Marked intervertebral disc space narrowing at L5-S1 with vacuum disc phenomenon.  Moderate disc space narrowing at L3-4 and L4-5.  Multilevel degenerative facet arthropathy.  Aortoiliac calcific atherosclerosis.     X-ray cervical spine 2021:  Bone density appears normal.  The vertebral bodies maintain normal height.  There is no fracture.  There is 2 mm retrolisthesis of C4 on C5 with trace retrolisthesis of C5 on C6.  There is marked disc space narrowing at the C4-5 through C6-7 levels where there is endplate sclerosis and osteophyte formation.  The odontoid process is intact.  There is mild-to-moderate disc space narrowing at the C3-4 level.  The facet joints maintain normal articulation.  There is multilevel bony foraminal stenosis.  The prevertebral soft tissues are normal.  The airway is patent.    X-ray left shoulder 2021:  No acute fracture or dislocation.  No significant soft tissue swelling.  Humeral head normally positioned with the glenoid cavity.  Glenohumeral joint space preserved.   AC joint is intact.  Visualized left lung is clear.    Past Medical History:   Diagnosis Date    Diabetes mellitus, type 2     Hyperlipidemia     Hypertension     Stroke      Past Surgical History:   Procedure Laterality Date    HEMORRHOID SURGERY      INSERTION OF IMPLANTABLE LOOP RECORDER Left 2022    Procedure: INSERTION, IMPLANTABLE LOOP RECORDER;  Surgeon: Jairon Barahona MD;  Location: [facility] CATH LAB;  Service: Cardiology;  Laterality: Left;  scheduled for lukasz and loop implant in [facility] ([facility])     Social History     Socioeconomic History    Marital status:    Tobacco Use    Smoking status: Former Smoker     Quit date: 2001     Years since quittin.1    Smokeless tobacco: Never Used   Substance and Sexual Activity    Alcohol use: Not Currently    Drug  use: Never    Sexual activity: Not Currently     History reviewed. No pertinent family history.    Review of patient's allergies indicates:   Allergen Reactions    Penicillins Swelling    Atorvastatin Other (See Comments)       Current Outpatient Medications   Medication Sig    aspirin (ECOTRIN) 81 MG EC tablet Take 1 tablet (81 mg total) by mouth once daily.    clopidogreL (PLAVIX) 75 mg tablet Take 1 tablet (75 mg total) by mouth once daily.    ezetimibe (ZETIA) 10 mg tablet Take 1 tablet (10 mg total) by mouth every evening.    HYDROcodone-acetaminophen (NORCO) 5-325 mg per tablet Take 1 tablet by mouth every 6 (six) hours as needed for Pain.    lisinopriL (PRINIVIL,ZESTRIL) 20 MG tablet Take 20 mg by mouth once daily.    metFORMIN (GLUCOPHAGE) 500 MG tablet Take 500 mg by mouth 2 (two) times daily with meals.    potassium chloride SA (K-DUR,KLOR-CON) 20 MEQ tablet Take 20 mEq by mouth once daily.    cyclobenzaprine (FLEXERIL) 10 MG tablet Take 1/2 to 1 tab BID PRN muscle spasms. May cause drowsiness.    gabapentin (NEURONTIN) 600 MG tablet Take 1 tablet (600 mg total) by mouth 3 (three) times daily.    HYDROcodone-acetaminophen (NORCO)  mg per tablet Take 1 tablet by mouth every 8 (eight) hours as needed for Pain.     No current facility-administered medications for this visit.       Review of Systems     GENERAL:  No weight loss, malaise or fevers.  HEENT:   No recent changes in vision or hearing  NECK:  Negative for lumps, no difficulty with swallowing.  RESPIRATORY:  Negative for cough, wheezing or shortness of breath, patient denies any recent URI.  CARDIOVASCULAR:  Negative for chest pain, leg swelling or palpitations.  GI:  Negative for abdominal discomfort, blood in stools or black stools or change in bowel habits.  MUSCULOSKELETAL:  See HPI.  SKIN:  Negative for lesions, rash, and itching.  PSYCH:  No mood disorder or recent psychosocial stressors.  Patients sleep is not disturbed  "secondary to pain.  HEMATOLOGY/LYMPHOLOGY:  Negative for prolonged bleeding, bruising easily or swollen nodes.  Patient is currently taking anti-coagulants-aspirin and Plavix  NEURO:   No history of headaches, syncope, paralysis, seizures or tremors.  All other reviewed and negative other than HPI.    OBJECTIVE:    BP (!) 168/123   Pulse 60   Resp 17   Ht 5' 6" (1.676 m)   Wt 97.5 kg (214 lb 15.2 oz)   BMI 34.69 kg/m²         Physical Exam    GENERAL: Well appearing, in no acute distress, alert and oriented x3.  PSYCH:  Mood and affect appropriate.  SKIN: Skin color, texture, turgor normal, no rashes or lesions.  HEAD/FACE:  Normocephalic, atraumatic. Cranial nerves grossly intact.  NECK:  Moderate pain to palpation over the cervical paraspinous muscles. Spurling equivocal.  Moderate pain with neck flexion, extension, and lateral flexion.   CV: RRR with palpation of the radial artery.  PULM: No evidence of respiratory difficulty, symmetric chest rise.  GI:  Soft and non-tender.  BACK: Straight leg raising in the sitting and supine positions is negative to radicular pain.  Moderate pain to palpation over the facet joints of the lumbar spine and lumbar paraspinals.  Limited range of motion secondary to pain reproduction.  EXTREMITIES: Peripheral joint ROM is full and pain free without obvious instability or laxity in all four extremities. No deformities, edema, or skin discoloration. Good capillary refill.  MUSCULOSKELETAL: Shoulder, hip, and knee provocative maneuvers are unremarkable.  There is no pain with palpation over the sacroiliac joints bilaterally.  FABERs test is negative.  FADIRs test is negative.   Bilateral upper and lower extremity strength is normal and symmetric.  No atrophy or tone abnormalities are noted.  NEURO: Bilateral upper and lower extremity coordination and muscle stretch reflexes are physiologic and symmetric.  Plantar response are downgoing. No clonus.  No loss of sensation is " noted.  GAIT: normal.      LABS:  Lab Results   Component Value Date    WBC 8.90 02/02/2022    HGB 13.0 (L) 02/02/2022    HCT 40.4 02/02/2022    MCV 96 02/02/2022     02/02/2022       CMP  Sodium   Date Value Ref Range Status   02/02/2022 139 136 - 145 mmol/L Final     Potassium   Date Value Ref Range Status   02/02/2022 5.3 (H) 3.5 - 5.1 mmol/L Final     Chloride   Date Value Ref Range Status   02/02/2022 105 95 - 110 mmol/L Final     CO2   Date Value Ref Range Status   02/02/2022 25 23 - 29 mmol/L Final     Glucose   Date Value Ref Range Status   02/02/2022 113 (H) 70 - 110 mg/dL Final     BUN   Date Value Ref Range Status   02/02/2022 22 8 - 23 mg/dL Final     Creatinine   Date Value Ref Range Status   02/02/2022 1.2 0.5 - 1.4 mg/dL Final     Calcium   Date Value Ref Range Status   02/02/2022 9.5 8.7 - 10.5 mg/dL Final     Total Protein   Date Value Ref Range Status   01/06/2022 6.7 6.0 - 8.4 g/dL Final     Albumin   Date Value Ref Range Status   01/06/2022 3.2 (L) 3.5 - 5.2 g/dL Final     Total Bilirubin   Date Value Ref Range Status   01/06/2022 0.4 0.1 - 1.0 mg/dL Final     Comment:     For infants and newborns, interpretation of results should be based  on gestational age, weight and in agreement with clinical  observations.    Premature Infant recommended reference ranges:  Up to 24 hours.............<8.0 mg/dL  Up to 48 hours............<12.0 mg/dL  3-5 days..................<15.0 mg/dL  6-29 days.................<15.0 mg/dL       Alkaline Phosphatase   Date Value Ref Range Status   01/06/2022 66 55 - 135 U/L Final     AST   Date Value Ref Range Status   01/06/2022 25 10 - 40 U/L Final     ALT   Date Value Ref Range Status   01/06/2022 26 10 - 44 U/L Final     Anion Gap   Date Value Ref Range Status   02/02/2022 9 8 - 16 mmol/L Final     eGFR if    Date Value Ref Range Status   02/02/2022 >60 >60 mL/min/1.73 m^2 Final     eGFR if non    Date Value Ref Range Status    02/02/2022 >60 >60 mL/min/1.73 m^2 Final     Comment:     Calculation used to obtain the estimated glomerular filtration  rate (eGFR) is the CKD-EPI equation.          Lab Results   Component Value Date    HGBA1C 6.1 (H) 01/05/2022             ASSESSMENT: 66 y.o. year old male with neck and lower back pain, consistent with     1. DDD (degenerative disc disease), lumbar     2. Myalgia of auxiliary muscles, head and neck     3. DDD (degenerative disc disease), cervical           PLAN:   - Interventions: None at this time.  Recent CVA and like to avoid holding blood thinners    - Anticoagulation use: yes aspirin and Plavix    - Medications: I have stressed the importance of physical activity and a home exercise plan to help with pain and improve health. and Patient can continue with medications for now since they are providing benefits, using them appropriately, and without side effects.     Provide Flexeril 5-10 mg b.i.d. p.r.n.  Provide gabapentin with titration schedule to 600 mg t.i.d.  Provide Norco 10/325 mg Q 8 p.r.n., 21 tablets, 0 refills.  I reviewed the  is consistent patient's history and there is no aberrant drug behavior.    - Therapy:  Advised patient continue with activities as tolerated and with current physical therapy prescription    - Labs:  Reviewed    - Imaging: Reviewed available imaging with patient and answered any questions they had regarding study.    - Consults/Referrals:  None at this time    - Records:  Reviewed/Obtain old records from outside physicians and imaging    - Follow up visit: return to clinic in 8 weeks or as needed    - Counseled patient regarding the importance of activity modification and physical therapy    - This condition does not require this patient to take time off of work, and the primary goal of our Pain Management services is to improve the patient's functional capacity.    - Patient Questions: Answered all of the patient's questions regarding diagnosis,  therapy, and treatment        The above plan and management options were discussed at length with patient. Patient is in agreement with the above and verbalized understanding.    I discussed the goals of interventional chronic pain management with the patient on today's visit.  I explained the utility of injections for diagnostic and therapeutic purposes.  We discussed a multimodal approach to pain including treating the patient's given worst pain at any given time.  We will use a systematic approach to addressing pain.  We will also adopt a multimodal approach that includes injections, adjuvant medications, physical therapy, at times psychiatry.  There may be a limited role for opioid use intermittently in the treatment of pain, more particularly for acute pain although no one approach can be used as a sole treatment modality.    I emphasized the importance of regular exercise, core strengthening and stretching, diet and weight loss as a cornerstone of long-term pain management.      Kaden Palencia MD  Interventional Pain Management  [facility]    Disclaimer:  This note was prepared using voice recognition system and is likely to have sound alike errors that may have been overlooked even after proof reading.  Please call me with any questions

## 2022-03-04 ENCOUNTER — CLINICAL SUPPORT (OUTPATIENT)
Dept: CARDIOLOGY | Facility: HOSPITAL | Age: 67
End: 2022-03-04
Payer: MEDICARE

## 2022-03-04 DIAGNOSIS — Z95.818 PRESENCE OF OTHER CARDIAC IMPLANTS AND GRAFTS: ICD-10-CM

## 2022-03-04 PROCEDURE — 93298 CARDIAC DEVICE CHECK - REMOTE: ICD-10-PCS | Mod: ,,, | Performed by: INTERNAL MEDICINE

## 2022-03-04 PROCEDURE — G2066 INTER DEVC REMOTE 30D: HCPCS | Performed by: INTERNAL MEDICINE

## 2022-03-04 PROCEDURE — 93298 REM INTERROG DEV EVAL SCRMS: CPT | Mod: ,,, | Performed by: INTERNAL MEDICINE

## 2022-03-17 ENCOUNTER — PES CALL (OUTPATIENT)
Dept: ADMINISTRATIVE | Facility: CLINIC | Age: 67
End: 2022-03-17
Payer: MEDICARE

## 2022-04-01 ENCOUNTER — TELEPHONE (OUTPATIENT)
Dept: ADMINISTRATIVE | Facility: HOSPITAL | Age: 67
End: 2022-04-01
Payer: MEDICARE

## 2022-04-01 DIAGNOSIS — Z12.11 COLON CANCER SCREENING: Primary | ICD-10-CM

## 2022-04-01 NOTE — TELEPHONE ENCOUNTER
Case Request for Endo has been canceled and new Referral for Endo Procedure  has been entered for Screening Colonoscopy

## 2022-04-03 ENCOUNTER — CLINICAL SUPPORT (OUTPATIENT)
Dept: CARDIOLOGY | Facility: HOSPITAL | Age: 67
End: 2022-04-03
Payer: MEDICARE

## 2022-04-03 DIAGNOSIS — Z95.818 PRESENCE OF OTHER CARDIAC IMPLANTS AND GRAFTS: ICD-10-CM

## 2022-04-03 PROCEDURE — G2066 INTER DEVC REMOTE 30D: HCPCS | Performed by: INTERNAL MEDICINE

## 2022-04-21 ENCOUNTER — TELEPHONE (OUTPATIENT)
Dept: PAIN MEDICINE | Facility: CLINIC | Age: 67
End: 2022-04-21
Payer: MEDICARE

## 2022-05-03 ENCOUNTER — CLINICAL SUPPORT (OUTPATIENT)
Dept: CARDIOLOGY | Facility: HOSPITAL | Age: 67
End: 2022-05-03
Payer: MEDICARE

## 2022-05-03 DIAGNOSIS — Z95.818 PRESENCE OF OTHER CARDIAC IMPLANTS AND GRAFTS: ICD-10-CM

## 2022-05-03 PROCEDURE — 93298 CARDIAC DEVICE CHECK - REMOTE: ICD-10-PCS | Mod: ,,, | Performed by: INTERNAL MEDICINE

## 2022-05-03 PROCEDURE — 93298 REM INTERROG DEV EVAL SCRMS: CPT | Mod: ,,, | Performed by: INTERNAL MEDICINE

## 2022-05-30 ENCOUNTER — PATIENT MESSAGE (OUTPATIENT)
Dept: FAMILY MEDICINE | Facility: CLINIC | Age: 67
End: 2022-05-30
Payer: MEDICARE

## 2022-06-02 ENCOUNTER — CLINICAL SUPPORT (OUTPATIENT)
Dept: CARDIOLOGY | Facility: HOSPITAL | Age: 67
End: 2022-06-02
Payer: MEDICARE

## 2022-06-02 DIAGNOSIS — Z95.818 PRESENCE OF OTHER CARDIAC IMPLANTS AND GRAFTS: ICD-10-CM

## 2022-06-07 RX ORDER — LISINOPRIL 20 MG/1
TABLET ORAL
Qty: 30 TABLET | Refills: 0 | Status: SHIPPED | OUTPATIENT
Start: 2022-06-07 | End: 2022-07-26

## 2022-07-02 ENCOUNTER — CLINICAL SUPPORT (OUTPATIENT)
Dept: CARDIOLOGY | Facility: HOSPITAL | Age: 67
End: 2022-07-02
Payer: MEDICARE

## 2022-07-02 DIAGNOSIS — Z95.818 PRESENCE OF OTHER CARDIAC IMPLANTS AND GRAFTS: ICD-10-CM

## 2022-07-02 PROCEDURE — 93298 CARDIAC DEVICE CHECK - REMOTE: ICD-10-PCS | Mod: ,,, | Performed by: INTERNAL MEDICINE

## 2022-07-02 PROCEDURE — 93298 REM INTERROG DEV EVAL SCRMS: CPT | Mod: ,,, | Performed by: INTERNAL MEDICINE

## 2022-07-09 RX ORDER — EZETIMIBE 10 MG/1
TABLET ORAL
Qty: 30 TABLET | Refills: 3 | Status: SHIPPED | OUTPATIENT
Start: 2022-07-09 | End: 2023-04-13 | Stop reason: SDUPTHER

## 2022-07-26 RX ORDER — LISINOPRIL 20 MG/1
TABLET ORAL
Qty: 30 TABLET | Refills: 3 | Status: SHIPPED | OUTPATIENT
Start: 2022-07-26 | End: 2023-04-13 | Stop reason: SDUPTHER

## 2022-08-01 ENCOUNTER — CLINICAL SUPPORT (OUTPATIENT)
Dept: CARDIOLOGY | Facility: HOSPITAL | Age: 67
End: 2022-08-01
Payer: MEDICARE

## 2022-08-01 DIAGNOSIS — Z95.818 PRESENCE OF OTHER CARDIAC IMPLANTS AND GRAFTS: ICD-10-CM

## 2022-08-31 ENCOUNTER — CLINICAL SUPPORT (OUTPATIENT)
Dept: CARDIOLOGY | Facility: HOSPITAL | Age: 67
End: 2022-08-31
Payer: MEDICARE

## 2022-08-31 DIAGNOSIS — Z95.818 PRESENCE OF OTHER CARDIAC IMPLANTS AND GRAFTS: ICD-10-CM

## 2022-08-31 PROCEDURE — 93298 REM INTERROG DEV EVAL SCRMS: CPT | Mod: ,,, | Performed by: INTERNAL MEDICINE

## 2022-08-31 PROCEDURE — 93298 CARDIAC DEVICE CHECK - REMOTE: ICD-10-PCS | Mod: ,,, | Performed by: INTERNAL MEDICINE

## 2022-10-20 ENCOUNTER — TELEPHONE (OUTPATIENT)
Dept: CARDIOLOGY | Facility: HOSPITAL | Age: 67
End: 2022-10-20
Payer: MEDICARE

## 2022-11-08 ENCOUNTER — PATIENT OUTREACH (OUTPATIENT)
Dept: ADMINISTRATIVE | Facility: HOSPITAL | Age: 67
End: 2022-11-08
Payer: MEDICARE

## 2022-11-08 NOTE — PROGRESS NOTES
HTN Report: Attempted to contact the patient to obtain a home BP reading or schedule office visit to have BP checked, no answer, left voicemail.

## 2022-11-12 ENCOUNTER — CLINICAL SUPPORT (OUTPATIENT)
Dept: CARDIOLOGY | Facility: HOSPITAL | Age: 67
End: 2022-11-12
Payer: MEDICARE

## 2022-11-12 DIAGNOSIS — Z95.818 PRESENCE OF OTHER CARDIAC IMPLANTS AND GRAFTS: ICD-10-CM

## 2022-11-12 PROCEDURE — 93298 CARDIAC DEVICE CHECK - REMOTE: ICD-10-PCS | Mod: ,,, | Performed by: INTERNAL MEDICINE

## 2022-11-12 PROCEDURE — 93298 REM INTERROG DEV EVAL SCRMS: CPT | Mod: ,,, | Performed by: INTERNAL MEDICINE

## 2022-12-12 ENCOUNTER — CLINICAL SUPPORT (OUTPATIENT)
Dept: CARDIOLOGY | Facility: HOSPITAL | Age: 67
End: 2022-12-12
Payer: MEDICARE

## 2022-12-12 DIAGNOSIS — Z95.818 PRESENCE OF OTHER CARDIAC IMPLANTS AND GRAFTS: ICD-10-CM

## 2023-01-30 ENCOUNTER — TELEPHONE (OUTPATIENT)
Dept: CARDIOLOGY | Facility: HOSPITAL | Age: 68
End: 2023-01-30
Payer: MEDICARE

## 2023-01-30 DIAGNOSIS — Z95.818 PRESENCE OF OTHER CARDIAC IMPLANTS AND GRAFTS: Primary | ICD-10-CM

## 2023-01-30 DIAGNOSIS — I63.9 ACUTE CVA (CEREBROVASCULAR ACCIDENT): ICD-10-CM

## 2023-01-30 DIAGNOSIS — I63.9 CEREBROVASCULAR ACCIDENT (CVA), UNSPECIFIED MECHANISM: ICD-10-CM

## 2023-01-30 NOTE — TELEPHONE ENCOUNTER
Attempted to reach patient to set up appoint for Loop recorder check.Left message to contact our office.

## 2023-01-31 ENCOUNTER — TELEPHONE (OUTPATIENT)
Dept: CARDIOLOGY | Facility: HOSPITAL | Age: 68
End: 2023-01-31
Payer: MEDICARE

## 2023-01-31 NOTE — TELEPHONE ENCOUNTER
Attempted to reach patient at both numbers listed and left a message regarding his Loop recorder appointment.No answer was only able to leave a message at both numbers listed.Attempt was made yesterday as well.  
No

## 2023-02-27 ENCOUNTER — TELEPHONE (OUTPATIENT)
Dept: CARDIOLOGY | Facility: HOSPITAL | Age: 68
End: 2023-02-27
Payer: MEDICARE

## 2023-02-27 NOTE — TELEPHONE ENCOUNTER
Attempted to reach the patient at both numbers listed and no answer,Left message regarding upcoming appointment on Monday at 1:30pm for a pacemaker check on both numbers listed.

## 2023-02-28 ENCOUNTER — TELEPHONE (OUTPATIENT)
Dept: CARDIOLOGY | Facility: HOSPITAL | Age: 68
End: 2023-02-28
Payer: MEDICARE

## 2023-02-28 NOTE — TELEPHONE ENCOUNTER
Attempted to reach patient again regarding his Pacemaker check appointment and no answer.Left message regarding appointment again.

## 2023-03-08 ENCOUNTER — TELEPHONE (OUTPATIENT)
Dept: FAMILY MEDICINE | Facility: CLINIC | Age: 68
End: 2023-03-08
Payer: MEDICARE

## 2023-03-08 ENCOUNTER — PATIENT MESSAGE (OUTPATIENT)
Dept: FAMILY MEDICINE | Facility: CLINIC | Age: 68
End: 2023-03-08
Payer: MEDICARE

## 2023-03-08 NOTE — TELEPHONE ENCOUNTER
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 Sanches prescribed. Pt is requesting her office give him a call on Monday when he will be home to get refills/appts scheduled.     Thanks,  Tsering Pal

## 2023-03-13 ENCOUNTER — TELEPHONE (OUTPATIENT)
Dept: CARDIOLOGY | Facility: HOSPITAL | Age: 68
End: 2023-03-13
Payer: MEDICARE

## 2023-03-13 NOTE — TELEPHONE ENCOUNTER
Spoke with patient.He works [place] on a [employer] and is home for two weeks.Needs device check that he has missed on several attempts to get checked.Contacting [employer] Rep to see if they can come this week.

## 2023-03-15 ENCOUNTER — CLINICAL SUPPORT (OUTPATIENT)
Dept: CARDIOLOGY | Facility: HOSPITAL | Age: 68
End: 2023-03-15
Attending: INTERNAL MEDICINE
Payer: MEDICARE

## 2023-03-15 DIAGNOSIS — I63.9 CEREBROVASCULAR ACCIDENT (CVA), UNSPECIFIED MECHANISM: ICD-10-CM

## 2023-03-15 DIAGNOSIS — Z95.818 PRESENCE OF OTHER CARDIAC IMPLANTS AND GRAFTS: ICD-10-CM

## 2023-03-15 DIAGNOSIS — I63.9 ACUTE CVA (CEREBROVASCULAR ACCIDENT): ICD-10-CM

## 2023-03-15 PROCEDURE — 99999 PR PBB SHADOW E&M-EST. PATIENT-LVL I: CPT | Mod: PBBFAC,,,

## 2023-03-15 PROCEDURE — 99999 PR PBB SHADOW E&M-EST. PATIENT-LVL I: ICD-10-PCS | Mod: PBBFAC,,,

## 2023-03-15 PROCEDURE — 93291 INTERROG DEV EVAL SCRMS IP: CPT | Mod: 26,,, | Performed by: INTERNAL MEDICINE

## 2023-03-15 PROCEDURE — 93291 INTERROG DEV EVAL SCRMS IP: CPT | Mod: PO

## 2023-03-15 PROCEDURE — 93291 CARDIAC DEVICE CHECK - IN CLINIC & HOSPITAL: ICD-10-PCS | Mod: 26,,, | Performed by: INTERNAL MEDICINE

## 2023-03-15 PROCEDURE — 99211 OFF/OP EST MAY X REQ PHY/QHP: CPT | Mod: PBBFAC,PO

## 2023-03-31 ENCOUNTER — CLINICAL SUPPORT (OUTPATIENT)
Dept: CARDIOLOGY | Facility: HOSPITAL | Age: 68
End: 2023-03-31
Payer: MEDICARE

## 2023-03-31 DIAGNOSIS — Z95.818 PRESENCE OF OTHER CARDIAC IMPLANTS AND GRAFTS: ICD-10-CM

## 2023-03-31 PROCEDURE — 93298 REM INTERROG DEV EVAL SCRMS: CPT | Mod: ,,, | Performed by: INTERNAL MEDICINE

## 2023-03-31 PROCEDURE — 93298 CARDIAC DEVICE CHECK - REMOTE: ICD-10-PCS | Mod: ,,, | Performed by: INTERNAL MEDICINE

## 2023-04-06 ENCOUNTER — PATIENT OUTREACH (OUTPATIENT)
Dept: ADMINISTRATIVE | Facility: HOSPITAL | Age: 68
End: 2023-04-06
Payer: MEDICARE

## 2023-04-06 NOTE — PROGRESS NOTES
Blood Pressure Report: Called Pt to obtain home blood pressure reading & or schedule Primary Care Visit. Pt says schedule me first available, Pt accepts 04/13/2023 and will be FASTING. Says had last colonoscopy with Getachew Barton  [phone](med.rec) which closed in 2013.

## 2023-04-12 DIAGNOSIS — I10 PRIMARY HYPERTENSION: ICD-10-CM

## 2023-04-13 ENCOUNTER — OFFICE VISIT (OUTPATIENT)
Dept: FAMILY MEDICINE | Facility: CLINIC | Age: 68
End: 2023-04-13
Payer: MEDICARE

## 2023-04-13 ENCOUNTER — HOSPITAL ENCOUNTER (OUTPATIENT)
Dept: RADIOLOGY | Facility: HOSPITAL | Age: 68
Discharge: HOME | End: 2023-04-13
Attending: STUDENT IN AN ORGANIZED HEALTH CARE EDUCATION/TRAINING PROGRAM
Payer: MEDICARE

## 2023-04-13 VITALS
DIASTOLIC BLOOD PRESSURE: 86 MMHG | HEIGHT: 66 IN | TEMPERATURE: 98 F | SYSTOLIC BLOOD PRESSURE: 154 MMHG | WEIGHT: 210 LBS | OXYGEN SATURATION: 99 % | HEART RATE: 75 BPM | BODY MASS INDEX: 33.75 KG/M2 | RESPIRATION RATE: 16 BRPM

## 2023-04-13 DIAGNOSIS — E55.9 VITAMIN D DEFICIENCY: ICD-10-CM

## 2023-04-13 DIAGNOSIS — Z12.5 ENCOUNTER FOR SCREENING FOR MALIGNANT NEOPLASM OF PROSTATE: ICD-10-CM

## 2023-04-13 DIAGNOSIS — Z13.6 ENCOUNTER FOR ABDOMINAL AORTIC ANEURYSM (AAA) SCREENING: ICD-10-CM

## 2023-04-13 DIAGNOSIS — R73.03 PREDIABETES: ICD-10-CM

## 2023-04-13 DIAGNOSIS — Z23 NEED FOR VACCINATION FOR STREP PNEUMONIAE: ICD-10-CM

## 2023-04-13 DIAGNOSIS — I10 PRIMARY HYPERTENSION: ICD-10-CM

## 2023-04-13 DIAGNOSIS — Z12.11 COLON CANCER SCREENING: ICD-10-CM

## 2023-04-13 DIAGNOSIS — M25.532 LEFT WRIST PAIN: ICD-10-CM

## 2023-04-13 DIAGNOSIS — I70.0 AORTIC ATHEROSCLEROSIS: Primary | ICD-10-CM

## 2023-04-13 DIAGNOSIS — E78.2 MIXED HYPERLIPIDEMIA: ICD-10-CM

## 2023-04-13 DIAGNOSIS — Z00.00 ANNUAL PHYSICAL EXAM: ICD-10-CM

## 2023-04-13 PROBLEM — I63.9 ACUTE CVA (CEREBROVASCULAR ACCIDENT): Status: RESOLVED | Noted: 2022-01-05 | Resolved: 2023-04-13

## 2023-04-13 PROBLEM — U07.1 COVID-19 VIRUS DETECTED: Status: RESOLVED | Noted: 2022-01-06 | Resolved: 2023-04-13

## 2023-04-13 PROBLEM — N17.9 AKI (ACUTE KIDNEY INJURY): Status: RESOLVED | Noted: 2022-01-05 | Resolved: 2023-04-13

## 2023-04-13 PROCEDURE — 99999 PR PBB SHADOW E&M-EST. PATIENT-LVL V: CPT | Mod: PBBFAC,,, | Performed by: STUDENT IN AN ORGANIZED HEALTH CARE EDUCATION/TRAINING PROGRAM

## 2023-04-13 PROCEDURE — 99215 OFFICE O/P EST HI 40 MIN: CPT | Mod: PBBFAC,PO | Performed by: STUDENT IN AN ORGANIZED HEALTH CARE EDUCATION/TRAINING PROGRAM

## 2023-04-13 PROCEDURE — 73110 X-RAY EXAM OF WRIST: CPT | Mod: TC,PO,LT

## 2023-04-13 PROCEDURE — 99397 PER PM REEVAL EST PAT 65+ YR: CPT | Mod: S$PBB,GZ,, | Performed by: STUDENT IN AN ORGANIZED HEALTH CARE EDUCATION/TRAINING PROGRAM

## 2023-04-13 PROCEDURE — 73110 X-RAY EXAM OF WRIST: CPT | Mod: 26,LT,, | Performed by: RADIOLOGY

## 2023-04-13 PROCEDURE — 73110 XR WRIST COMPLETE 3 VIEWS LEFT: ICD-10-PCS | Mod: 26,LT,, | Performed by: RADIOLOGY

## 2023-04-13 PROCEDURE — 99999 PR PBB SHADOW E&M-EST. PATIENT-LVL V: ICD-10-PCS | Mod: PBBFAC,,, | Performed by: STUDENT IN AN ORGANIZED HEALTH CARE EDUCATION/TRAINING PROGRAM

## 2023-04-13 PROCEDURE — 99397 PR PREVENTIVE VISIT,EST,65 & OVER: ICD-10-PCS | Mod: S$PBB,GZ,, | Performed by: STUDENT IN AN ORGANIZED HEALTH CARE EDUCATION/TRAINING PROGRAM

## 2023-04-13 PROCEDURE — 90677 PCV20 VACCINE IM: CPT | Mod: PBBFAC,PO

## 2023-04-13 RX ORDER — GABAPENTIN 600 MG/1
600 TABLET ORAL 3 TIMES DAILY
Qty: 90 TABLET | Refills: 11 | Status: SHIPPED | OUTPATIENT
Start: 2023-04-13 | End: 2024-04-12

## 2023-04-13 RX ORDER — CLOPIDOGREL BISULFATE 75 MG/1
75 TABLET ORAL DAILY
Qty: 90 TABLET | Refills: 0 | Status: SHIPPED | OUTPATIENT
Start: 2023-04-13 | End: 2023-04-13

## 2023-04-13 RX ORDER — CYCLOBENZAPRINE HCL 10 MG
10 TABLET ORAL 3 TIMES DAILY
Qty: 60 TABLET | Refills: 3 | Status: SHIPPED | OUTPATIENT
Start: 2023-04-13

## 2023-04-13 RX ORDER — LISINOPRIL 20 MG/1
20 TABLET ORAL DAILY
Qty: 90 TABLET | Refills: 3 | Status: SHIPPED | OUTPATIENT
Start: 2023-04-13 | End: 2023-04-13

## 2023-04-13 RX ORDER — EZETIMIBE 10 MG/1
10 TABLET ORAL NIGHTLY
Qty: 90 TABLET | Refills: 3 | Status: SHIPPED | OUTPATIENT
Start: 2023-04-13 | End: 2023-04-13

## 2023-04-13 RX ORDER — CLOPIDOGREL BISULFATE 75 MG/1
75 TABLET ORAL DAILY
Qty: 90 TABLET | Refills: 0 | Status: SHIPPED | OUTPATIENT
Start: 2023-04-13 | End: 2024-04-12

## 2023-04-13 RX ORDER — METFORMIN HYDROCHLORIDE 500 MG/1
500 TABLET ORAL 2 TIMES DAILY
Qty: 60 TABLET | Refills: 0 | Status: SHIPPED | OUTPATIENT
Start: 2023-04-13 | End: 2023-05-11

## 2023-04-13 RX ORDER — EZETIMIBE 10 MG/1
10 TABLET ORAL NIGHTLY
Qty: 90 TABLET | Refills: 3 | Status: SHIPPED | OUTPATIENT
Start: 2023-04-13

## 2023-04-13 RX ORDER — LISINOPRIL 20 MG/1
20 TABLET ORAL DAILY
Qty: 90 TABLET | Refills: 3 | Status: SHIPPED | OUTPATIENT
Start: 2023-04-13

## 2023-04-13 NOTE — PROGRESS NOTES
I have sent a msg to patient with the following interpretation (see below):    XR L WRIST - arthritis. no fractures    As needed wrist brace     Please do not hesitate to call or message with any questions or concerns    Sandy Sanches MD

## 2023-04-13 NOTE — PROGRESS NOTES
Problem List Items Addressed This Visit          Cardiac/Vascular    Mixed hyperlipidemia    Overview     Intolerant of statins; states he is not interested in trying other statin options    -chronic condition. Currently stable.    -patient reports compliance with hyperlipidemia treatment as prescribed  - due for repeat labs, ordered      Hyperlipidemia Medications               ezetimibe (ZETIA) 10 mg tablet Take 1 tablet (10 mg total) by mouth every evening.          -denies side effects of medications, including any myalgias, CP, SOB, abdominal pain, N/V, C/D at this time  -recent labs listed below:    Lab Results   Component Value Date    CHOL 151 01/05/2022     Lab Results   Component Value Date    HDL 26 (L) 01/05/2022     Lab Results   Component Value Date    LDLCALC 90.6 01/05/2022     Lab Results   Component Value Date    TRIG 172 (H) 01/05/2022              Relevant Medications    clopidogreL (PLAVIX) 75 mg tablet    ezetimibe (ZETIA) 10 mg tablet    lisinopriL (PRINIVIL,ZESTRIL) 20 MG tablet    Other Relevant Orders    PSA, Screening    Comprehensive Metabolic Panel    Microalbumin/Creatinine Ratio, Urine    CBC Auto Differential    TSH    Lipid Panel    Hemoglobin A1C    US AAA Screening    Aortic atherosclerosis - Primary    Overview     Statin intolerance, on Zetia             Relevant Orders    Comprehensive Metabolic Panel    CBC Auto Differential    Lipid Panel    Primary hypertension    Overview     - above goal today,asx  Has been out of meds for several months in [place]  Please come back to clinic for 2 week nurse visit to check your blood pressure.   Check BP daily at home, log it, and bring log with you for nurse visit.  Also bring in your home cuff to nurse visit to compare values.    - Current Hypertension Medications:   Hypertension Medications               lisinopriL (PRINIVIL,ZESTRIL) 20 MG tablet Take 1 tablet (20 mg total) by mouth once daily.   -continue lifestyle modification with low  sodium diet and exercise   -discussed hypertension disease course and importance of treating high blood pressure  -patient understood and advised of risk of untreated blood pressure.  ER precautions were given   for symptoms of hypertensive urgency and emergency.           Relevant Medications    clopidogreL (PLAVIX) 75 mg tablet    ezetimibe (ZETIA) 10 mg tablet    lisinopriL (PRINIVIL,ZESTRIL) 20 MG tablet       Endocrine    Prediabetes    Overview     Lab Results   Component Value Date    HGBA1C 6.1 (H) 01/05/2022   -current meds:   Diabetes Medications               metFORMIN (GLUCOPHAGE) 500 MG tablet Take 1 tablet (500 mg total) by mouth 2 (two) times daily.     -discussed the importance of limiting sugary drinks (sodas, juices, sweet teas) and participating in regular daily exercise 30 min 3-5 days weekly.   3-6 m follow up               Relevant Medications    clopidogreL (PLAVIX) 75 mg tablet    ezetimibe (ZETIA) 10 mg tablet    lisinopriL (PRINIVIL,ZESTRIL) 20 MG tablet    metFORMIN (GLUCOPHAGE) 500 MG tablet    Other Relevant Orders    PSA, Screening    Comprehensive Metabolic Panel    Microalbumin/Creatinine Ratio, Urine    CBC Auto Differential    TSH    Lipid Panel    Hemoglobin A1C    US AAA Screening    Vitamin D deficiency    Overview     Will repeat           Relevant Orders    Vitamin D       Orthopedic    Left wrist pain    Overview     Chronic recurrent pain in setting of frequent falls, slipping on wet steps  Hx of bilateral carpal tunnel   - XR L wrist  - avoid falls. Declines walking assistive devices            Relevant Orders    X-Ray Wrist Complete Left     Other Visit Diagnoses       Annual physical exam        Relevant Orders    PSA, Screening    Comprehensive Metabolic Panel    Microalbumin/Creatinine Ratio, Urine    CBC Auto Differential    TSH    Lipid Panel    Hemoglobin A1C    Encounter for abdominal aortic aneurysm (AAA) screening        Relevant Orders    US AAA Screening     Encounter for screening for malignant neoplasm of prostate        Relevant Orders    PSA, Screening    Colon cancer screening        Relevant Orders    Ambulatory referral/consult to Endo Procedure     Need for vaccination for Strep pneumoniae        Relevant Orders    (In Office Administered) Pneumococcal Conjugate Vaccine (20 Valent) (IM) (Completed)                Follow up in about 3 months (around 7/13/2023) for Fasting labs today, vaccine today, recheck BP today.    Sandy Sanches MD  _________________________________________________________________________      Patient ID: Javier Barrera is a 67 y.o. male.    Chief Complaint:  annual  Has been out of all meds for several months. He has been in [place] for work. He would like meds transferred to [facility].    Chronic recurrent pain in setting of frequent falls, slipping on wet steps. Denies hitting his head. No LOC.  Hx of bilateral carpal tunnel. Declines walking assistive devices.    Otherwise, patient has been feeling well. No additional concerns. Denies nausea, vomiting, fevers, chills, abdominal pain, fatigue.     Past medical histories reviewed, including past medical, surgical, family and social histories.      Current Outpatient Medications on File Prior to Visit   Medication Sig Dispense Refill    aspirin (ECOTRIN) 81 MG EC tablet Take 1 tablet (81 mg total) by mouth once daily. 90 tablet 3    potassium chloride SA (K-DUR,KLOR-CON) 20 MEQ tablet Take 20 mEq by mouth once daily.      [DISCONTINUED] clopidogreL (PLAVIX) 75 mg tablet Take 1 tablet (75 mg total) by mouth once daily. (Patient not taking: Reported on 4/13/2023) 90 tablet 0    [DISCONTINUED] cyclobenzaprine (FLEXERIL) 10 MG tablet TAKE 1/2 TO 1 TABLET BY MOUTH TWICE DAILY AS NEEDED FOR MUSCLE SPASMS (Patient not taking: Reported on 4/13/2023) 60 tablet 0    [DISCONTINUED] ezetimibe (ZETIA) 10 mg tablet TAKE 1 TABLET BY MOUTH EVERY EVENING (Patient not taking: Reported on 4/13/2023)  30 tablet 3    [DISCONTINUED] gabapentin (NEURONTIN) 600 MG tablet Take 1 tablet (600 mg total) by mouth 3 (three) times daily. (Patient not taking: Reported on 4/13/2023) 90 tablet 11    [DISCONTINUED] HYDROcodone-acetaminophen (NORCO) 5-325 mg per tablet Take 1 tablet by mouth every 6 (six) hours as needed for Pain. (Patient not taking: Reported on 4/13/2023) 20 tablet 0    [DISCONTINUED] lisinopriL (PRINIVIL,ZESTRIL) 20 MG tablet TAKE 1 TABLET BY MOUTH ONCE DAILY (Patient not taking: Reported on 4/13/2023) 30 tablet 3    [DISCONTINUED] metFORMIN (GLUCOPHAGE) 500 MG tablet TAKE 1 TABLET BY MOUTH TWICE DAILY (Patient not taking: Reported on 4/13/2023) 60 tablet 0     No current facility-administered medications on file prior to visit.       Review of Systems   12 point review of systems negative except for listed in HPI.     Objective:    Nursing note and vitals reviewed.  Vitals:    04/13/23 0754   BP: (!) 154/86   Pulse: 75   Resp: 16   Temp: 98.2 °F (36.8 °C)     Body mass index is 33.89 kg/m².     Physical Exam   Constitutional: oriented to person, place, and time. well-developed and well-nourished. No distress.   HENT: WNL  Head: Normocephalic and atraumatic.   Eyes: EOM are normal.   Neck: Normal range of motion. Neck supple.   Cardiovascular: Normal rate  Pulmonary/Chest: Effort normal. No respiratory distress.   Musculoskeletal: l wrist +ttp ventral aspect near medial epicondyle. no edema, erythema, or ecchymosis   Full rom and strength  Neurological: CN II-XII intact  Skin: warm and dry.   Psychiatric: normal mood and affect. behavior is normal.           We Offer Telehealth & Same Day Appointments!   Book your Telehealth appointment with me through my nurse or   Clinic appointments on [employer]!  [phone]     To Schedule appointments online, go to [employer]: [email or web address]

## 2023-04-14 DIAGNOSIS — R79.89 LOW SERUM VITAMIN D: Primary | ICD-10-CM

## 2023-04-14 RX ORDER — ERGOCALCIFEROL 1.25 MG/1
50000 CAPSULE ORAL
Qty: 12 CAPSULE | Refills: 4 | Status: SHIPPED | OUTPATIENT
Start: 2023-04-14

## 2023-04-24 ENCOUNTER — HOSPITAL ENCOUNTER (OUTPATIENT)
Dept: PREADMISSION TESTING | Facility: HOSPITAL | Age: 68
Discharge: HOME | End: 2023-04-24
Attending: INTERNAL MEDICINE
Payer: MEDICARE

## 2023-04-24 DIAGNOSIS — Z12.11 COLON CANCER SCREENING: ICD-10-CM

## 2023-04-30 ENCOUNTER — CLINICAL SUPPORT (OUTPATIENT)
Dept: CARDIOLOGY | Facility: HOSPITAL | Age: 68
End: 2023-04-30
Payer: MEDICARE

## 2023-04-30 DIAGNOSIS — Z95.818 PRESENCE OF OTHER CARDIAC IMPLANTS AND GRAFTS: ICD-10-CM

## 2023-05-11 DIAGNOSIS — R73.03 PREDIABETES: ICD-10-CM

## 2023-05-11 RX ORDER — METFORMIN HYDROCHLORIDE 500 MG/1
TABLET ORAL
Qty: 180 TABLET | Refills: 1 | Status: SHIPPED | OUTPATIENT
Start: 2023-05-11 | End: 2023-09-27

## 2023-05-11 NOTE — TELEPHONE ENCOUNTER
No care due was identified.  [facility] Embedded Care Due Messages. Reference number: [id number].   5/11/2023 2:42:58 PM CDT  
Refill Decision Note   Javierbhupinder Barrera  is requesting a refill authorization.  Brief Assessment and Rationale for Refill:  Approve     Medication Therapy Plan:       Medication Reconciliation Completed: No   Comments:     No Care Gaps recommended.     Note composed:2:46 PM 05/11/2023          
Bladder non-tender and non-distended. Urine clear yellow.

## 2023-05-30 ENCOUNTER — CLINICAL SUPPORT (OUTPATIENT)
Dept: CARDIOLOGY | Facility: HOSPITAL | Age: 68
End: 2023-05-30
Payer: MEDICARE

## 2023-05-30 DIAGNOSIS — Z95.818 PRESENCE OF OTHER CARDIAC IMPLANTS AND GRAFTS: ICD-10-CM

## 2023-05-30 PROCEDURE — 93298 REM INTERROG DEV EVAL SCRMS: CPT | Mod: ,,, | Performed by: INTERNAL MEDICINE

## 2023-05-30 PROCEDURE — 93298 CARDIAC DEVICE CHECK - REMOTE: ICD-10-PCS | Mod: ,,, | Performed by: INTERNAL MEDICINE

## 2023-06-29 ENCOUNTER — CLINICAL SUPPORT (OUTPATIENT)
Dept: CARDIOLOGY | Facility: HOSPITAL | Age: 68
End: 2023-06-29
Payer: MEDICARE

## 2023-06-29 DIAGNOSIS — Z95.818 PRESENCE OF OTHER CARDIAC IMPLANTS AND GRAFTS: ICD-10-CM

## 2023-07-29 ENCOUNTER — CLINICAL SUPPORT (OUTPATIENT)
Dept: CARDIOLOGY | Facility: HOSPITAL | Age: 68
End: 2023-07-29
Payer: MEDICARE

## 2023-07-29 DIAGNOSIS — Z95.818 PRESENCE OF OTHER CARDIAC IMPLANTS AND GRAFTS: ICD-10-CM

## 2023-07-29 PROCEDURE — 93298 REM INTERROG DEV EVAL SCRMS: CPT | Mod: ,,, | Performed by: INTERNAL MEDICINE

## 2023-07-29 PROCEDURE — 93298 CARDIAC DEVICE CHECK - REMOTE: ICD-10-PCS | Mod: ,,, | Performed by: INTERNAL MEDICINE

## 2023-08-27 ENCOUNTER — HOSPITAL ENCOUNTER (EMERGENCY)
Facility: HOSPITAL | Age: 68
Discharge: HOME | End: 2023-08-28
Attending: EMERGENCY MEDICINE
Payer: MEDICARE

## 2023-08-27 DIAGNOSIS — R60.0 BILATERAL LOWER EXTREMITY EDEMA: Primary | ICD-10-CM

## 2023-08-27 DIAGNOSIS — M79.89 LEG SWELLING: ICD-10-CM

## 2023-08-27 LAB
ALBUMIN SERPL BCP-MCNC: 3.8 G/DL (ref 3.5–5.2)
ALP SERPL-CCNC: 77 U/L (ref 55–135)
ALT SERPL W/O P-5'-P-CCNC: 18 U/L (ref 10–44)
ANION GAP SERPL CALC-SCNC: 8 MMOL/L (ref 8–16)
AST SERPL-CCNC: 28 U/L (ref 10–40)
BASOPHILS # BLD AUTO: 0.04 K/UL (ref 0–0.2)
BASOPHILS NFR BLD: 0.5 % (ref 0–1.9)
BILIRUB SERPL-MCNC: 0.3 MG/DL (ref 0.1–1)
BNP SERPL-MCNC: 14 PG/ML (ref 0–99)
BUN SERPL-MCNC: 19 MG/DL (ref 8–23)
CALCIUM SERPL-MCNC: 9.3 MG/DL (ref 8.7–10.5)
CHLORIDE SERPL-SCNC: 111 MMOL/L (ref 95–110)
CO2 SERPL-SCNC: 26 MMOL/L (ref 23–29)
CREAT SERPL-MCNC: 1.4 MG/DL (ref 0.5–1.4)
DIFFERENTIAL METHOD: ABNORMAL
EOSINOPHIL # BLD AUTO: 0.3 K/UL (ref 0–0.5)
EOSINOPHIL NFR BLD: 4.1 % (ref 0–8)
ERYTHROCYTE [DISTWIDTH] IN BLOOD BY AUTOMATED COUNT: 14.1 % (ref 11.5–14.5)
EST. GFR  (NO RACE VARIABLE): 55 ML/MIN/1.73 M^2
GLUCOSE SERPL-MCNC: 88 MG/DL (ref 70–110)
HCT VFR BLD AUTO: 36.6 % (ref 40–54)
HCV AB SERPL QL IA: NEGATIVE
HEP C VIRUS HOLD SPECIMEN: NORMAL
HGB BLD-MCNC: 11.9 G/DL (ref 14–18)
HIV 1+2 AB+HIV1 P24 AG SERPL QL IA: NEGATIVE
IMM GRANULOCYTES # BLD AUTO: 0.04 K/UL (ref 0–0.04)
IMM GRANULOCYTES NFR BLD AUTO: 0.5 % (ref 0–0.5)
LYMPHOCYTES # BLD AUTO: 1.6 K/UL (ref 1–4.8)
LYMPHOCYTES NFR BLD: 19.7 % (ref 18–48)
MCH RBC QN AUTO: 31 PG (ref 27–31)
MCHC RBC AUTO-ENTMCNC: 32.5 G/DL (ref 32–36)
MCV RBC AUTO: 95 FL (ref 82–98)
MONOCYTES # BLD AUTO: 1 K/UL (ref 0.3–1)
MONOCYTES NFR BLD: 11.5 % (ref 4–15)
NEUTROPHILS # BLD AUTO: 5.3 K/UL (ref 1.8–7.7)
NEUTROPHILS NFR BLD: 63.7 % (ref 38–73)
NRBC BLD-RTO: 0 /100 WBC
PLATELET # BLD AUTO: 282 K/UL (ref 150–450)
PMV BLD AUTO: 10.5 FL (ref 9.2–12.9)
POTASSIUM SERPL-SCNC: 4.7 MMOL/L (ref 3.5–5.1)
PROT SERPL-MCNC: 7.2 G/DL (ref 6–8.4)
RBC # BLD AUTO: 3.84 M/UL (ref 4.6–6.2)
SODIUM SERPL-SCNC: 145 MMOL/L (ref 136–145)
TROPONIN I SERPL DL<=0.01 NG/ML-MCNC: <0.006 NG/ML (ref 0–0.03)
WBC # BLD AUTO: 8.26 K/UL (ref 3.9–12.7)

## 2023-08-27 PROCEDURE — 87389 HIV-1 AG W/HIV-1&-2 AB AG IA: CPT | Performed by: EMERGENCY MEDICINE

## 2023-08-27 PROCEDURE — 84484 ASSAY OF TROPONIN QUANT: CPT | Performed by: NURSE PRACTITIONER

## 2023-08-27 PROCEDURE — 85025 COMPLETE CBC W/AUTO DIFF WBC: CPT | Performed by: NURSE PRACTITIONER

## 2023-08-27 PROCEDURE — 93005 ELECTROCARDIOGRAM TRACING: CPT

## 2023-08-27 PROCEDURE — 99285 EMERGENCY DEPT VISIT HI MDM: CPT | Mod: 25

## 2023-08-27 PROCEDURE — 83880 ASSAY OF NATRIURETIC PEPTIDE: CPT | Performed by: NURSE PRACTITIONER

## 2023-08-27 PROCEDURE — 93010 EKG 12-LEAD: ICD-10-PCS | Mod: ,,, | Performed by: INTERNAL MEDICINE

## 2023-08-27 PROCEDURE — 86803 HEPATITIS C AB TEST: CPT | Performed by: EMERGENCY MEDICINE

## 2023-08-27 PROCEDURE — 80053 COMPREHEN METABOLIC PANEL: CPT | Performed by: NURSE PRACTITIONER

## 2023-08-27 PROCEDURE — 93010 ELECTROCARDIOGRAM REPORT: CPT | Mod: ,,, | Performed by: INTERNAL MEDICINE

## 2023-08-28 ENCOUNTER — CLINICAL SUPPORT (OUTPATIENT)
Dept: CARDIOLOGY | Facility: HOSPITAL | Age: 68
End: 2023-08-28
Payer: MEDICARE

## 2023-08-28 VITALS
HEART RATE: 74 BPM | DIASTOLIC BLOOD PRESSURE: 78 MMHG | TEMPERATURE: 99 F | BODY MASS INDEX: 33.89 KG/M2 | RESPIRATION RATE: 18 BRPM | OXYGEN SATURATION: 98 % | HEIGHT: 66 IN | SYSTOLIC BLOOD PRESSURE: 136 MMHG

## 2023-08-28 DIAGNOSIS — Z95.818 PRESENCE OF OTHER CARDIAC IMPLANTS AND GRAFTS: ICD-10-CM

## 2023-08-28 PROCEDURE — 96374 THER/PROPH/DIAG INJ IV PUSH: CPT

## 2023-08-28 PROCEDURE — 63600175 PHARM REV CODE 636 W HCPCS: Performed by: EMERGENCY MEDICINE

## 2023-08-28 RX ORDER — FUROSEMIDE 10 MG/ML
40 INJECTION INTRAMUSCULAR; INTRAVENOUS
Status: COMPLETED | OUTPATIENT
Start: 2023-08-28 | End: 2023-08-28

## 2023-08-28 RX ORDER — FUROSEMIDE 40 MG/1
20 TABLET ORAL DAILY
Qty: 14 TABLET | Refills: 0 | Status: SHIPPED | OUTPATIENT
Start: 2023-08-28 | End: 2024-08-27

## 2023-08-28 RX ADMIN — FUROSEMIDE 40 MG: 10 INJECTION, SOLUTION INTRAMUSCULAR; INTRAVENOUS at 01:08

## 2023-08-28 NOTE — FIRST PROVIDER EVALUATION
"Medical screening examination initiated.  I have conducted a focused provider triage encounter, findings are as follows:    Brief history of present illness:  miriam leg swelling    Vitals:    08/27/23 2057   Height: 5' 6" (1.676 m)       Pertinent physical exam:  nad    Brief workup plan:  labs, ekg, imaging, further eval      Preliminary workup initiated; this workup will be continued and followed by the physician or advanced practice provider that is assigned to the patient when roomed.  "

## 2023-08-28 NOTE — ED PROVIDER NOTES
SCRIBE #1 NOTE: I, Rama Ba, am scribing for, and in the presence of, Maggi Pineda MD. I have scribed the entire note.       History     Chief Complaint   Patient presents with    Leg Swelling     Pt reports bilateral lower extremity edema that has worsened over last 10 days, denies cp or SOB at this time     Review of patient's allergies indicates:   Allergen Reactions    Penicillins Swelling    Atorvastatin Other (See Comments)         History of Present Illness     HPI    8/28/2023, 12:23 AM  History obtained from the patient      History of Present Illness: Javier Barrera is a 67 y.o. male patient with a PMHx of DM2, HLD, HTN, and stroke who presents to the Emergency Department for evaluation of bilateral leg swelling which onset gradually 10 days ago. Pt states that compression stocking does not work for him. Pt says he does elevate his legs but only a couple inches. Symptoms are constant and moderate in severity. No mitigating or exacerbating factors reported. No associated symptoms reported. Patient denies any CP, SOB, fever, chills, weakness, and all other sxs at this time. No prior Tx reported. Pt states that he is on his feet a lot during the day. No further complaints or concerns at this time.       Arrival mode: Personal vehicle    PCP: Sandy Sanches MD        Past Medical History:  Past Medical History:   Diagnosis Date    Diabetes mellitus, type 2     Hyperlipidemia     Hypertension     Stroke        Past Surgical History:  Past Surgical History:   Procedure Laterality Date    HEMORRHOID SURGERY      INSERTION OF IMPLANTABLE LOOP RECORDER Left 2/2/2022    Procedure: INSERTION, IMPLANTABLE LOOP RECORDER;  Surgeon: Jairon Barahona MD;  Location: [facility] CATH LAB;  Service: Cardiology;  Laterality: Left;  scheduled for lukasz and loop implant in [facility] ([facility])         Family History:  No family history on file.    Social History:  Social History     Tobacco Use    Smoking status: Former      Current packs/day: 0.00     Types: Cigarettes     Quit date: 2001     Years since quittin.6    Smokeless tobacco: Never   Substance and Sexual Activity    Alcohol use: Not Currently    Drug use: Never    Sexual activity: Not Currently        Review of Systems     Review of Systems   Constitutional:  Negative for fever.   HENT:  Negative for sore throat.    Respiratory:  Negative for shortness of breath.    Cardiovascular:  Positive for leg swelling (bilateral). Negative for chest pain.   Gastrointestinal:  Negative for nausea.   Genitourinary:  Negative for dysuria.   Musculoskeletal:  Negative for back pain.   Skin:  Negative for rash.   Neurological:  Negative for weakness.   All other systems reviewed and are negative.       Physical Exam     Initial Vitals [23]   BP Pulse Resp Temp SpO2   (!) 140/66 87 18 98.1 °F (36.7 °C) 96 %      MAP       --          Physical Exam  Nursing Notes and Vital Signs Reviewed.  Constitutional: Patient is in no acute distress. Pt is obese.  Head: Atraumatic. Normocephalic.  Eyes: PERRL. EOM intact. Conjunctivae are not pale. No scleral icterus.  ENT: Mucous membranes are moist. Oropharynx is clear and symmetric.    Neck: Supple. Full ROM. No lymphadenopathy.  Cardiovascular: Regular rate. Regular rhythm. No murmurs, rubs, or gallops. Distal pulses are 2+ and symmetric.  Pulmonary/Chest: No respiratory distress. Clear to auscultation bilaterally. No wheezing or rales.  Abdominal: Soft and non-distended.  There is no tenderness.  No rebound, guarding, or rigidity. Good bowel sounds.  Genitourinary: No CVA tenderness  Musculoskeletal: Moves all extremities. No obvious deformities. 3+ pitting edema. Pt has a few ulcers that are warm to touch. No cellulitis.   Skin: Warm and dry.  Neurological:  Pt is asleep but was able to wake up for exam. Normal speech. No acute focal neurological deficits are appreciated.  Psychiatric: Normal affect. Good eye contact.  "Appropriate in content.     ED Course   Procedures  ED Vital Signs:  Vitals:    08/27/23 2057 08/28/23 0230   BP: (!) 140/66 136/78   Pulse: 87 74   Resp: 18 18   Temp: 98.1 °F (36.7 °C) 98.6 °F (37 °C)   TempSrc: Oral    SpO2: 96% 98%   Height: 5' 6" (1.676 m)        Abnormal Lab Results:  Labs Reviewed   CBC W/ AUTO DIFFERENTIAL - Abnormal; Notable for the following components:       Result Value    RBC 3.84 (*)     Hemoglobin 11.9 (*)     Hematocrit 36.6 (*)     All other components within normal limits    Narrative:     Release to patient->Immediate   COMPREHENSIVE METABOLIC PANEL - Abnormal; Notable for the following components:    Chloride 111 (*)     eGFR 55 (*)     All other components within normal limits    Narrative:     Release to patient->Immediate   TROPONIN I    Narrative:     Release to patient->Immediate   B-TYPE NATRIURETIC PEPTIDE    Narrative:     Release to patient->Immediate   HIV 1 / 2 ANTIBODY    Narrative:     Release to patient->Immediate   HEPATITIS C ANTIBODY    Narrative:     Release to patient->Immediate   HEP C VIRUS HOLD SPECIMEN    Narrative:     Release to patient->Immediate        All Lab Results:  Results for orders placed or performed during the hospital encounter of 08/27/23   CBC auto differential   Result Value Ref Range    WBC 8.26 3.90 - 12.70 K/uL    RBC 3.84 (L) 4.60 - 6.20 M/uL    Hemoglobin 11.9 (L) 14.0 - 18.0 g/dL    Hematocrit 36.6 (L) 40.0 - 54.0 %    MCV 95 82 - 98 fL    MCH 31.0 27.0 - 31.0 pg    MCHC 32.5 32.0 - 36.0 g/dL    RDW 14.1 11.5 - 14.5 %    Platelets 282 150 - 450 K/uL    MPV 10.5 9.2 - 12.9 fL    Immature Granulocytes 0.5 0.0 - 0.5 %    Gran # (ANC) 5.3 1.8 - 7.7 K/uL    Immature Grans (Abs) 0.04 0.00 - 0.04 K/uL    Lymph # 1.6 1.0 - 4.8 K/uL    Mono # 1.0 0.3 - 1.0 K/uL    Eos # 0.3 0.0 - 0.5 K/uL    Baso # 0.04 0.00 - 0.20 K/uL    nRBC 0 0 /100 WBC    Gran % 63.7 38.0 - 73.0 %    Lymph % 19.7 18.0 - 48.0 %    Mono % 11.5 4.0 - 15.0 %    Eosinophil % " 4.1 0.0 - 8.0 %    Basophil % 0.5 0.0 - 1.9 %    Differential Method Automated    Comprehensive metabolic panel   Result Value Ref Range    Sodium 145 136 - 145 mmol/L    Potassium 4.7 3.5 - 5.1 mmol/L    Chloride 111 (H) 95 - 110 mmol/L    CO2 26 23 - 29 mmol/L    Glucose 88 70 - 110 mg/dL    BUN 19 8 - 23 mg/dL    Creatinine 1.4 0.5 - 1.4 mg/dL    Calcium 9.3 8.7 - 10.5 mg/dL    Total Protein 7.2 6.0 - 8.4 g/dL    Albumin 3.8 3.5 - 5.2 g/dL    Total Bilirubin 0.3 0.1 - 1.0 mg/dL    Alkaline Phosphatase 77 55 - 135 U/L    AST 28 10 - 40 U/L    ALT 18 10 - 44 U/L    eGFR 55 (A) >60 mL/min/1.73 m^2    Anion Gap 8 8 - 16 mmol/L   Troponin I   Result Value Ref Range    Troponin I <0.006 0.000 - 0.026 ng/mL   Brain natriuretic peptide   Result Value Ref Range    BNP 14 0 - 99 pg/mL   HIV 1/2 Ag/Ab (4th Gen)   Result Value Ref Range    HIV 1/2 Ag/Ab Negative Negative   Hepatitis C Antibody   Result Value Ref Range    Hepatitis C Ab Negative Negative   HCV Virus Hold Specimen   Result Value Ref Range    HEP C Virus Hold Specimen Hold for HCV sendout          Imaging Results:  Imaging Results              X-Ray Chest AP Portable (Final result)  Result time 08/27/23 21:33:56      Final result by Armando Land MD (08/27/23 21:33:56)                   Impression:      No acute abnormality.      Electronically signed by: Armando Land  Date:    08/27/2023  Time:    21:33               Narrative:    EXAMINATION:  XR CHEST AP PORTABLE    CLINICAL HISTORY:  Other specified soft tissue disorders    TECHNIQUE:  Single frontal view of the chest was performed.    COMPARISON:  01/06/2022.    FINDINGS:  The lungs are clear, with normal appearance of pulmonary vasculature and no pleural effusion or pneumothorax.    The cardiac silhouette is normal in size. The hilar and mediastinal contours are unremarkable.    Bones are intact.                                       The EKG was ordered, reviewed, and independently interpreted by  the ED provider.  Interpretation time: 21:49  Rate: 74 BPM  Rhythm: normal sinus rhythm  Interpretation: Low voltage QRS. ST & T wave abnormality, consider inferolateral ischemia. No STEMI.           The Emergency Provider reviewed the vital signs and test results, which are outlined above.     ED Discussion     1:24 AM: Reassessed pt at this time. Pt given LASIX here at ED and will be discharged home with LASIX to take for a couple days.  Discussed with pt all pertinent ED information and results. Discussed pt dx and plan of tx. Gave pt all f/u and return to the ED instructions. All questions and concerns were addressed at this time. Pt expresses understanding of information and instructions, and is comfortable with plan to discharge. Pt is stable for discharge.    I discussed with patient and/or family/caretaker that evaluation in the ED does not suggest any emergent or life threatening medical conditions requiring immediate intervention beyond what was provided in the ED, and I believe patient is safe for discharge.  Regardless, an unremarkable evaluation in the ED does not preclude the development or presence of a serious of life threatening condition. As such, patient was instructed to return immediately for any worsening or change in current symptoms.         Medical Decision Making  Patient with bilateral leg swelling    Amount and/or Complexity of Data Reviewed  Labs: ordered. Decision-making details documented in ED Course.  Radiology: ordered. Decision-making details documented in ED Course.  ECG/medicine tests: ordered. Decision-making details documented in ED Course.  Discussion of management or test interpretation with external provider(s): Patient with bilateral lower extremity swelling about 2 to 3+ pitting edema no cellulitis.  No cardiomegaly no elevated BNP.  No history of congestive heart failure.  He reports that he is on his legs a lot during the day.  Feel he just has venous stasis.  He reports  that compression stockings do not work he does elevate his leg but it does not seem that he elevates it very high at home.  Will plan to give him Lasix 40 mg IV and discharge him with Lasix for a few days.    Risk  Prescription drug management.           Medical Decision Making:   Differential Diagnosis:   Venous stasis, congestive heart failure, DVT, cellulitis infection      ED Medication(s):  Medications   furosemide injection 40 mg (40 mg Intravenous Given 8/28/23 0114)       Discharge Medication List as of 8/28/2023  1:16 AM        START taking these medications    Details   furosemide (LASIX) 40 MG tablet Take 0.5 tablets (20 mg total) by mouth once daily., Starting Mon 8/28/2023, Until Tue 8/27/2024, Normal              Follow-up Information       Sandy Sanches MD In 1 day.    Specialty: Family Medicine  Contact information:  [address] [facility] [place] [address]  [phone]               Sandy Sanches MD.    Specialty: Family Medicine  Contact information:  [address] [facility] [address]  [phone]                                 Scribe Attestation:   Scribe #1: I performed the above scribed service and the documentation accurately describes the services I performed. I attest to the accuracy of the note.     Attending:   Physician Attestation Statement for Scribe #1: I, Maggi Pineda MD, personally performed the services described in this documentation, as scribed by Rama Ba, in my presence, and it is both accurate and complete.           Clinical Impression       ICD-10-CM ICD-9-CM   1. Bilateral lower extremity edema  R60.0 782.3   2. Leg swelling  M79.89 729.81       Disposition:   Disposition: Discharged  Condition: Stable         Maggi Pineda MD  08/29/23 0300

## 2023-09-27 ENCOUNTER — CLINICAL SUPPORT (OUTPATIENT)
Dept: CARDIOLOGY | Facility: HOSPITAL | Age: 68
End: 2023-09-27
Payer: MEDICARE

## 2023-09-27 DIAGNOSIS — Z95.818 PRESENCE OF OTHER CARDIAC IMPLANTS AND GRAFTS: ICD-10-CM

## 2023-09-27 DIAGNOSIS — R73.03 PREDIABETES: ICD-10-CM

## 2023-09-27 PROCEDURE — 93298 REM INTERROG DEV EVAL SCRMS: CPT | Mod: ,,, | Performed by: INTERNAL MEDICINE

## 2023-09-27 PROCEDURE — 93298 CARDIAC DEVICE CHECK - REMOTE: ICD-10-PCS | Mod: ,,, | Performed by: INTERNAL MEDICINE

## 2023-09-27 RX ORDER — METFORMIN HYDROCHLORIDE 500 MG/1
500 TABLET ORAL 2 TIMES DAILY
Qty: 60 TABLET | Refills: 3 | Status: SHIPPED | OUTPATIENT
Start: 2023-09-27

## 2023-09-27 NOTE — TELEPHONE ENCOUNTER
Care Due:                  Date            Visit Type   Department     Provider  --------------------------------------------------------------------------------                                EP -                              PRIMARY      [facility] FAMILY  Last Visit: 04-      CARE ([facility])   MEDICINE       Sandy Sanches  Next Visit: None Scheduled  None         None Found                                                            Last  Test          Frequency    Reason                     Performed    Due Date  --------------------------------------------------------------------------------    HBA1C.......  6 months...  metFORMIN................  04-   10-    [facility] Embedded Care Due Messages. Reference number: [id number].   9/27/2023 3:56:55 AM CDT

## 2023-10-25 ENCOUNTER — PATIENT MESSAGE (OUTPATIENT)
Dept: ADMINISTRATIVE | Facility: CLINIC | Age: 68
End: 2023-10-25
Payer: MEDICARE

## 2023-10-27 ENCOUNTER — CLINICAL SUPPORT (OUTPATIENT)
Dept: CARDIOLOGY | Facility: HOSPITAL | Age: 68
End: 2023-10-27
Payer: MEDICARE

## 2023-10-27 DIAGNOSIS — Z95.818 PRESENCE OF OTHER CARDIAC IMPLANTS AND GRAFTS: ICD-10-CM

## 2023-10-27 DIAGNOSIS — I49.8 OTHER SPECIFIED CARDIAC ARRHYTHMIAS: ICD-10-CM

## 2023-11-07 ENCOUNTER — PATIENT OUTREACH (OUTPATIENT)
Dept: ADMINISTRATIVE | Facility: HOSPITAL | Age: 68
End: 2023-11-07
Payer: MEDICARE

## 2023-12-08 ENCOUNTER — CLINICAL SUPPORT (OUTPATIENT)
Dept: CARDIOLOGY | Facility: HOSPITAL | Age: 68
End: 2023-12-08
Payer: MEDICARE

## 2023-12-08 DIAGNOSIS — I49.8 OTHER SPECIFIED CARDIAC ARRHYTHMIAS: ICD-10-CM

## 2024-01-08 ENCOUNTER — CLINICAL SUPPORT (OUTPATIENT)
Dept: CARDIOLOGY | Facility: HOSPITAL | Age: 69
End: 2024-01-08

## 2024-01-08 DIAGNOSIS — I49.8 OTHER SPECIFIED CARDIAC ARRHYTHMIAS: ICD-10-CM

## 2024-02-08 ENCOUNTER — CLINICAL SUPPORT (OUTPATIENT)
Dept: CARDIOLOGY | Facility: HOSPITAL | Age: 69
End: 2024-02-08
Payer: MEDICARE

## 2024-02-08 DIAGNOSIS — I49.8 OTHER SPECIFIED CARDIAC ARRHYTHMIAS: ICD-10-CM

## 2024-03-10 ENCOUNTER — CLINICAL SUPPORT (OUTPATIENT)
Dept: CARDIOLOGY | Facility: HOSPITAL | Age: 69
End: 2024-03-10

## 2024-03-10 ENCOUNTER — CLINICAL SUPPORT (OUTPATIENT)
Dept: CARDIOLOGY | Facility: HOSPITAL | Age: 69
End: 2024-03-10
Attending: INTERNAL MEDICINE
Payer: MEDICARE

## 2024-03-10 DIAGNOSIS — I49.8 OTHER SPECIFIED CARDIAC ARRHYTHMIAS: ICD-10-CM

## 2024-03-10 PROCEDURE — 93298 REM INTERROG DEV EVAL SCRMS: CPT | Mod: 26,S$PBB,, | Performed by: INTERNAL MEDICINE

## 2024-03-10 PROCEDURE — 93298 REM INTERROG DEV EVAL SCRMS: CPT | Mod: PBBFAC | Performed by: INTERNAL MEDICINE

## 2024-03-26 ENCOUNTER — TELEPHONE (OUTPATIENT)
Dept: CARDIOLOGY | Facility: HOSPITAL | Age: 69
End: 2024-03-26
Payer: MEDICARE

## 2024-04-04 ENCOUNTER — TELEPHONE (OUTPATIENT)
Dept: CARDIOLOGY | Facility: HOSPITAL | Age: 69
End: 2024-04-04
Payer: MEDICARE

## 2024-04-10 ENCOUNTER — CLINICAL SUPPORT (OUTPATIENT)
Dept: CARDIOLOGY | Facility: HOSPITAL | Age: 69
End: 2024-04-10
Payer: MEDICARE

## 2024-04-10 ENCOUNTER — CLINICAL SUPPORT (OUTPATIENT)
Dept: CARDIOLOGY | Facility: HOSPITAL | Age: 69
End: 2024-04-10
Attending: INTERNAL MEDICINE
Payer: MEDICARE

## 2024-04-10 DIAGNOSIS — I49.8 OTHER SPECIFIED CARDIAC ARRHYTHMIAS: ICD-10-CM

## 2024-04-10 PROCEDURE — 93298 REM INTERROG DEV EVAL SCRMS: CPT | Mod: 26,S$PBB,, | Performed by: INTERNAL MEDICINE

## 2024-04-10 PROCEDURE — 93298 REM INTERROG DEV EVAL SCRMS: CPT | Mod: PBBFAC | Performed by: INTERNAL MEDICINE

## 2024-04-28 LAB
OHS CV AF BURDEN PERCENT: < 1
OHS CV AF BURDEN PERCENT: < 1
OHS CV DC REMOTE DEVICE TYPE: NORMAL
OHS CV DC REMOTE DEVICE TYPE: NORMAL

## 2024-05-01 ENCOUNTER — PATIENT OUTREACH (OUTPATIENT)
Dept: ADMINISTRATIVE | Facility: HOSPITAL | Age: 69
End: 2024-05-01
Payer: MEDICARE

## 2024-05-01 NOTE — PROGRESS NOTES
No answer, LVM.  VBHM Score: 4     Colon Cancer Screening  Uncontrolled BP  Hemoglobin A1c  AAA Screening    Shingles/Zoster Vaccine  RSV Vaccine                  Health Maintenance Topic(s) Outreach Outcomes & Actions Taken:       Additional Notes:                 Care Management, Digital Medicine, and/or Education Referrals    OPCM Risk Score: 48                 Additional Notes:

## 2024-05-11 ENCOUNTER — CLINICAL SUPPORT (OUTPATIENT)
Dept: CARDIOLOGY | Facility: HOSPITAL | Age: 69
End: 2024-05-11

## 2024-05-11 ENCOUNTER — CLINICAL SUPPORT (OUTPATIENT)
Dept: CARDIOLOGY | Facility: HOSPITAL | Age: 69
End: 2024-05-11
Attending: INTERNAL MEDICINE
Payer: MEDICARE

## 2024-05-11 DIAGNOSIS — I49.8 OTHER SPECIFIED CARDIAC ARRHYTHMIAS: ICD-10-CM

## 2024-05-11 PROCEDURE — 93298 REM INTERROG DEV EVAL SCRMS: CPT | Performed by: INTERNAL MEDICINE

## 2024-05-11 PROCEDURE — 93298 REM INTERROG DEV EVAL SCRMS: CPT | Mod: 26,S$PBB,, | Performed by: INTERNAL MEDICINE

## 2024-06-04 ENCOUNTER — TELEPHONE (OUTPATIENT)
Dept: CARDIOLOGY | Facility: CLINIC | Age: 69
End: 2024-06-04
Payer: MEDICARE

## 2024-06-06 ENCOUNTER — TELEPHONE (OUTPATIENT)
Dept: CARDIOLOGY | Facility: HOSPITAL | Age: 69
End: 2024-06-06
Payer: MEDICARE

## 2024-06-07 NOTE — PROGRESS NOTES
[facility]  Neurology  Progress Note    Patient Name: Javier Barrera  MRN: [id number]  Admission Date: 1/5/2022  Hospital Length of Stay: 0 days  Code Status: Full Code   Attending Provider: Jasmyne Alvarez MD  Primary Care Physician: Primary Doctor No   Principal Problem:Acute CVA (cerebrovascular accident)    Subjective:     Interval History:   No major events reported overnight.  Patient feeling better this morning.  MRA of the head:    Negative MRA of the brain.  No large vessel occlusion.     Labs:  CRP 30.1  WBC 8.8    SBP 130s overnight    Prior history:    Patient is a 66-year-old man with past medical history of hypertension, DM, cervical radiculopathy, carpal tunnel syndrome bilaterally, previous smoker, who was admitted with concerns of ischemic stroke.  Over the past 2-3 months, patient has been experiencing visual field loss, intermittent dizziness.  Patient has been seeing Neuro-Ophthalmology, was found to have ischemic optic neuropathy bilaterally from eye exam.  Further workup included MRI of the brain which revealed:  Multiple small areas of recent infarction (acute or early subacute) involving the left cerebellar hemisphere and right occipital lobe as above.  No significant mass effect or associated hemorrhage.     Consider further evaluation with dedicated vascular imaging (i.e.  CTA head and neck).     Modest chronic microvascular ischemic change and small remote left frontal cortical infarct.     Small defect in the frontal lobe (middle frontal gyrus) as above, possibly remote infarct versus developmental abnormality.        Patient reports he has been taking aspirin pretty regularly prior to admission.  Inflammatory markers from ophthalmology workup were negative for GCA.     Additional labs:  H&H 11.8/36.3, WBC 9.6  Sodium 140, BUN 62, creatinine 2.6      Current Neurological Medications:   Current Facility-Administered Medications   Medication Dose Route Frequency  Provider Last Rate Last Admin    acetaminophen tablet 650 mg  650 mg Oral Q6H PRN Elvis Elizondo MD   650 mg at 01/05/22 2112    amLODIPine tablet 10 mg  10 mg Oral Daily Elvis Elizondo MD   10 mg at 01/06/22 0820    [START ON 1/7/2022] ascorbic acid (vitamin C) tablet 500 mg  500 mg Oral Daily Jasmyne Alvarez MD        aspirin EC tablet 81 mg  81 mg Oral Daily Elvis Elizondo MD   81 mg at 01/06/22 0820    [START ON 1/7/2022] cholecalciferol (vitamin D3) 125 mcg (5,000 unit) tablet 5,000 Units  5,000 Units Oral Daily Jasmyne Alvarez MD        clopidogreL tablet 75 mg  75 mg Oral Daily Jasmyne Alvarez MD   75 mg at 01/06/22 1253    dextrose 50% injection 12.5 g  12.5 g Intravenous PRN Jasmyne Alvarez MD        dextrose 50% injection 25 g  25 g Intravenous PRN Jasmyne Alvarez MD        fluconazole tablet 200 mg  200 mg Oral Daily Jasmyne Alvarez MD        gabapentin capsule 300 mg  300 mg Oral BID Jasmyne Alvarez MD   300 mg at 01/06/22 1253    glucagon (human recombinant) injection 1 mg  1 mg Intramuscular PRN Jasmyne Alvarez MD        glucose chewable tablet 16 g  16 g Oral PRN Jasmyne Alvarez MD        glucose chewable tablet 24 g  24 g Oral PRN Jasmyne Alvarez MD        heparin (porcine) injection 5,000 Units  5,000 Units Subcutaneous Q8H Elvis Elizondo MD   5,000 Units at 01/06/22 0547    HYDROcodone-acetaminophen 5-325 mg per tablet 1 tablet  1 tablet Oral Q6H PRN Jasmyne Alvarez MD        insulin aspart U-100 pen 0-5 Units  0-5 Units Subcutaneous QID (AC + HS) PRN Jasmyne Alvarez MD        metoprolol injection 5 mg  5 mg Intravenous Q4H PRN Elvis Elizondo MD        niacin SR tablet 1,000 mg  1,000 mg Oral QHS Elvis Elizondo MD        nystatin-triamcinolone ointment   Topical (Top) BID Jasmyne Alvarez MD        sodium chloride 0.9% flush 10 mL  10 mL Intravenous PRN Elvis Elizondo MD           Review of Systems     Constitutional: Negative.    HENT: Negative.    Eyes: Negative.     Respiratory: Negative.    Cardiovascular: Negative.    Gastrointestinal: Negative.    Endocrine: Negative.    Genitourinary: Negative.    Musculoskeletal: Negative.    Skin: Negative.    Allergic/Immunologic: Negative.    Neurological: Positive for dizziness and numbness.   Hematological: Negative.    Psychiatric/Behavioral: Negative.          Objective:     Vital Signs (Most Recent):  Temp: 98.1 °F (36.7 °C) (01/06/22 1223)  Pulse: 84 (01/06/22 1223)  Resp: 18 (01/06/22 1253)  BP: (!) 148/69 (01/06/22 1223)  SpO2: 95 % (01/06/22 1223) Vital Signs (24h Range):  Temp:  [98.1 °F (36.7 °C)-99 °F (37.2 °C)] 98.1 °F (36.7 °C)  Pulse:  [73-97] 84  Resp:  [18-20] 18  SpO2:  [93 %-99 %] 95 %  BP: (105-148)/(50-78) 148/69     Weight: 91.2 kg (201 lb)  Body mass index is 32.44 kg/m².    Physical Exam  HENT:      Head: Normocephalic.      Nose: Nose normal.      Mouth/Throat:      Mouth: Mucous membranes are moist.   Eyes:      Extraocular Movements: Extraocular movements intact and EOM normal.      Pupils: Pupils are equal, round, and reactive to light.   Cardiovascular:      Rate and Rhythm: Normal rate and regular rhythm.      Pulses: Normal pulses.      Heart sounds: Normal heart sounds.   Pulmonary:      Effort: Pulmonary effort is normal.      Breath sounds: Normal breath sounds.   Abdominal:      General: Abdomen is flat. Bowel sounds are normal.   Musculoskeletal:         General: Normal range of motion.   Skin:     General: Skin is warm.   Neurological:      Mental Status: He is alert and oriented to person, place, and time.      Coordination: Finger-Nose-Finger Test normal.      Deep Tendon Reflexes: Strength normal.      Reflex Scores:       Tricep reflexes are 1+ on the right side and 1+ on the left side.       Bicep reflexes are 1+ on the right side and 1+ on the left side.       Brachioradialis reflexes are 1+ on the right side and 1+ on the left side.       Patellar reflexes are 1+ on the right side and 1+ on  the left side.       Achilles reflexes are 1+ on the right side and 1+ on the left side.  Psychiatric:         Mood and Affect: Mood normal.            NEUROLOGICAL EXAMINATION:      MENTAL STATUS   Oriented to person, place, and time.   Normal comprehension.      CRANIAL NERVES      CN II   Visual acuity: normal  Left visual field deficit: lower temporal and upper temporal quadrant(s)     CN III, IV, VI   Pupils are equal, round, and reactive to light.  Extraocular motions are normal.      CN V   Facial sensation intact.      CN VII   Facial expression full, symmetric.      CN VIII   CN VIII normal.      CN IX, X   CN IX normal.      CN XI   CN XI normal.      CN XII   CN XII normal.      MOTOR EXAM   Overall muscle tone: normal     Strength   Strength 5/5 throughout.      REFLEXES      Reflexes   Right brachioradialis: 1+  Left brachioradialis: 1+  Right biceps: 1+  Left biceps: 1+  Right triceps: 1+  Left triceps: 1+  Right patellar: 1+  Left patellar: 1+  Right achilles: 1+  Left achilles: 1+  Right : 1+  Left : 1+  Right plantar: normal  Left plantar: normal     SENSORY EXAM   Light touch normal.      GAIT AND COORDINATION      Coordination   Finger to nose coordination: normal       Gait exam deferred due to patient's condition           Significant Labs:   CBC:   Recent Labs   Lab 01/05/22  1216 01/06/22  0448   WBC 9.63 8.86   HGB 11.8* 12.1*   HCT 36.3* 38.1*    298     CMP:   Recent Labs   Lab 01/05/22  1216 01/06/22  0448 01/06/22  1230   GLU 83 105 103    142 139   K 5.3* 6.0* 5.1    109 106   CO2 27 27 25   BUN 62* 47* 35*   CREATININE 2.6* 1.5* 1.2   CALCIUM 9.2 9.4 9.7   MG  --  2.0  --    PROT 6.7 6.7  --    ALBUMIN 3.6 3.2*  --    BILITOT 0.3 0.4  --    ALKPHOS 76 66  --    AST 33 25  --    ALT 35 26  --    ANIONGAP 8 6* 8   EGFRNONAA 24* 47* >60       Significant Imaging: I have reviewed all pertinent imaging results/findings within the past 24 hours.    Assessment and  Plan:     Patient initially seen by Neuro-Ophthalmology, found to have bilateral ischemic optic neuropathy.  Labs at that time included negative inflammatory markers; CRP notably elevated on admission.  MRA of the head negative for any vasculitis findings, no signs of ICAD.  Would obtain CTA of the neck with contrast tomorrow, expect renal function continued to improve.  He may also benefit from JAVAN as an outpatient (+ COVID); cardiology on board.    Plan:  CTA of the neck with contrast tomorrow  Check MARGUERITE profile, Anca vasculitis panel given bilateral optic neuropathy, elevated inflammatory markers.  Patient will need follow-up with Neuro-Ophthalmology  Continue on dual antiplatelet therapy X 3 weeks then switch to Plavix monotherapy  Continue atorvastatin 80 mg daily, LDL goal less than 70  JAVAN as outpatient  Neurochecks q.4 hours  DVT prophylaxis  Call Neurology with any questions        Active Diagnoses:    Diagnosis Date Noted POA    PRINCIPAL PROBLEM:  Acute CVA (cerebrovascular accident) [I63.9] 01/05/2022 Yes    Cervical disc disease [M50.90] 01/06/2022 Yes    COVID-19 virus detected [U07.1] 01/06/2022 Yes    Compulsive scratching behavior [R46.89] 01/05/2022 Yes    Tinea cruris [B35.6] 01/05/2022 Yes    SANTA (acute kidney injury) [N17.9] 01/05/2022 Yes    Mixed hyperlipidemia [E78.2] 12/11/2020 Yes    Prediabetes [R73.03] 12/11/2020 Yes      Problems Resolved During this Admission:       VTE Risk Mitigation (From admission, onward)         Ordered     heparin (porcine) injection 5,000 Units  Every 8 hours         01/05/22 1630     IP VTE HIGH RISK PATIENT  Once         01/05/22 1630     Place sequential compression device  Until discontinued         01/05/22 1630                Chidi Ramos MD  Neurology  [facility] ([facility])   Denies n/v. Last BM 6/6 per pt.

## 2024-06-11 ENCOUNTER — CLINICAL SUPPORT (OUTPATIENT)
Dept: CARDIOLOGY | Facility: HOSPITAL | Age: 69
End: 2024-06-11

## 2024-06-11 DIAGNOSIS — I49.8 OTHER SPECIFIED CARDIAC ARRHYTHMIAS: ICD-10-CM

## 2024-07-22 ENCOUNTER — TELEPHONE (OUTPATIENT)
Dept: CARDIOLOGY | Facility: HOSPITAL | Age: 69
End: 2024-07-22
Payer: MEDICARE

## 2024-07-29 ENCOUNTER — TELEPHONE (OUTPATIENT)
Dept: CARDIOLOGY | Facility: HOSPITAL | Age: 69
End: 2024-07-29
Payer: MEDICARE

## 2024-08-13 LAB
OHS CV AF BURDEN PERCENT: < 1
OHS CV DC REMOTE DEVICE TYPE: NORMAL

## 2024-08-17 ENCOUNTER — CLINICAL SUPPORT (OUTPATIENT)
Dept: CARDIOLOGY | Facility: HOSPITAL | Age: 69
End: 2024-08-17

## 2024-08-17 DIAGNOSIS — I49.8 OTHER SPECIFIED CARDIAC ARRHYTHMIAS: ICD-10-CM

## 2024-09-05 ENCOUNTER — TELEPHONE (OUTPATIENT)
Dept: CARDIOLOGY | Facility: HOSPITAL | Age: 69
End: 2024-09-05
Payer: MEDICARE

## 2024-09-12 DIAGNOSIS — I10 PRIMARY HYPERTENSION: ICD-10-CM

## 2024-09-17 ENCOUNTER — CLINICAL SUPPORT (OUTPATIENT)
Dept: CARDIOLOGY | Facility: HOSPITAL | Age: 69
End: 2024-09-17
Payer: MEDICARE

## 2024-09-17 DIAGNOSIS — I49.8 OTHER SPECIFIED CARDIAC ARRHYTHMIAS: ICD-10-CM

## 2024-10-01 ENCOUNTER — TELEPHONE (OUTPATIENT)
Dept: CARDIOLOGY | Facility: HOSPITAL | Age: 69
End: 2024-10-01
Payer: MEDICARE

## 2024-10-14 DIAGNOSIS — R73.03 PREDIABETES: ICD-10-CM

## 2024-10-14 DIAGNOSIS — E78.2 MIXED HYPERLIPIDEMIA: ICD-10-CM

## 2024-10-14 DIAGNOSIS — I10 PRIMARY HYPERTENSION: ICD-10-CM

## 2024-10-15 RX ORDER — GABAPENTIN 600 MG/1
600 TABLET ORAL 3 TIMES DAILY
Qty: 90 TABLET | Refills: 11 | Status: SHIPPED | OUTPATIENT
Start: 2024-10-15 | End: 2025-10-15

## 2024-10-15 RX ORDER — METFORMIN HYDROCHLORIDE 500 MG/1
500 TABLET ORAL 2 TIMES DAILY
Qty: 180 TABLET | Refills: 0 | Status: SHIPPED | OUTPATIENT
Start: 2024-10-15

## 2024-10-15 RX ORDER — CYCLOBENZAPRINE HCL 10 MG
10 TABLET ORAL 3 TIMES DAILY
Qty: 60 TABLET | Refills: 3 | Status: SHIPPED | OUTPATIENT
Start: 2024-10-15

## 2024-10-15 RX ORDER — LISINOPRIL 20 MG/1
20 TABLET ORAL DAILY
Qty: 90 TABLET | Refills: 0 | Status: SHIPPED | OUTPATIENT
Start: 2024-10-15

## 2024-10-15 NOTE — TELEPHONE ENCOUNTER
----- Message from Melissa sent at 10/15/2024 10:18 AM CDT -----  Contact: Javier  Type:  RX Refill Request    Who Called: Javier  Refill or New Rx:refill  RX Name and Strength:metFORMIN (GLUCOPHAGE) 500 MG tablet  How is the patient currently taking it? (ex. 1XDay):2xday  Is this a 30 day or 90 day RX:90day  Preferred Pharmacy with phone number:  [id number] - [place], [place] - [address] AT [address] [address]  Phone: [phone] Fax: [phone]  Local or Mail Order:local  Ordering Provider:   Would the patient rather a call back or a response via Dr. TATTOFFsner?  Call back  Best Call Back Number:[phone]  Additional Information:   Type:  RX Refill Request    Who Called: Javier  Refill or New Rx:refill  RX Name and Strength:lisinopriL (PRINIVIL,ZESTRIL) 20 MG tablet  How is the patient currently taking it? (ex. 1XDay):1xday  Is this a 30 day or 90 day RX:90day  Preferred Pharmacy with phone number:  [facility] #[id number] - [place], [place] - [address] AT [address] [address]  Phone: [phone] Fax: [phone]  Local or Mail Order:local  Ordering Provider:   Would the patient rather a call back or a response via Dr. TATTOFFsner?  Call back  Best Call Back Number:[phone]  Additional Information:   Type:  RX Refill Request    Who Called: Javier  Refill or New Rx:refill  RX Name and Strength:cyclobenzaprine (FLEXERIL) 10 MG tablet  How is the patient currently taking it? (ex. 1XDay):2xday  Is this a 30 day or 90 day RX:90day  Preferred Pharmacy with phone number:  [facility] #[id number] - [place], [place] - [address] AT [address] [address]  Phone: [phone] Fax: [phone]  Local or Mail Order:local  Ordering Provider:   Would the patient rather a call back or a response via [facility]?  Call back  Best Call Back  Number:[phone]  Additional Information:   Type:  RX Refill Request    Who Called: Javier  Refill or New Rx:refill  RX Name and Strength:gabapentin (NEURONTIN) 600 MG tablet  How is the patient currently taking it? (ex. 1XDay):3xday  Is this a 30 day or 90 day RX:90day  Preferred Pharmacy with phone number:  [facility] #[id number] - [place], [place] - [address] [facility] [address]  [address] [facility] [address]  Phone: [phone] Fax: [phone]  Local or Mail Order:local  Ordering Provider:   Would the patient rather a call back or a response via [facility]?  Call back  Best Call Back Number:[phone]  Additional Information:   thanks  Am

## 2024-10-15 NOTE — TELEPHONE ENCOUNTER
Care Due:                  Date            Visit Type   Department     Provider  --------------------------------------------------------------------------------                                EP -                              PRIMARY      [facility] FAMILY  Last Visit: 04-      CARE ([facility])   MEDICINE       Sandy Sanches  Next Visit: None Scheduled  None         None Found                                                            Last  Test          Frequency    Reason                     Performed    Due Date  --------------------------------------------------------------------------------    Office Visit  12 months..  clopidogreL,               04- 04-                             ergocalciferol,                             ezetimibe, lisinopriL,                             metFORMIN................    CBC.........  12 months..  clopidogreL..............  08- 08-    CMP.........  12 months..  ergocalciferol,            08- 08-                             ezetimibe, lisinopriL,                             metFORMIN................    HBA1C.......  6 months...  metFORMIN................  04-   10-    Lipid Panel.  12 months..  ezetimibe................  04- 04-    Vitamin D...  12 months..  ergocalciferol...........  04- 04-    [facility] Embedded Care Due Messages. Reference number: [id number].   10/14/2024 7:24:15 PM CDT

## 2024-10-15 NOTE — TELEPHONE ENCOUNTER
----- Message from Katherine sent at 10/15/2024 10:02 AM CDT -----  Contact: pt  Type:  RX Refill Request    Who Called:  pt  Refill or New Rx: refills (3)  RX Name and Strength: gabapentin (NEURONTIN) 600 MG tablet, cyclobenzaprine (FLEXERIL) 10 MG tablet, and metFORMIN (GLUCOPHAGE) 500 MG tablet  How is the patient currently taking it? (ex. 1XDay):  Is this a 30 day or 90 day RX:  Preferred Pharmacy with phone number: [phone]  Local or Mail Order: local  Ordering Provider: royal  Would the patient rather a call back or a response via [facility]?   Best Call Back Number:  Additional Information:  pt has scheduled his annual for 10/29, pt asked if need call him please       [facility] #[id number] - [place] - [address] AT [facility] [address] [address]  Phone: [phone] Fax: [phone]

## 2024-10-15 NOTE — TELEPHONE ENCOUNTER
Refill Routing Note   Medication(s) are not appropriate for processing by [facility] for the following reason(s):        ED/Hospital Visit since last OV with provider  Patient not seen by provider within 15 months  Outside of protocol  Required vitals outdated  Required labs outdated  Required labs abnormal    [facility] action(s):  Defer  Route             Appointments  past 12m or future 3m with PCP    Date Provider   Last Visit   4/13/2023 Sandy Sanches MD   Next Visit   10/29/2024 Sandy Sanches MD   ED visits in past 90 days: 0        Note composed:10:30 AM 10/15/2024

## 2024-10-17 ENCOUNTER — OFFICE VISIT (OUTPATIENT)
Dept: FAMILY MEDICINE | Facility: CLINIC | Age: 69
End: 2024-10-17
Payer: MEDICARE

## 2024-10-17 ENCOUNTER — LAB VISIT (OUTPATIENT)
Dept: LAB | Facility: HOSPITAL | Age: 69
End: 2024-10-17
Attending: PHYSICIAN ASSISTANT
Payer: MEDICARE

## 2024-10-17 VITALS
SYSTOLIC BLOOD PRESSURE: 112 MMHG | HEART RATE: 76 BPM | BODY MASS INDEX: 37.12 KG/M2 | HEIGHT: 66 IN | WEIGHT: 231 LBS | DIASTOLIC BLOOD PRESSURE: 66 MMHG | RESPIRATION RATE: 18 BRPM

## 2024-10-17 DIAGNOSIS — I69.398 VISUAL DISTURBANCE AS COMPLICATION OF STROKE: ICD-10-CM

## 2024-10-17 DIAGNOSIS — Z09 HOSPITAL DISCHARGE FOLLOW-UP: Primary | ICD-10-CM

## 2024-10-17 DIAGNOSIS — I70.0 AORTIC ATHEROSCLEROSIS: ICD-10-CM

## 2024-10-17 DIAGNOSIS — Z12.5 SCREENING PSA (PROSTATE SPECIFIC ANTIGEN): ICD-10-CM

## 2024-10-17 DIAGNOSIS — Z78.9 ALCOHOL USE: ICD-10-CM

## 2024-10-17 DIAGNOSIS — E55.9 VITAMIN D DEFICIENCY: ICD-10-CM

## 2024-10-17 DIAGNOSIS — H53.9 VISUAL DISTURBANCE AS COMPLICATION OF STROKE: ICD-10-CM

## 2024-10-17 DIAGNOSIS — G89.29 CHRONIC BILATERAL LOW BACK PAIN WITH BILATERAL SCIATICA: ICD-10-CM

## 2024-10-17 DIAGNOSIS — E66.01 SEVERE OBESITY (BMI 35.0-39.9) WITH COMORBIDITY: ICD-10-CM

## 2024-10-17 DIAGNOSIS — Z86.73 HISTORY OF CVA (CEREBROVASCULAR ACCIDENT): ICD-10-CM

## 2024-10-17 DIAGNOSIS — M54.42 CHRONIC BILATERAL LOW BACK PAIN WITH BILATERAL SCIATICA: ICD-10-CM

## 2024-10-17 DIAGNOSIS — I10 PRIMARY HYPERTENSION: ICD-10-CM

## 2024-10-17 DIAGNOSIS — E78.2 MIXED HYPERLIPIDEMIA: ICD-10-CM

## 2024-10-17 DIAGNOSIS — R73.03 PREDIABETES: ICD-10-CM

## 2024-10-17 DIAGNOSIS — M54.41 CHRONIC BILATERAL LOW BACK PAIN WITH BILATERAL SCIATICA: ICD-10-CM

## 2024-10-17 PROBLEM — F10.90 ALCOHOL USE: Status: RESOLVED | Noted: 2024-10-17 | Resolved: 2024-10-17

## 2024-10-17 PROBLEM — F10.90 ALCOHOL USE: Status: ACTIVE | Noted: 2024-10-17

## 2024-10-17 LAB
ALBUMIN SERPL BCP-MCNC: 3.6 G/DL (ref 3.5–5.2)
ALP SERPL-CCNC: 79 U/L (ref 40–150)
ALT SERPL W/O P-5'-P-CCNC: 30 U/L (ref 10–44)
ANION GAP SERPL CALC-SCNC: 9 MMOL/L (ref 8–16)
AST SERPL-CCNC: 30 U/L (ref 10–40)
BASOPHILS # BLD AUTO: 0.03 K/UL (ref 0–0.2)
BASOPHILS NFR BLD: 0.3 % (ref 0–1.9)
BILIRUB SERPL-MCNC: 0.3 MG/DL (ref 0.1–1)
BUN SERPL-MCNC: 26 MG/DL (ref 8–23)
CALCIUM SERPL-MCNC: 9 MG/DL (ref 8.7–10.5)
CHLORIDE SERPL-SCNC: 104 MMOL/L (ref 95–110)
CO2 SERPL-SCNC: 25 MMOL/L (ref 23–29)
CREAT SERPL-MCNC: 1.3 MG/DL (ref 0.5–1.4)
DIFFERENTIAL METHOD BLD: ABNORMAL
EOSINOPHIL # BLD AUTO: 0.3 K/UL (ref 0–0.5)
EOSINOPHIL NFR BLD: 2.7 % (ref 0–8)
ERYTHROCYTE [DISTWIDTH] IN BLOOD BY AUTOMATED COUNT: 13.1 % (ref 11.5–14.5)
EST. GFR  (NO RACE VARIABLE): 59.5 ML/MIN/1.73 M^2
GLUCOSE SERPL-MCNC: 109 MG/DL (ref 70–110)
HCT VFR BLD AUTO: 44 % (ref 40–54)
HGB BLD-MCNC: 13.7 G/DL (ref 14–18)
IMM GRANULOCYTES # BLD AUTO: 0.06 K/UL (ref 0–0.04)
IMM GRANULOCYTES NFR BLD AUTO: 0.6 % (ref 0–0.5)
LYMPHOCYTES # BLD AUTO: 1.6 K/UL (ref 1–4.8)
LYMPHOCYTES NFR BLD: 15.9 % (ref 18–48)
MCH RBC QN AUTO: 30.4 PG (ref 27–31)
MCHC RBC AUTO-ENTMCNC: 31.1 G/DL (ref 32–36)
MCV RBC AUTO: 98 FL (ref 82–98)
MONOCYTES # BLD AUTO: 1.1 K/UL (ref 0.3–1)
MONOCYTES NFR BLD: 10.4 % (ref 4–15)
NEUTROPHILS # BLD AUTO: 7.1 K/UL (ref 1.8–7.7)
NEUTROPHILS NFR BLD: 70.1 % (ref 38–73)
NRBC BLD-RTO: 0 /100 WBC
PLATELET # BLD AUTO: 254 K/UL (ref 150–450)
PMV BLD AUTO: 12.4 FL (ref 9.2–12.9)
POTASSIUM SERPL-SCNC: 4.7 MMOL/L (ref 3.5–5.1)
PROT SERPL-MCNC: 6.8 G/DL (ref 6–8.4)
RBC # BLD AUTO: 4.5 M/UL (ref 4.6–6.2)
SODIUM SERPL-SCNC: 138 MMOL/L (ref 136–145)
WBC # BLD AUTO: 10.16 K/UL (ref 3.9–12.7)

## 2024-10-17 PROCEDURE — 82306 VITAMIN D 25 HYDROXY: CPT | Performed by: PHYSICIAN ASSISTANT

## 2024-10-17 PROCEDURE — 80053 COMPREHEN METABOLIC PANEL: CPT | Performed by: PHYSICIAN ASSISTANT

## 2024-10-17 PROCEDURE — 99999 PR PBB SHADOW E&M-EST. PATIENT-LVL V: CPT | Mod: PBBFAC,,, | Performed by: PHYSICIAN ASSISTANT

## 2024-10-17 PROCEDURE — 36415 COLL VENOUS BLD VENIPUNCTURE: CPT | Mod: PO | Performed by: PHYSICIAN ASSISTANT

## 2024-10-17 PROCEDURE — 85025 COMPLETE CBC W/AUTO DIFF WBC: CPT | Performed by: PHYSICIAN ASSISTANT

## 2024-10-17 PROCEDURE — 84153 ASSAY OF PSA TOTAL: CPT | Performed by: PHYSICIAN ASSISTANT

## 2024-10-17 RX ORDER — LANOLIN ALCOHOL/MO/W.PET/CERES
100 CREAM (GRAM) TOPICAL DAILY
COMMUNITY
Start: 2024-10-11

## 2024-10-17 RX ORDER — ASPIRIN 325 MG
TABLET, DELAYED RELEASE (ENTERIC COATED) ORAL
COMMUNITY
End: 2024-10-18 | Stop reason: SDUPTHER

## 2024-10-17 RX ORDER — MULTIVITAMIN
1 TABLET ORAL DAILY
COMMUNITY
Start: 2024-10-11

## 2024-10-17 RX ORDER — ROSUVASTATIN CALCIUM 20 MG/1
20 TABLET, COATED ORAL NIGHTLY
COMMUNITY

## 2024-10-17 NOTE — PROGRESS NOTES
Transitional Care Note  Family and/or Caretaker present at visit?  No  Diagnostic tests reviewed/disposition: No diagnosic tests pending after this hospitalization.  Disease/illness education: he understands what she had.  Home health/community services discussion/referrals: Patient does not have home health established from hospital visit. They do not need home health.  If needed, we will set up home health for the patient.   Establishment or re-establishment of referral orders for community resources: No other necessary community resources.   Discussion with other health care providers: No discussion with other health care providers necessary.     Assessment/Plan:    Problem List Items Addressed This Visit          Neuro    History of CVA (cerebrovascular accident)    Overview     -hx of CVA in 2022 w/ residual visual deficits  -no longer on anticoagulation (Plavix)  -restarted ASA and Crestor at recent hospitalization  -previously followed by neurology and ophthalmology; new referral placed         Relevant Orders    Ambulatory referral/consult to Neurology       Psychiatric    Alcohol use    Overview     -recently hospitalized for acute encephalopathy suspected to be 2/2 alcohol vs. amphetamine use  -reports drinking 3-5 beers per day for the past 2 years   -has not had a drink since before hospitalization on 10/9/24  -denies symptoms of alcohol withdrawal  -remains on multivitamin, B12, and thiamine             Cardiac/Vascular    Mixed hyperlipidemia    Overview     Hyperlipidemia Medications               rosuvastatin (CRESTOR) 20 MG tablet Take 20 mg by mouth every evening.          -chronic condition. Currently stable.    -reports compliance with hyperlipidemia treatment as prescribed  -denies any known adverse effects of medications  -most recent labs listed below:  Lab Results   Component Value Date    CHOL 130 10/10/2024     Lab Results   Component Value Date    HDL 33 10/10/2024     Lab Results    Component Value Date    LDLCALC 80 10/10/2024     Lab Results   Component Value Date    TRIG 84 10/10/2024     Lab Results   Component Value Date    ALT 23 10/11/2024    AST 24 10/11/2024    ALKPHOS 74 10/11/2024    BILITOT 0.3 (L) 10/11/2024              Aortic atherosclerosis    Overview     -restarted ASA and Crestor at recent hospitalization         Primary hypertension    Overview     Hypertension Medications               lisinopriL (PRINIVIL,ZESTRIL) 20 MG tablet Take 1 tablet (20 mg total) by mouth once daily.          -at goal today  -continue lifestyle modification with low sodium diet and exercise   -discussed hypertension disease course and importance of treating high blood pressure  -patient understood and advised of risk of untreated blood pressure. ER precautions were given for symptoms of hypertensive urgency and emergency.         Relevant Orders    CBC Auto Differential    Comprehensive Metabolic Panel       Endocrine    Prediabetes    Overview     Lab Results   Component Value Date    HGBA1C 6 10/09/2024     -current meds:   Diabetes Medications               metFORMIN (GLUCOPHAGE) 500 MG tablet Take 1 tablet (500 mg total) by mouth 2 (two) times daily.            -discussed the importance of limiting sugary drinks (sodas, juices, sweet teas) and participating in regular daily exercise 30 min 3-5 days weekly.   3-6 m follow up             Vitamin D deficiency    Overview     -no longer on supplementation  -rechecking vit D level         Relevant Orders    Vitamin D    Severe obesity (BMI 35.0-39.9) with comorbidity    Overview     General weight loss/lifestyle modification strategies discussed (elicit support from others; identify saboteurs; non-food rewards, etc).  Informal exercise measures discussed, e.g. taking stairs instead of elevator.  Regular aerobic exercise program discussed.  Wt Readings from Last 3 Encounters:   10/17/24 1448 104.8 kg (231 lb)   04/13/23 0754 95.3 kg (210 lb)    02/18/22 1108 97.5 kg (214 lb 15.2 oz)               Orthopedic    Chronic bilateral low back pain with bilateral sciatica    Overview     -chronic, stable  -remains on Gabapentin and PRN muscle relaxants          Other Visit Diagnoses       Hospital discharge follow-up    -  Primary    Screening PSA (prostate specific antigen)        Relevant Orders    PSA, Screening    Visual disturbance as complication of stroke        Relevant Orders    Ambulatory referral/consult to Ophthalmology            Follow up in about 2 weeks (around 10/31/2024), or if symptoms worsen or fail to improve, for with PCP.    Odette Albrecht PA-C  _____________________________________________________________________________________________________________________________________________________    CC: hospital follow up    HPI: Patient is in clinic today as an established patient here for hospital follow up.    [facility] DISCHARGE SUMMARY    Patient ID:  Javier Barrera  [id number]  1955    Admit Date:  10/9/2024    Discharge Date:  Discharge Today: 10/11/2024    Discharge Physician:  ZAKIYA ALEJO MD    Primary Care Physician:  No primary care provider on file.    Reason for Admission/Admission Diagnoses:  Present on Admission:    Patient Active Problem List  Diagnosis  Acute delirium  Acute metabolic encephalopathy  Alcohol use disorder    Discharge Diagnoses:  Patient Active Problem List  Diagnosis  Acute delirium  Acute metabolic encephalopathy  Alcohol use disorder    Imaging:    Chest x ray 10/9- no acute abnormality    CT Head wo contrast 10/9- No acute intracranial abnormality. Mild chronic microvascular ischemic disease with old left parietal and left cerebellar infarcts.    CT cervical spine wo contrast 10/9- No evidence of acute osseous abnormality. Degenerative disc disease and facet osteoarthritis of the cervical spine. Carotid artery diseas    US Abdomen 10/9- Hepatomegaly with steatosis or other diffuse hepatocellular disorder.  "Cholelithiasis without secondary evidence of acute cholecystitis    MRI Brain 10/9- There is moderate brain atrophy and moderate microangiopathy. There is an old left  parietal infarct. There are old left cerebellar infarcts. There is no hemorrhage, mass lesion, acute  Infarct, or hydrocephalus    Cardiology:    EKG- NSR, rate 100, nl axis, nl intervals, T wave inversions in lateral leads    Consultants:  Neurology    Procedures:  EEG 10/11- normal in waking, drowsiness and sleep    History of Present Illness:  Javier Barrera presented to the hospital on 10/9/2024 with complaints of  Chief Complaint  Patient presents with  Fall  Hallucinations  Alcohol Intoxication  . Please see the full HPI in the H&P from this hospital admission.    Hospital Course and Treatment:    Patient is a 67 yo male with a PMHx DM type II, HTN, HLD, multiple prior strokes presenting for altered mental status for 1 day. Unclear picture as to what happened that brought him in as no family has been able to be contacted. Per chart review he was found "talking out of his head". Patient denies any confusion, auditory or visual hallucinations, or other issues. Does report drinking 2-5 beers per day with last drink morning of presentation. Blood alcohol level was negative. Denies any issues with alcohol withdrawal previously. CT head negative for any acute findings and lab workup otherwise is unrevealing. Admitted to hospital medicine for acute encephalopathy of unclear source. MRI brain negative for acute findings and reveals old strokes. Neurology consulted. EEG without evidence of seizure. He returned back to his baseline quickly and never needed any PRNs for alcohol withdrawal. Unclear etiology of his acute encephalopathy but UDS was positive for amphetamines which may have contributed. Will send home with multivitamin, B12, and thiamine given his alcohol use.    Problem List    Acute Encephalopathy, resolved  -presented via EMS for witnessed " ""talking out of his head"; unclear what truly transpired PTA as no witnesses or family can be reached  -on my exam he is somewhat odd but is oriented to person, place, time, and situation and denies any specific acute symptoms; does have some difficulties counting by 7s  -due to his alcohol use treated with IV thiamine  -D51/2 with folate and multivitamin started  -TSH, Folate, Treponema, HIV, Hep C, UDS all wnl; B12 low  -blood ethanol level negative; reports last drink this morning; could potentially have had some withdrawal with delirium but currently is not experiencing any signs or symptoms of withdrawal; CIWA protocol and PRN Valium ordered  -ammonia level slightly elevated at 78; does not have any evidence of cirrhosis on exam, labs, or imaging; he does have hepatomegaly with steatosis on his liver US; do not feel hepatic encephalopathy is the culprit here but given the benefits outweigh risks I will treat with lactulose while inpatient  -MRI Brain WO contrast with old strokes but no acute findings  -neurology consulted; EEG negative for seizure  -unclear source as workup has not revealed anything significant other than amphetamines positive on UDS which may have been contributing; He is back to his baseline; will send home with multivitamin, thiamine, b12 given alcohol use;    Alcohol use disorder  Transaminitis  Hepatic steatosis  -reports drinking 3-5 beers per day for the past 2 years  -does have very mildly elevated AST and has findings of hepatomegaly and steatosis on his liver US  -encouraged drinking cessation  -[facility] protocol for alcohol withdrawal as explained above; did not require any PRN benzos    B12 deficiency  -b12 low at 255  -possibly 2/2 alcohol use  -could be contributing to mild cognitive deficits  -B12 1000 mg PO started    Leukocytosis, resolved  -WBC 14 on presentation  -no other signs of infection or sepsis  -CTM    Normocytic anemia  -Hgb 13.5; MCV 91  -B12 low; folate normal; iron " studies wnl  -repleting B12    History of stroke  HTN  HLD  -seen on imaging at least since 2020  -does have residual visual deficits  -reportedly not taking any medications; will start Aspirin 81 mg and Lipitor 40 mg daily  -BP wnl in the ED; reportedly was on amlodipine but taken off due to ankle swelling; will start antihypertensives if needed    DM Type II  -last A1c 6 months ago was 6.1%  -not on meds outpatient  -will obtain repeat A1c  -SSI; goal glucose 140-180 inpatient    No results found.    Patient's Condition On Discharge:  Stable    Physical Exam  Constitutional:  General: He is not in acute distress.  Appearance: Normal appearance. He is obese. He is not ill-appearing.  HENT:  Head: Normocephalic and atraumatic.  Right Ear: External ear normal.  Left Ear: External ear normal.  Nose: Nose normal.  Mouth/Throat:  Pharynx: Oropharynx is clear.  Eyes:  General: No scleral icterus.  Extraocular Movements: Extraocular movements intact.  Conjunctiva/sclera: Conjunctivae normal.  Cardiovascular:  Rate and Rhythm: Normal rate and regular rhythm.  Pulses: Normal pulses.  Heart sounds: Normal heart sounds. No murmur heard.  No friction rub. No gallop.  Pulmonary:  Effort: Pulmonary effort is normal.  Breath sounds: Normal breath sounds. No wheezing, rhonchi or rales.  Abdominal:  General: Bowel sounds are normal. There is no distension.  Palpations: There is no mass.  Tenderness: There is no abdominal tenderness. There is no guarding or rebound.  Musculoskeletal:  General: Normal range of motion.  Cervical back: Normal range of motion. No tenderness.  Right lower leg: No edema.  Left lower leg: No edema.  Skin:  General: Skin is warm.  Coloration: Skin is not jaundiced.  Findings: No lesion or rash.  Neurological:  Mental Status: He is alert and oriented to person, place, and time.  Sensory: No sensory deficit.  Motor: No weakness.  Coordination: Coordination normal.  Comments: Oriented to person, place, year,  month, situation  Able to follow all commands  No asterixis    Discharge Orders:  No follow-ups on file.    Follow up items to address with PCP:  Alcohol and amphetamine use    Discharge Medications:  Current Outpatient Medications  Medication Instructions  [START ON 10/12/2024] aspirin 81 mg, Oral, Daily  [START ON 10/12/2024] cyanocobalamin (VITAMIN B-12) 1,000 mcg, Oral, Daily  hydroCHLOROthiazide 25 mg, Oral, Daily  multivitamin (THERAGRAN) Tab per tablet 1 tablet, Oral, Daily  naproxen (NAPROSYN) 500 mg, Oral, 2 times daily with breakfast and supper  [START ON 10/12/2024] rosuvastatin (CRESTOR) 20 mg, Oral, Daily  thiamine 100 mg, Oral, Daily    I have personally seen and examined Javier Barrera. He is cleared for discharge home with instructions to follow up as directed. Total time in the care and discharge planning of this patient was greater than 30 minutes.    Thank you for allowing us to take care of your patient here in the hospital. Please don't hesitate to call the hospitalist office with questions or concerns at [phone].    ZAKIYA ALEJO MD  10:58 AM  10/11/2024      History of Present Illness    CHIEF COMPLAINT:  Mr. Barrera presents today for follow up after recent hospital admission for acute encephalopathy.    RECENT HOSPITALIZATION:  He reports a recent hospital admission for acute encephalopathy. He experienced a confusion episode preceded by two days of insomnia. He was found by police and taken to the hospital by ambulance. He has no recollection of the events leading to his hospitalization. During the episode, he believed he was talking to a  person. This is the first time he has experienced such an event.    MEDICAL HISTORY:  He has a history of stroke, including multiple transient ischemic attacks (TIAs). He reports a family history of TIAs, noting his father experienced them as well. 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 He continues to have significant vision loss in both eyes since that time, with approximately one-third of vision remaining in the upper field of one eye and one-third in the lower field of the other eye. He has not seen an ophthalmologist or neurologist for follow-up since the initial diagnosis of stroke.    MEDICATIONS:  He is currently taking baby aspirin daily, metformin, lisinopril for blood pressure, gabapentin as needed for pain/muscle spasms, Flexeril as needed, and a cholesterol medication (Crestor/rosuvastatin). He also takes B12 vitamin, thiamine, and a multivitamin. He recently restarted his medications after being off them for about four months. He obtained three new prescriptions from a recent hospital visit, including the cholesterol medication and vitamins. He denies currently taking Plavix/clopidogrel, stating his doctor advised him to discontinue it due to taking aspirin. He is unsure about Zetia and does not recall if he is still taking it. He reports obtaining his medications from [employer] the night before the appointment.    SUBSTANCE USE:  He recently stopped alcohol consumption, previously using it to aid with sleep (two to three beers daily). He denies current use of amphetamines or other illicit substances. He discloses past marijuana use and diet pill (amphetamine) use when younger.    SLEEP ISSUES:  He reports chronic insomnia since childhood, typically sleeping about three hours a day. He recently experienced a two-day period without sleep due to severe insomnia.    PAIN:  He reports back pain that radiates down both sides when walking, even short distances. He describes the pain as continuous and limiting his mobility. He experiences severe leg cramps, particularly during sleep, which necessitate getting up and walking to alleviate symptoms.    SOCIAL HISTORY:  He lives alone but  has a friend who helps look after him and brought him to the appointment. He requires three days advance notice to arrange transportation for medical appointments.    No other complaints today.    Past Medical History:   Diagnosis Date    Diabetes mellitus, type 2     Hyperlipidemia     Hypertension     Stroke      Past Surgical History:   Procedure Laterality Date    HEMORRHOID SURGERY      INSERTION OF IMPLANTABLE LOOP RECORDER Left 2022    Procedure: INSERTION, IMPLANTABLE LOOP RECORDER;  Surgeon: Jairon Barahona MD;  Location: [facility] CATH LAB;  Service: Cardiology;  Laterality: Left;  scheduled for lukasz and loop implant in ru (MDT)     Review of patient's allergies indicates:   Allergen Reactions    Penicillins Swelling    Atorvastatin Other (See Comments)     Social History     Tobacco Use    Smoking status: Former     Current packs/day: 0.00     Types: Cigarettes     Quit date: 2001     Years since quittin.7    Smokeless tobacco: Never   Substance Use Topics    Alcohol use: Not Currently    Drug use: Never     No family history on file.  Current Outpatient Medications on File Prior to Visit   Medication Sig Dispense Refill    aspirin (ECOTRIN) 81 MG EC tablet Take 1 tablet (81 mg total) by mouth once daily. 90 tablet 3    cholecalciferol, vitamin D3, 1,250 mcg (50,000 unit) capsule TAKE 1 CAPSULE BY MOUTH EVERY WEEK with meals      cyclobenzaprine (FLEXERIL) 10 MG tablet Take 1 tablet (10 mg total) by mouth 3 (three) times daily. 60 tablet 3    ergocalciferol (ERGOCALCIFEROL) 50,000 unit Cap Take 1 capsule (50,000 Units total) by mouth every 7 days. 12 capsule 4    gabapentin (NEURONTIN) 600 MG tablet Take 1 tablet (600 mg total) by mouth 3 (three) times daily. 90 tablet 11    lisinopriL (PRINIVIL,ZESTRIL) 20 MG tablet Take 1 tablet (20 mg total) by mouth once daily. 90 tablet 0    metFORMIN (GLUCOPHAGE) 500 MG tablet TAKE 1 TABLET(500 MG) BY MOUTH TWICE DAILY 180 tablet 0    multivitamin  "(THERAGRAN) per tablet Take 1 tablet by mouth once daily.      potassium chloride SA (K-DUR,KLOR-CON) 20 MEQ tablet Take 20 mEq by mouth once daily.      rosuvastatin (CRESTOR) 20 MG tablet Take 20 mg by mouth every evening.      thiamine 100 MG tablet Take 100 mg by mouth once daily.      [DISCONTINUED] clopidogreL (PLAVIX) 75 mg tablet Take 1 tablet (75 mg total) by mouth once daily. 90 tablet 0    [DISCONTINUED] ezetimibe (ZETIA) 10 mg tablet Take 1 tablet (10 mg total) by mouth every evening. 90 tablet 3    [DISCONTINUED] furosemide (LASIX) 40 MG tablet Take 0.5 tablets (20 mg total) by mouth once daily. 14 tablet 0     No current facility-administered medications on file prior to visit.       Review of Systems   Constitutional:  Negative for chills, diaphoresis, fatigue and fever.   HENT:  Negative for congestion, ear pain, postnasal drip, sinus pain and sore throat.    Eyes:  Negative for pain and redness.   Respiratory:  Negative for cough, chest tightness and shortness of breath.    Cardiovascular:  Negative for chest pain and leg swelling.   Gastrointestinal:  Negative for abdominal pain, constipation, diarrhea, nausea and vomiting.   Genitourinary:  Negative for dysuria and hematuria.   Musculoskeletal:  Negative for arthralgias and joint swelling.   Skin:  Negative for rash.   Neurological:  Negative for dizziness, syncope and headaches.   Psychiatric/Behavioral:  Negative for dysphoric mood. The patient is not nervous/anxious.        Vitals:    10/17/24 1448   BP: 112/66   Pulse: 76   Resp: 18   Weight: 104.8 kg (231 lb)   Height: 5' 6" (1.676 m)       Wt Readings from Last 3 Encounters:   10/17/24 104.8 kg (231 lb)   04/13/23 95.3 kg (210 lb)   02/18/22 97.5 kg (214 lb 15.2 oz)       Physical Exam  Constitutional:       General: He is not in acute distress.     Appearance: Normal appearance. He is well-developed.   HENT:      Head: Normocephalic and atraumatic.   Eyes:      Conjunctiva/sclera: " Conjunctivae normal.   Cardiovascular:      Rate and Rhythm: Normal rate and regular rhythm.      Pulses: Normal pulses.      Heart sounds: Normal heart sounds. No murmur heard.  Pulmonary:      Effort: Pulmonary effort is normal. No respiratory distress.      Breath sounds: Normal breath sounds.   Abdominal:      General: Bowel sounds are normal. There is no distension.      Palpations: Abdomen is soft.      Tenderness: There is no abdominal tenderness.   Musculoskeletal:         General: Normal range of motion.      Cervical back: Normal range of motion and neck supple.   Skin:     General: Skin is warm and dry.      Findings: No rash.   Neurological:      General: No focal deficit present.      Mental Status: He is alert and oriented to person, place, and time.   Psychiatric:         Mood and Affect: Mood normal.         Behavior: Behavior normal.         Health Maintenance   Topic Date Due    Colorectal Cancer Screening  Never done    Shingles Vaccine (1 of 2) Never done    PROSTATE-SPECIFIC ANTIGEN  04/13/2024    High Dose Statin  10/17/2025    Lipid Panel  10/10/2029    TETANUS VACCINE  02/16/2030    Hepatitis C Screening  Completed    Abdominal Aortic Aneurysm Screening  Completed     DISCLAIMER: This note was compiled by using a speech recognition dictation system and therefore please be aware that typographical / speech recognition errors can and do occur.  Please contact me if you see any errors specifically.  Consent was obtained for [employer] recording system prior to the visit.

## 2024-10-17 NOTE — PATIENT INSTRUCTIONS
Uriel Herrera,     If you are due for any health screening(s) below please notify me so we can arrange them to be ordered and scheduled. Most healthy patients at your age complete them, but you are free to accept or refuse.     If you can't do it, I'll definitely understand. If you can, I'd certainly appreciate it!    Tests to Keep You Healthy    Colon Cancer Screening: DUE  Last Blood Pressure <= 139/89 (4/13/2023): NO      Its time for your colon cancer screening     Colorectal cancer is one of the leading causes of cancer death for men and women but it doesnt have to be. Screenings can prevent colorectal cancer or find it early enough to treat and cure the disease.     Our records indicate that you may be overdue for colon cancer screening. A colonoscopy or stool screening test can help identify patients at risk for developing colon cancer. Cancer screenings save lives, so schedule yours today to stay healthy.     A colonoscopy is the preferred test for detecting colon cancer. It is needed only once every 10 years if results are negative. While you are sedated, a flexible, lighted tube with a tiny camera is inserted into the rectum and advanced through the colon to look for cancers.     An alternative screening test that is used at home and returned to the lab may also be used. It detects hidden blood in bowel movements which could indicate cancer in the colon. If results are positive, you will need a colonoscopy to determine if the blood is a sign of cancer. This type of follow up (diagnostic) colonoscopy usually requires additional copays as required by your insurance provider.     If you recently had your colon cancer screening performed outside of [facility], please let your Health care team know so that they can update your health record. Please contact your PCP if you have any questions.

## 2024-10-18 ENCOUNTER — CLINICAL SUPPORT (OUTPATIENT)
Dept: CARDIOLOGY | Facility: HOSPITAL | Age: 69
End: 2024-10-18
Payer: MEDICARE

## 2024-10-18 ENCOUNTER — CLINICAL SUPPORT (OUTPATIENT)
Dept: CARDIOLOGY | Facility: HOSPITAL | Age: 69
End: 2024-10-18
Attending: INTERNAL MEDICINE
Payer: MEDICARE

## 2024-10-18 ENCOUNTER — TELEPHONE (OUTPATIENT)
Dept: NEUROLOGY | Facility: CLINIC | Age: 69
End: 2024-10-18
Payer: MEDICARE

## 2024-10-18 DIAGNOSIS — E55.9 VITAMIN D DEFICIENCY: Primary | ICD-10-CM

## 2024-10-18 DIAGNOSIS — I49.8 OTHER SPECIFIED CARDIAC ARRHYTHMIAS: ICD-10-CM

## 2024-10-18 LAB
25(OH)D3+25(OH)D2 SERPL-MCNC: 15 NG/ML (ref 30–96)
COMPLEXED PSA SERPL-MCNC: 0.51 NG/ML (ref 0–4)

## 2024-10-18 PROCEDURE — 93298 REM INTERROG DEV EVAL SCRMS: CPT | Performed by: INTERNAL MEDICINE

## 2024-10-18 PROCEDURE — 93298 REM INTERROG DEV EVAL SCRMS: CPT | Mod: 26,,, | Performed by: INTERNAL MEDICINE

## 2024-10-18 RX ORDER — ERGOCALCIFEROL 1.25 MG/1
50000 CAPSULE ORAL
Qty: 12 CAPSULE | Refills: 3 | Status: SHIPPED | OUTPATIENT
Start: 2024-10-18 | End: 2025-09-19

## 2024-10-18 NOTE — PROGRESS NOTES
Results have been released via [facility]. Please verify that these have been viewed by patient. If not, please call patient with results.     I have sent a message to them with the following interpretation (see below).    I have reviewed your recent blood work.     CBC NORMAL-The CBC appears to be stable at this time with no sign of major anemia, abnormal white count or platelet abnormality.  CMP/BMP NORMAL-The electrolytes all appear stable at this time. Kidney function is a little decreased. Please make sure to increase hydration with water and avoid taking NSAIDs (Ibuprofen, Naproxen). We will continue to monitor this. The liver enzymes were normal.   PSA NORMAL-The PSA screening for prostate cancer is normal. Recheck annually.    VITAMIN D-ABNORMAL-The Vitamin D level is abnormal. Please start taking 50,000 units of Vitamin D once weekly. I know you were previously on this medication. I will send a new prescription to your pharmacy.       Please do not hesitate to call or message with any additional questions or concerns.    Odette Albrecht PA-C

## 2024-10-28 LAB
OHS CV AF BURDEN PERCENT: < 1
OHS CV DC REMOTE DEVICE TYPE: NORMAL
OHS CV ICD SHOCK: NO

## 2024-11-07 ENCOUNTER — TELEPHONE (OUTPATIENT)
Dept: CARDIOLOGY | Facility: HOSPITAL | Age: 69
End: 2024-11-07
Payer: MEDICARE

## 2024-11-18 ENCOUNTER — CLINICAL SUPPORT (OUTPATIENT)
Dept: CARDIOLOGY | Facility: HOSPITAL | Age: 69
End: 2024-11-18
Attending: INTERNAL MEDICINE
Payer: MEDICARE

## 2024-11-18 ENCOUNTER — CLINICAL SUPPORT (OUTPATIENT)
Dept: CARDIOLOGY | Facility: HOSPITAL | Age: 69
End: 2024-11-18
Payer: MEDICARE

## 2024-11-18 DIAGNOSIS — I49.8 OTHER SPECIFIED CARDIAC ARRHYTHMIAS: ICD-10-CM

## 2024-11-18 PROCEDURE — 93298 REM INTERROG DEV EVAL SCRMS: CPT | Performed by: INTERNAL MEDICINE

## 2024-11-18 PROCEDURE — 93298 REM INTERROG DEV EVAL SCRMS: CPT | Mod: 26,,, | Performed by: INTERNAL MEDICINE

## 2024-11-25 LAB
OHS CV AF BURDEN PERCENT: < 1
OHS CV DC REMOTE DEVICE TYPE: NORMAL
OHS CV ICD SHOCK: NO

## 2024-12-19 ENCOUNTER — CLINICAL SUPPORT (OUTPATIENT)
Dept: CARDIOLOGY | Facility: HOSPITAL | Age: 69
End: 2024-12-19
Attending: INTERNAL MEDICINE
Payer: MEDICARE

## 2024-12-19 ENCOUNTER — CLINICAL SUPPORT (OUTPATIENT)
Dept: CARDIOLOGY | Facility: HOSPITAL | Age: 69
End: 2024-12-19
Payer: MEDICARE

## 2024-12-19 DIAGNOSIS — I49.8 OTHER SPECIFIED CARDIAC ARRHYTHMIAS: ICD-10-CM

## 2024-12-19 PROCEDURE — 93298 REM INTERROG DEV EVAL SCRMS: CPT | Mod: 26,,, | Performed by: INTERNAL MEDICINE

## 2024-12-19 PROCEDURE — 93298 REM INTERROG DEV EVAL SCRMS: CPT | Performed by: INTERNAL MEDICINE

## 2024-12-23 LAB
OHS CV AF BURDEN PERCENT: < 1
OHS CV DC REMOTE DEVICE TYPE: NORMAL

## 2025-01-07 ENCOUNTER — TELEPHONE (OUTPATIENT)
Dept: CARDIOLOGY | Facility: HOSPITAL | Age: 70
End: 2025-01-07
Payer: MEDICARE

## 2025-01-19 ENCOUNTER — CLINICAL SUPPORT (OUTPATIENT)
Dept: CARDIOLOGY | Facility: HOSPITAL | Age: 70
End: 2025-01-19
Attending: INTERNAL MEDICINE
Payer: MEDICARE

## 2025-01-19 ENCOUNTER — CLINICAL SUPPORT (OUTPATIENT)
Dept: CARDIOLOGY | Facility: HOSPITAL | Age: 70
End: 2025-01-19
Payer: MEDICARE

## 2025-01-19 DIAGNOSIS — I49.8 OTHER SPECIFIED CARDIAC ARRHYTHMIAS: ICD-10-CM

## 2025-01-19 PROCEDURE — 93298 REM INTERROG DEV EVAL SCRMS: CPT | Performed by: INTERNAL MEDICINE

## 2025-01-19 PROCEDURE — 93298 REM INTERROG DEV EVAL SCRMS: CPT | Mod: 26,,, | Performed by: INTERNAL MEDICINE

## 2025-01-26 LAB
OHS CV AF BURDEN PERCENT: < 1
OHS CV DC REMOTE DEVICE TYPE: NORMAL

## 2025-01-29 ENCOUNTER — TELEPHONE (OUTPATIENT)
Dept: FAMILY MEDICINE | Facility: CLINIC | Age: 70
End: 2025-01-29
Payer: MEDICARE

## 2025-01-29 DIAGNOSIS — H53.8 BLURRY VISION: Primary | ICD-10-CM

## 2025-01-29 NOTE — TELEPHONE ENCOUNTER
----- Message from [employer] sent at 1/29/2025  4:42 PM CST -----  Contact: NIKKIE LI [[id number]]  .Type:  Patient Requesting Call    Who Called:NIKKIE LI [[id number]]  Does the patient know what this is regarding?:eye sight going bad  Would the patient rather a call back or a response via [facility]? call  Best Call Back Number:.[phone] (home)     Additional Information:

## 2025-01-29 NOTE — TELEPHONE ENCOUNTER
----- Message from [employer] sent at 1/29/2025  4:42 PM CST -----  Contact: NIKKIE LI [[id number]]  .Type:  Patient Requesting Call    Who Called:NIKKIE LI [[id number]]  Does the patient know what this is regarding?:eye sight going bad  Would the patient rather a call back or a response via [facility]? call  Best Call Back Number:.[phone] (home)     Additional Information:

## 2025-01-29 NOTE — TELEPHONE ENCOUNTER
Returned patient's call. Patient states his vision is getting worse. 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 Wants to know if he should follow up with neurologist he has seen in the past of if we can send referral for ophthalmology. Pt is unable to see to read texts, do virtual visit or drive so he is wanting to know recommended next steps. Please advise.

## 2025-01-31 NOTE — TELEPHONE ENCOUNTER
Please fax      Orders Placed This Encounter   Procedures    Ambulatory referral/consult to Ophthalmology     Standing Status:   Future     Standing Expiration Date:   2/28/2026     Referral Priority:   Urgent     Referral Type:   Consultation     Referral Reason:   Specialty Services Required     Referred to Provider:   Jeanne Hidalgo OD     Requested Specialty:   Ophthalmology     Number of Visits Requested:   1

## 2025-01-31 NOTE — TELEPHONE ENCOUNTER
I spoke with the patient about this. Please review and sign pended order    Vision not acutely worse. ED precautions discussed  with patient.

## 2025-02-19 ENCOUNTER — CLINICAL SUPPORT (OUTPATIENT)
Dept: CARDIOLOGY | Facility: HOSPITAL | Age: 70
End: 2025-02-19

## 2025-02-19 ENCOUNTER — CLINICAL SUPPORT (OUTPATIENT)
Dept: CARDIOLOGY | Facility: HOSPITAL | Age: 70
End: 2025-02-19
Attending: INTERNAL MEDICINE
Payer: MEDICARE

## 2025-02-19 DIAGNOSIS — I49.8 OTHER SPECIFIED CARDIAC ARRHYTHMIAS: ICD-10-CM

## 2025-02-19 PROCEDURE — 93298 REM INTERROG DEV EVAL SCRMS: CPT | Performed by: INTERNAL MEDICINE

## 2025-02-19 PROCEDURE — 93298 REM INTERROG DEV EVAL SCRMS: CPT | Mod: 26,,, | Performed by: INTERNAL MEDICINE

## 2025-02-27 DIAGNOSIS — I70.0 AORTIC ATHEROSCLEROSIS: Primary | ICD-10-CM

## 2025-03-06 ENCOUNTER — TELEPHONE (OUTPATIENT)
Dept: CARDIOLOGY | Facility: HOSPITAL | Age: 70
End: 2025-03-06
Payer: MEDICARE

## 2025-03-22 ENCOUNTER — CLINICAL SUPPORT (OUTPATIENT)
Dept: CARDIOLOGY | Facility: HOSPITAL | Age: 70
End: 2025-03-22
Payer: MEDICARE

## 2025-03-22 ENCOUNTER — CLINICAL SUPPORT (OUTPATIENT)
Dept: CARDIOLOGY | Facility: HOSPITAL | Age: 70
End: 2025-03-22
Attending: INTERNAL MEDICINE
Payer: MEDICARE

## 2025-03-22 DIAGNOSIS — I49.8 OTHER SPECIFIED CARDIAC ARRHYTHMIAS: ICD-10-CM

## 2025-03-22 PROCEDURE — 93298 REM INTERROG DEV EVAL SCRMS: CPT | Performed by: INTERNAL MEDICINE

## 2025-03-22 PROCEDURE — 93298 REM INTERROG DEV EVAL SCRMS: CPT | Mod: 26,,, | Performed by: INTERNAL MEDICINE

## 2025-04-29 ENCOUNTER — CLINICAL SUPPORT (OUTPATIENT)
Dept: CARDIOLOGY | Facility: HOSPITAL | Age: 70
End: 2025-04-29
Attending: INTERNAL MEDICINE
Payer: MEDICARE

## 2025-04-29 ENCOUNTER — CLINICAL SUPPORT (OUTPATIENT)
Dept: CARDIOLOGY | Facility: HOSPITAL | Age: 70
End: 2025-04-29
Payer: MEDICARE

## 2025-04-29 DIAGNOSIS — I49.8 OTHER SPECIFIED CARDIAC ARRHYTHMIAS: ICD-10-CM

## 2025-04-29 PROCEDURE — 93298 REM INTERROG DEV EVAL SCRMS: CPT | Mod: 26,,, | Performed by: INTERNAL MEDICINE

## 2025-04-29 PROCEDURE — 93298 REM INTERROG DEV EVAL SCRMS: CPT | Performed by: INTERNAL MEDICINE

## 2025-05-13 DIAGNOSIS — I10 PRIMARY HYPERTENSION: ICD-10-CM

## 2025-05-13 DIAGNOSIS — E78.2 MIXED HYPERLIPIDEMIA: ICD-10-CM

## 2025-05-13 DIAGNOSIS — R73.03 PREDIABETES: ICD-10-CM

## 2025-05-13 NOTE — TELEPHONE ENCOUNTER
Care Due:                  Date            Visit Type   Department     Provider  --------------------------------------------------------------------------------    Last Visit: None Found      None         None Found  Next Visit: None Scheduled  None         None Found                                                            Last  Test          Frequency    Reason                     Performed    Due Date  --------------------------------------------------------------------------------    Office Visit  15 months..  lisinopriL, metFORMIN....  Not Found    Overdue    HBA1C.......  6 months...  metFORMIN................  04-   10-    [facility] Embedded Care Due Messages. Reference number: [id number].   5/13/2025 5:37:08 PM CDT

## 2025-05-14 RX ORDER — LISINOPRIL 20 MG/1
TABLET ORAL
Qty: 90 TABLET | Refills: 0 | Status: ON HOLD | OUTPATIENT
Start: 2025-05-14

## 2025-05-14 NOTE — TELEPHONE ENCOUNTER
Refill Routing Note   Medication(s) are not appropriate for processing by [facility] for the following reason(s):        Patient not seen by provider within 15 months  ED/Hospital Visit since last OV with provider    [facility] action(s):  Defer   Requires appointment : Yes   Requires labs : Yes             Appointments  past 12m or future 3m with PCP    Date Provider   Last Visit   4/13/2023 Sandy Sanches MD   Next Visit   Visit date not found Sandy Sanches MD   ED visits in past 90 days: 0        Note composed:7:51 AM 05/14/2025

## 2025-05-17 ENCOUNTER — HOSPITAL ENCOUNTER (INPATIENT)
Facility: HOSPITAL | Age: 70
LOS: 5 days | Discharge: HOME | DRG: 037 | End: 2025-05-22
Attending: EMERGENCY MEDICINE | Admitting: HOSPITALIST
Payer: MEDICARE

## 2025-05-17 DIAGNOSIS — I65.22 SYMPTOMATIC CAROTID ARTERY STENOSIS, LEFT: ICD-10-CM

## 2025-05-17 DIAGNOSIS — Z01.810 PREOP CARDIOVASCULAR EXAM: ICD-10-CM

## 2025-05-17 DIAGNOSIS — I65.29 CAROTID ARTERY STENOSIS: ICD-10-CM

## 2025-05-17 DIAGNOSIS — R07.9 CHEST PAIN: ICD-10-CM

## 2025-05-17 DIAGNOSIS — I65.22 SYMPTOMATIC CAROTID ARTERY STENOSIS, LEFT: Primary | ICD-10-CM

## 2025-05-17 DIAGNOSIS — R57.9 SHOCK: ICD-10-CM

## 2025-05-17 DIAGNOSIS — I65.22 LEFT CAROTID STENOSIS: ICD-10-CM

## 2025-05-17 DIAGNOSIS — Z86.73 HISTORY OF CVA (CEREBROVASCULAR ACCIDENT): ICD-10-CM

## 2025-05-17 LAB
HCV AB SERPL QL IA: NEGATIVE
HIV 1+2 AB+HIV1 P24 AG SERPL QL IA: NEGATIVE
HOLD SPECIMEN: NORMAL

## 2025-05-17 PROCEDURE — 99285 EMERGENCY DEPT VISIT HI MDM: CPT

## 2025-05-17 PROCEDURE — 87389 HIV-1 AG W/HIV-1&-2 AB AG IA: CPT | Performed by: EMERGENCY MEDICINE

## 2025-05-17 PROCEDURE — 11000001 HC ACUTE MED/SURG PRIVATE ROOM

## 2025-05-17 PROCEDURE — 21400001 HC TELEMETRY ROOM

## 2025-05-17 PROCEDURE — 86803 HEPATITIS C AB TEST: CPT | Performed by: EMERGENCY MEDICINE

## 2025-05-17 RX ORDER — ACETAMINOPHEN 325 MG/1
650 TABLET ORAL EVERY 6 HOURS PRN
Status: DISCONTINUED | OUTPATIENT
Start: 2025-05-17 | End: 2025-05-22 | Stop reason: HOSPADM

## 2025-05-17 RX ORDER — SIMETHICONE 80 MG
1 TABLET,CHEWABLE ORAL 4 TIMES DAILY PRN
Status: DISCONTINUED | OUTPATIENT
Start: 2025-05-17 | End: 2025-05-22 | Stop reason: HOSPADM

## 2025-05-17 RX ORDER — HYDROCODONE BITARTRATE AND ACETAMINOPHEN 5; 325 MG/1; MG/1
1 TABLET ORAL EVERY 6 HOURS PRN
Refills: 0 | Status: DISCONTINUED | OUTPATIENT
Start: 2025-05-17 | End: 2025-05-22 | Stop reason: HOSPADM

## 2025-05-17 RX ORDER — PROMETHAZINE HYDROCHLORIDE 25 MG/1
25 TABLET ORAL EVERY 6 HOURS PRN
Status: DISCONTINUED | OUTPATIENT
Start: 2025-05-17 | End: 2025-05-22 | Stop reason: HOSPADM

## 2025-05-17 RX ORDER — SODIUM CHLORIDE 0.9 % (FLUSH) 0.9 %
3 SYRINGE (ML) INJECTION EVERY 12 HOURS PRN
Status: DISCONTINUED | OUTPATIENT
Start: 2025-05-17 | End: 2025-05-22 | Stop reason: HOSPADM

## 2025-05-17 RX ORDER — GLUCAGON 1 MG
1 KIT INJECTION
Status: DISCONTINUED | OUTPATIENT
Start: 2025-05-17 | End: 2025-05-22 | Stop reason: HOSPADM

## 2025-05-17 RX ORDER — ONDANSETRON HYDROCHLORIDE 2 MG/ML
4 INJECTION, SOLUTION INTRAVENOUS EVERY 8 HOURS PRN
Status: DISCONTINUED | OUTPATIENT
Start: 2025-05-17 | End: 2025-05-22 | Stop reason: HOSPADM

## 2025-05-17 RX ORDER — INSULIN ASPART 100 [IU]/ML
0-5 INJECTION, SOLUTION INTRAVENOUS; SUBCUTANEOUS
Status: DISCONTINUED | OUTPATIENT
Start: 2025-05-17 | End: 2025-05-21

## 2025-05-17 RX ORDER — ACETAMINOPHEN 650 MG/1
650 SUPPOSITORY RECTAL EVERY 6 HOURS PRN
Status: DISCONTINUED | OUTPATIENT
Start: 2025-05-17 | End: 2025-05-22 | Stop reason: HOSPADM

## 2025-05-17 RX ORDER — ALUMINUM HYDROXIDE, MAGNESIUM HYDROXIDE, AND SIMETHICONE 1200; 120; 1200 MG/30ML; MG/30ML; MG/30ML
30 SUSPENSION ORAL 4 TIMES DAILY PRN
Status: DISCONTINUED | OUTPATIENT
Start: 2025-05-17 | End: 2025-05-22 | Stop reason: HOSPADM

## 2025-05-17 RX ORDER — IBUPROFEN 200 MG
24 TABLET ORAL
Status: DISCONTINUED | OUTPATIENT
Start: 2025-05-17 | End: 2025-05-22 | Stop reason: HOSPADM

## 2025-05-17 RX ORDER — HEPARIN SODIUM 5000 [USP'U]/ML
5000 INJECTION, SOLUTION INTRAVENOUS; SUBCUTANEOUS EVERY 8 HOURS
Status: DISCONTINUED | OUTPATIENT
Start: 2025-05-18 | End: 2025-05-22 | Stop reason: HOSPADM

## 2025-05-17 RX ORDER — TALC
6 POWDER (GRAM) TOPICAL NIGHTLY PRN
Status: DISCONTINUED | OUTPATIENT
Start: 2025-05-17 | End: 2025-05-22 | Stop reason: HOSPADM

## 2025-05-17 RX ORDER — NALOXONE HCL 0.4 MG/ML
0.02 VIAL (ML) INJECTION
Status: DISCONTINUED | OUTPATIENT
Start: 2025-05-17 | End: 2025-05-22 | Stop reason: HOSPADM

## 2025-05-17 RX ORDER — MORPHINE SULFATE 4 MG/ML
2 INJECTION, SOLUTION INTRAMUSCULAR; INTRAVENOUS EVERY 4 HOURS PRN
Refills: 0 | Status: DISCONTINUED | OUTPATIENT
Start: 2025-05-17 | End: 2025-05-22 | Stop reason: HOSPADM

## 2025-05-17 RX ORDER — POLYETHYLENE GLYCOL 3350 17 G/17G
17 POWDER, FOR SOLUTION ORAL DAILY PRN
Status: DISCONTINUED | OUTPATIENT
Start: 2025-05-17 | End: 2025-05-22 | Stop reason: HOSPADM

## 2025-05-17 RX ORDER — IBUPROFEN 200 MG
16 TABLET ORAL
Status: DISCONTINUED | OUTPATIENT
Start: 2025-05-17 | End: 2025-05-22 | Stop reason: HOSPADM

## 2025-05-18 PROBLEM — I65.22 SYMPTOMATIC CAROTID ARTERY STENOSIS, LEFT: Status: ACTIVE | Noted: 2025-05-18

## 2025-05-18 PROBLEM — E66.9 OBESITY: Status: ACTIVE | Noted: 2025-05-18

## 2025-05-18 LAB
OHS CV AF BURDEN PERCENT: < 1
OHS CV DC REMOTE DEVICE TYPE: NORMAL
POCT GLUCOSE: 130 MG/DL (ref 70–110)

## 2025-05-18 PROCEDURE — 63600175 PHARM REV CODE 636 W HCPCS: Performed by: NURSE PRACTITIONER

## 2025-05-18 PROCEDURE — 21400001 HC TELEMETRY ROOM

## 2025-05-18 PROCEDURE — 25000003 PHARM REV CODE 250: Performed by: NURSE PRACTITIONER

## 2025-05-18 RX ORDER — GABAPENTIN 300 MG/1
600 CAPSULE ORAL 3 TIMES DAILY
Status: DISCONTINUED | OUTPATIENT
Start: 2025-05-18 | End: 2025-05-20

## 2025-05-18 RX ORDER — CYCLOBENZAPRINE HCL 10 MG
10 TABLET ORAL 3 TIMES DAILY
Status: DISCONTINUED | OUTPATIENT
Start: 2025-05-18 | End: 2025-05-20

## 2025-05-18 RX ORDER — LISINOPRIL 20 MG/1
20 TABLET ORAL DAILY
Status: DISCONTINUED | OUTPATIENT
Start: 2025-05-18 | End: 2025-05-20

## 2025-05-18 RX ADMIN — GABAPENTIN 600 MG: 300 CAPSULE ORAL at 03:05

## 2025-05-18 RX ADMIN — HEPARIN SODIUM 5000 UNITS: 5000 INJECTION INTRAVENOUS; SUBCUTANEOUS at 09:05

## 2025-05-18 RX ADMIN — ALUMINUM HYDROXIDE, MAGNESIUM HYDROXIDE, AND DIMETHICONE 30 ML: 200; 20; 200 SUSPENSION ORAL at 12:05

## 2025-05-18 RX ADMIN — CYCLOBENZAPRINE HYDROCHLORIDE 10 MG: 10 TABLET, FILM COATED ORAL at 10:05

## 2025-05-18 RX ADMIN — GABAPENTIN 600 MG: 300 CAPSULE ORAL at 02:05

## 2025-05-18 RX ADMIN — GABAPENTIN 600 MG: 300 CAPSULE ORAL at 10:05

## 2025-05-18 RX ADMIN — HEPARIN SODIUM 5000 UNITS: 5000 INJECTION INTRAVENOUS; SUBCUTANEOUS at 03:05

## 2025-05-18 RX ADMIN — GABAPENTIN 600 MG: 300 CAPSULE ORAL at 08:05

## 2025-05-18 RX ADMIN — LISINOPRIL 20 MG: 20 TABLET ORAL at 10:05

## 2025-05-18 RX ADMIN — HEPARIN SODIUM 5000 UNITS: 5000 INJECTION INTRAVENOUS; SUBCUTANEOUS at 06:05

## 2025-05-18 RX ADMIN — CYCLOBENZAPRINE HYDROCHLORIDE 10 MG: 10 TABLET, FILM COATED ORAL at 02:05

## 2025-05-18 RX ADMIN — CYCLOBENZAPRINE HYDROCHLORIDE 10 MG: 10 TABLET, FILM COATED ORAL at 08:05

## 2025-05-19 PROBLEM — Z01.810 PREOP CARDIOVASCULAR EXAM: Status: ACTIVE | Noted: 2025-05-19

## 2025-05-19 LAB
POCT GLUCOSE: 101 MG/DL (ref 70–110)
POCT GLUCOSE: 119 MG/DL (ref 70–110)
POCT GLUCOSE: 124 MG/DL (ref 70–110)

## 2025-05-19 PROCEDURE — 63600175 PHARM REV CODE 636 W HCPCS: Performed by: NURSE PRACTITIONER

## 2025-05-19 PROCEDURE — 99222 1ST HOSP IP/OBS MODERATE 55: CPT | Mod: ,,, | Performed by: INTERNAL MEDICINE

## 2025-05-19 PROCEDURE — 93010 ELECTROCARDIOGRAM REPORT: CPT | Mod: ,,, | Performed by: INTERNAL MEDICINE

## 2025-05-19 PROCEDURE — 21400001 HC TELEMETRY ROOM

## 2025-05-19 PROCEDURE — 25000003 PHARM REV CODE 250: Performed by: STUDENT IN AN ORGANIZED HEALTH CARE EDUCATION/TRAINING PROGRAM

## 2025-05-19 PROCEDURE — 93005 ELECTROCARDIOGRAM TRACING: CPT

## 2025-05-19 PROCEDURE — 25000003 PHARM REV CODE 250: Performed by: NURSE PRACTITIONER

## 2025-05-19 PROCEDURE — 99232 SBSQ HOSP IP/OBS MODERATE 35: CPT | Mod: 57,ICN,, | Performed by: STUDENT IN AN ORGANIZED HEALTH CARE EDUCATION/TRAINING PROGRAM

## 2025-05-19 RX ORDER — CLOPIDOGREL BISULFATE 75 MG/1
75 TABLET ORAL DAILY
Status: DISCONTINUED | OUTPATIENT
Start: 2025-05-19 | End: 2025-05-22 | Stop reason: HOSPADM

## 2025-05-19 RX ORDER — ASPIRIN 81 MG/1
81 TABLET ORAL DAILY
Status: DISCONTINUED | OUTPATIENT
Start: 2025-05-19 | End: 2025-05-22 | Stop reason: HOSPADM

## 2025-05-19 RX ADMIN — HEPARIN SODIUM 5000 UNITS: 5000 INJECTION INTRAVENOUS; SUBCUTANEOUS at 02:05

## 2025-05-19 RX ADMIN — GABAPENTIN 600 MG: 300 CAPSULE ORAL at 08:05

## 2025-05-19 RX ADMIN — GABAPENTIN 600 MG: 300 CAPSULE ORAL at 02:05

## 2025-05-19 RX ADMIN — CYCLOBENZAPRINE HYDROCHLORIDE 10 MG: 10 TABLET, FILM COATED ORAL at 09:05

## 2025-05-19 RX ADMIN — GABAPENTIN 600 MG: 300 CAPSULE ORAL at 09:05

## 2025-05-19 RX ADMIN — ASPIRIN 81 MG: 81 TABLET, COATED ORAL at 09:05

## 2025-05-19 RX ADMIN — CLOPIDOGREL 75 MG: 75 TABLET ORAL at 09:05

## 2025-05-19 RX ADMIN — HEPARIN SODIUM 5000 UNITS: 5000 INJECTION INTRAVENOUS; SUBCUTANEOUS at 05:05

## 2025-05-19 RX ADMIN — LISINOPRIL 20 MG: 20 TABLET ORAL at 09:05

## 2025-05-19 RX ADMIN — CYCLOBENZAPRINE HYDROCHLORIDE 10 MG: 10 TABLET, FILM COATED ORAL at 02:05

## 2025-05-19 RX ADMIN — CYCLOBENZAPRINE HYDROCHLORIDE 10 MG: 10 TABLET, FILM COATED ORAL at 08:05

## 2025-05-19 RX ADMIN — HEPARIN SODIUM 5000 UNITS: 5000 INJECTION INTRAVENOUS; SUBCUTANEOUS at 09:05

## 2025-05-20 ENCOUNTER — ANESTHESIA EVENT (OUTPATIENT)
Dept: SURGERY | Facility: HOSPITAL | Age: 70
End: 2025-05-20
Payer: MEDICARE

## 2025-05-20 ENCOUNTER — ANESTHESIA (OUTPATIENT)
Dept: SURGERY | Facility: HOSPITAL | Age: 70
End: 2025-05-20
Payer: MEDICARE

## 2025-05-20 PROBLEM — R09.89 HEMODYNAMIC INSTABILITY: Status: ACTIVE | Noted: 2025-05-20

## 2025-05-20 PROBLEM — R57.9 SHOCK: Status: ACTIVE | Noted: 2025-05-20

## 2025-05-20 LAB
ABSOLUTE EOSINOPHIL (OHS): 0.01 K/UL
ABSOLUTE MONOCYTE (OHS): 0.1 K/UL (ref 0.3–1)
ABSOLUTE NEUTROPHIL COUNT (OHS): 17.03 K/UL (ref 1.8–7.7)
ALBUMIN SERPL BCP-MCNC: 3.2 G/DL (ref 3.5–5.2)
ALP SERPL-CCNC: 72 UNIT/L (ref 40–150)
ALT SERPL W/O P-5'-P-CCNC: 38 UNIT/L (ref 10–44)
ANION GAP (OHS): 12 MMOL/L (ref 8–16)
AST SERPL-CCNC: 35 UNIT/L (ref 11–45)
BASOPHILS # BLD AUTO: 0.04 K/UL
BASOPHILS NFR BLD AUTO: 0.2 %
BILIRUB SERPL-MCNC: 0.2 MG/DL (ref 0.1–1)
BUN SERPL-MCNC: 25 MG/DL (ref 8–23)
CALCIUM SERPL-MCNC: 9.1 MG/DL (ref 8.7–10.5)
CHLORIDE SERPL-SCNC: 104 MMOL/L (ref 95–110)
CO2 SERPL-SCNC: 20 MMOL/L (ref 23–29)
CREAT SERPL-MCNC: 1.4 MG/DL (ref 0.5–1.4)
ERYTHROCYTE [DISTWIDTH] IN BLOOD BY AUTOMATED COUNT: 13.2 % (ref 11.5–14.5)
GFR SERPLBLD CREATININE-BSD FMLA CKD-EPI: 54 ML/MIN/1.73/M2
GLUCOSE SERPL-MCNC: 246 MG/DL (ref 70–110)
HCT VFR BLD AUTO: 42.9 % (ref 40–54)
HGB BLD-MCNC: 13.9 GM/DL (ref 14–18)
IMM GRANULOCYTES # BLD AUTO: 0.18 K/UL (ref 0–0.04)
IMM GRANULOCYTES NFR BLD AUTO: 1 % (ref 0–0.5)
LYMPHOCYTES # BLD AUTO: 0.56 K/UL (ref 1–4.8)
MAGNESIUM SERPL-MCNC: 1.9 MG/DL (ref 1.6–2.6)
MCH RBC QN AUTO: 29.7 PG (ref 27–31)
MCHC RBC AUTO-ENTMCNC: 32.4 G/DL (ref 32–36)
MCV RBC AUTO: 92 FL (ref 82–98)
NUCLEATED RBC (/100WBC) (OHS): 0 /100 WBC
OHS QRS DURATION: 70 MS
OHS QTC CALCULATION: 396 MS
PHOSPHATE SERPL-MCNC: 3.4 MG/DL (ref 2.7–4.5)
PLATELET # BLD AUTO: 305 K/UL (ref 150–450)
PMV BLD AUTO: 10.9 FL (ref 9.2–12.9)
POCT GLUCOSE: 107 MG/DL (ref 70–110)
POCT GLUCOSE: 107 MG/DL (ref 70–110)
POCT GLUCOSE: 170 MG/DL (ref 70–110)
POCT GLUCOSE: 188 MG/DL (ref 70–110)
POCT GLUCOSE: 223 MG/DL (ref 70–110)
POTASSIUM SERPL-SCNC: 4.6 MMOL/L (ref 3.5–5.1)
PROT SERPL-MCNC: 7 GM/DL (ref 6–8.4)
RBC # BLD AUTO: 4.68 M/UL (ref 4.6–6.2)
RELATIVE EOSINOPHIL (OHS): 0.1 %
RELATIVE LYMPHOCYTE (OHS): 3.1 % (ref 18–48)
RELATIVE MONOCYTE (OHS): 0.6 % (ref 4–15)
RELATIVE NEUTROPHIL (OHS): 95 % (ref 38–73)
SODIUM SERPL-SCNC: 136 MMOL/L (ref 136–145)
WBC # BLD AUTO: 17.92 K/UL (ref 3.9–12.7)

## 2025-05-20 PROCEDURE — 20000000 HC ICU ROOM

## 2025-05-20 PROCEDURE — 51702 INSERT TEMP BLADDER CATH: CPT

## 2025-05-20 PROCEDURE — 25000003 PHARM REV CODE 250: Performed by: NURSE PRACTITIONER

## 2025-05-20 PROCEDURE — 25000003 PHARM REV CODE 250

## 2025-05-20 PROCEDURE — 27800903 OPTIME MED/SURG SUP & DEVICES OTHER IMPLANTS: Performed by: STUDENT IN AN ORGANIZED HEALTH CARE EDUCATION/TRAINING PROGRAM

## 2025-05-20 PROCEDURE — C1768 GRAFT, VASCULAR: HCPCS | Performed by: STUDENT IN AN ORGANIZED HEALTH CARE EDUCATION/TRAINING PROGRAM

## 2025-05-20 PROCEDURE — 63600175 PHARM REV CODE 636 W HCPCS: Performed by: STUDENT IN AN ORGANIZED HEALTH CARE EDUCATION/TRAINING PROGRAM

## 2025-05-20 PROCEDURE — 27201423 OPTIME MED/SURG SUP & DEVICES STERILE SUPPLY: Performed by: STUDENT IN AN ORGANIZED HEALTH CARE EDUCATION/TRAINING PROGRAM

## 2025-05-20 PROCEDURE — 83735 ASSAY OF MAGNESIUM: CPT

## 2025-05-20 PROCEDURE — 03CL0ZZ EXTIRPATION OF MATTER FROM LEFT INTERNAL CAROTID ARTERY, OPEN APPROACH: ICD-10-PCS | Performed by: STUDENT IN AN ORGANIZED HEALTH CARE EDUCATION/TRAINING PROGRAM

## 2025-05-20 PROCEDURE — 37000008 HC ANESTHESIA 1ST 15 MINUTES: Performed by: STUDENT IN AN ORGANIZED HEALTH CARE EDUCATION/TRAINING PROGRAM

## 2025-05-20 PROCEDURE — 36000707: Performed by: STUDENT IN AN ORGANIZED HEALTH CARE EDUCATION/TRAINING PROGRAM

## 2025-05-20 PROCEDURE — 25000003 PHARM REV CODE 250: Performed by: STUDENT IN AN ORGANIZED HEALTH CARE EDUCATION/TRAINING PROGRAM

## 2025-05-20 PROCEDURE — 36000706: Performed by: STUDENT IN AN ORGANIZED HEALTH CARE EDUCATION/TRAINING PROGRAM

## 2025-05-20 PROCEDURE — 03UL0JZ SUPPLEMENT LEFT INTERNAL CAROTID ARTERY WITH SYNTHETIC SUBSTITUTE, OPEN APPROACH: ICD-10-PCS | Performed by: STUDENT IN AN ORGANIZED HEALTH CARE EDUCATION/TRAINING PROGRAM

## 2025-05-20 PROCEDURE — 80053 COMPREHEN METABOLIC PANEL: CPT

## 2025-05-20 PROCEDURE — 71000033 HC RECOVERY, INTIAL HOUR: Performed by: STUDENT IN AN ORGANIZED HEALTH CARE EDUCATION/TRAINING PROGRAM

## 2025-05-20 PROCEDURE — 85025 COMPLETE CBC W/AUTO DIFF WBC: CPT

## 2025-05-20 PROCEDURE — 35301 RECHANNELING OF ARTERY: CPT | Mod: LT,,, | Performed by: STUDENT IN AN ORGANIZED HEALTH CARE EDUCATION/TRAINING PROGRAM

## 2025-05-20 PROCEDURE — 84100 ASSAY OF PHOSPHORUS: CPT

## 2025-05-20 PROCEDURE — 63600175 PHARM REV CODE 636 W HCPCS: Performed by: NURSE PRACTITIONER

## 2025-05-20 PROCEDURE — 37000009 HC ANESTHESIA EA ADD 15 MINS: Performed by: STUDENT IN AN ORGANIZED HEALTH CARE EDUCATION/TRAINING PROGRAM

## 2025-05-20 DEVICE — PATCH BOVINE 1 X 6: Type: IMPLANTABLE DEVICE | Site: NECK | Status: FUNCTIONAL

## 2025-05-20 DEVICE — KIT SHUNT ARTERY 6: Type: IMPLANTABLE DEVICE | Site: NECK | Status: FUNCTIONAL

## 2025-05-20 RX ORDER — NOREPINEPHRINE BITARTRATE/D5W 4MG/250ML
0-.2 PLASTIC BAG, INJECTION (ML) INTRAVENOUS CONTINUOUS
Status: DISCONTINUED | OUTPATIENT
Start: 2025-05-20 | End: 2025-05-20

## 2025-05-20 RX ORDER — GLUCAGON 1 MG
1 KIT INJECTION
Status: DISCONTINUED | OUTPATIENT
Start: 2025-05-20 | End: 2025-05-20 | Stop reason: HOSPADM

## 2025-05-20 RX ORDER — ONDANSETRON HYDROCHLORIDE 2 MG/ML
INJECTION, SOLUTION INTRAVENOUS
Status: DISCONTINUED | OUTPATIENT
Start: 2025-05-20 | End: 2025-05-20

## 2025-05-20 RX ORDER — EPHEDRINE SULFATE 50 MG/ML
INJECTION, SOLUTION INTRAVENOUS
Status: DISCONTINUED | OUTPATIENT
Start: 2025-05-20 | End: 2025-05-20

## 2025-05-20 RX ORDER — SUCCINYLCHOLINE CHLORIDE 20 MG/ML
INJECTION INTRAMUSCULAR; INTRAVENOUS
Status: DISCONTINUED | OUTPATIENT
Start: 2025-05-20 | End: 2025-05-20

## 2025-05-20 RX ORDER — NOREPINEPHRINE BITARTRATE 0.02 MG/ML
0-.2 INJECTION, SOLUTION INTRAVENOUS CONTINUOUS
Status: DISCONTINUED | OUTPATIENT
Start: 2025-05-20 | End: 2025-05-20

## 2025-05-20 RX ORDER — PROTAMINE SULFATE 10 MG/ML
INJECTION, SOLUTION INTRAVENOUS
Status: DISCONTINUED | OUTPATIENT
Start: 2025-05-20 | End: 2025-05-20

## 2025-05-20 RX ORDER — NOREPINEPHRINE BITARTRATE 0.02 MG/ML
0-.2 INJECTION, SOLUTION INTRAVENOUS CONTINUOUS
Status: DISCONTINUED | OUTPATIENT
Start: 2025-05-20 | End: 2025-05-21

## 2025-05-20 RX ORDER — GABAPENTIN 100 MG/1
100 CAPSULE ORAL 3 TIMES DAILY
Status: DISCONTINUED | OUTPATIENT
Start: 2025-05-20 | End: 2025-05-22 | Stop reason: HOSPADM

## 2025-05-20 RX ORDER — FENTANYL CITRATE 50 UG/ML
INJECTION, SOLUTION INTRAMUSCULAR; INTRAVENOUS
Status: DISCONTINUED | OUTPATIENT
Start: 2025-05-20 | End: 2025-05-20

## 2025-05-20 RX ORDER — HEPARIN SODIUM 1000 [USP'U]/ML
INJECTION, SOLUTION INTRAVENOUS; SUBCUTANEOUS
Status: DISCONTINUED | OUTPATIENT
Start: 2025-05-20 | End: 2025-05-20 | Stop reason: HOSPADM

## 2025-05-20 RX ORDER — DEXAMETHASONE SODIUM PHOSPHATE 4 MG/ML
INJECTION, SOLUTION INTRA-ARTICULAR; INTRALESIONAL; INTRAMUSCULAR; INTRAVENOUS; SOFT TISSUE
Status: DISCONTINUED | OUTPATIENT
Start: 2025-05-20 | End: 2025-05-20

## 2025-05-20 RX ORDER — EZETIMIBE 10 MG/1
10 TABLET ORAL NIGHTLY
Status: DISCONTINUED | OUTPATIENT
Start: 2025-05-20 | End: 2025-05-22 | Stop reason: HOSPADM

## 2025-05-20 RX ORDER — PROPOFOL 10 MG/ML
VIAL (ML) INTRAVENOUS
Status: DISCONTINUED | OUTPATIENT
Start: 2025-05-20 | End: 2025-05-20

## 2025-05-20 RX ORDER — ROCURONIUM BROMIDE 10 MG/ML
INJECTION, SOLUTION INTRAVENOUS
Status: DISCONTINUED | OUTPATIENT
Start: 2025-05-20 | End: 2025-05-20

## 2025-05-20 RX ORDER — LIDOCAINE HYDROCHLORIDE 10 MG/ML
INJECTION, SOLUTION EPIDURAL; INFILTRATION; INTRACAUDAL; PERINEURAL
Status: DISCONTINUED | OUTPATIENT
Start: 2025-05-20 | End: 2025-05-20 | Stop reason: HOSPADM

## 2025-05-20 RX ORDER — MIDAZOLAM HYDROCHLORIDE 1 MG/ML
INJECTION INTRAMUSCULAR; INTRAVENOUS
Status: DISCONTINUED | OUTPATIENT
Start: 2025-05-20 | End: 2025-05-20

## 2025-05-20 RX ORDER — OXYCODONE AND ACETAMINOPHEN 5; 325 MG/1; MG/1
1 TABLET ORAL
Status: DISCONTINUED | OUTPATIENT
Start: 2025-05-20 | End: 2025-05-20 | Stop reason: HOSPADM

## 2025-05-20 RX ORDER — ONDANSETRON HYDROCHLORIDE 2 MG/ML
4 INJECTION, SOLUTION INTRAVENOUS DAILY PRN
Status: DISCONTINUED | OUTPATIENT
Start: 2025-05-20 | End: 2025-05-20 | Stop reason: HOSPADM

## 2025-05-20 RX ORDER — HYDROMORPHONE HYDROCHLORIDE 2 MG/ML
0.2 INJECTION, SOLUTION INTRAMUSCULAR; INTRAVENOUS; SUBCUTANEOUS EVERY 5 MIN PRN
Status: DISCONTINUED | OUTPATIENT
Start: 2025-05-20 | End: 2025-05-20 | Stop reason: HOSPADM

## 2025-05-20 RX ADMIN — HYDROCODONE BITARTRATE AND ACETAMINOPHEN 1 TABLET: 5; 325 TABLET ORAL at 06:05

## 2025-05-20 RX ADMIN — PROTAMINE SULFATE 20 MG: 10 INJECTION, SOLUTION INTRAVENOUS at 04:05

## 2025-05-20 RX ADMIN — MIDAZOLAM HYDROCHLORIDE 2 MG: 1 INJECTION, SOLUTION INTRAMUSCULAR; INTRAVENOUS at 02:05

## 2025-05-20 RX ADMIN — SUCCINYLCHOLINE CHLORIDE 120 MG: 20 INJECTION, SOLUTION INTRAMUSCULAR; INTRAVENOUS; PARENTERAL at 02:05

## 2025-05-20 RX ADMIN — MORPHINE SULFATE 2 MG: 4 INJECTION INTRAVENOUS at 08:05

## 2025-05-20 RX ADMIN — PROPOFOL 50 MG: 10 INJECTION, EMULSION INTRAVENOUS at 03:05

## 2025-05-20 RX ADMIN — CLOPIDOGREL 75 MG: 75 TABLET ORAL at 08:05

## 2025-05-20 RX ADMIN — NOREPINEPHRINE BITARTRATE 0.04 MCG/KG/MIN: 0.02 INJECTION, SOLUTION INTRAVENOUS at 06:05

## 2025-05-20 RX ADMIN — INSULIN ASPART 1 UNITS: 100 INJECTION, SOLUTION INTRAVENOUS; SUBCUTANEOUS at 08:05

## 2025-05-20 RX ADMIN — FENTANYL CITRATE 25 MCG: 50 INJECTION, SOLUTION INTRAMUSCULAR; INTRAVENOUS at 02:05

## 2025-05-20 RX ADMIN — ROCURONIUM BROMIDE 20 MG: 10 SOLUTION INTRAVENOUS at 03:05

## 2025-05-20 RX ADMIN — HEPARIN SODIUM 5000 UNITS: 5000 INJECTION INTRAVENOUS; SUBCUTANEOUS at 09:05

## 2025-05-20 RX ADMIN — LISINOPRIL 20 MG: 20 TABLET ORAL at 09:05

## 2025-05-20 RX ADMIN — EPHEDRINE SULFATE 25 MG: 50 INJECTION INTRAVENOUS at 02:05

## 2025-05-20 RX ADMIN — SODIUM CHLORIDE, SODIUM LACTATE, POTASSIUM CHLORIDE, AND CALCIUM CHLORIDE: .6; .31; .03; .02 INJECTION, SOLUTION INTRAVENOUS at 02:05

## 2025-05-20 RX ADMIN — EZETIMIBE 10 MG: 10 TABLET ORAL at 08:05

## 2025-05-20 RX ADMIN — ROCURONIUM BROMIDE 45 MG: 10 SOLUTION INTRAVENOUS at 02:05

## 2025-05-20 RX ADMIN — ASPIRIN 81 MG: 81 TABLET, COATED ORAL at 09:05

## 2025-05-20 RX ADMIN — PROPOFOL 100 MG: 10 INJECTION, EMULSION INTRAVENOUS at 02:05

## 2025-05-20 RX ADMIN — GABAPENTIN 100 MG: 100 CAPSULE ORAL at 08:05

## 2025-05-20 RX ADMIN — FENTANYL CITRATE 25 MCG: 50 INJECTION, SOLUTION INTRAMUSCULAR; INTRAVENOUS at 03:05

## 2025-05-20 RX ADMIN — EPHEDRINE SULFATE 10 MG: 50 INJECTION INTRAVENOUS at 02:05

## 2025-05-20 RX ADMIN — SUGAMMADEX 400 MG: 100 INJECTION, SOLUTION INTRAVENOUS at 04:05

## 2025-05-20 RX ADMIN — ROCURONIUM BROMIDE 5 MG: 10 SOLUTION INTRAVENOUS at 02:05

## 2025-05-20 RX ADMIN — EPHEDRINE SULFATE 15 MG: 50 INJECTION INTRAVENOUS at 03:05

## 2025-05-20 RX ADMIN — DEXAMETHASONE SODIUM PHOSPHATE 8 MG: 4 INJECTION, SOLUTION INTRA-ARTICULAR; INTRALESIONAL; INTRAMUSCULAR; INTRAVENOUS; SOFT TISSUE at 02:05

## 2025-05-20 RX ADMIN — PROPOFOL 50 MG: 10 INJECTION, EMULSION INTRAVENOUS at 02:05

## 2025-05-20 RX ADMIN — CYCLOBENZAPRINE HYDROCHLORIDE 10 MG: 10 TABLET, FILM COATED ORAL at 09:05

## 2025-05-20 RX ADMIN — NOREPINEPHRINE BITARTRATE 0.02 MCG/KG/MIN: 1 INJECTION, SOLUTION, CONCENTRATE INTRAVENOUS at 02:05

## 2025-05-20 RX ADMIN — ONDANSETRON 4 MG: 2 INJECTION INTRAMUSCULAR; INTRAVENOUS at 02:05

## 2025-05-20 RX ADMIN — GABAPENTIN 600 MG: 300 CAPSULE ORAL at 09:05

## 2025-05-20 RX ADMIN — HEPARIN SODIUM 11000 UNITS: 1000 INJECTION, SOLUTION INTRAVENOUS; SUBCUTANEOUS at 03:05

## 2025-05-20 NOTE — ANESTHESIA PREPROCEDURE EVALUATION
05/20/2025  Javier Harish Barrera is a 69 y.o., male.   PMH has a past medical history of Diabetes mellitus, type 2, Hyperlipidemia, Hypertension, Right sided sciatica, and CVA is planned for   L CEA for symptomatic carotid stenosis ( > 90%).    Past Medical History:   Diagnosis Date    Diabetes mellitus, type 2     Hyperlipidemia     Hypertension     Right sided sciatica     Stroke      Past Surgical History:   Procedure Laterality Date    HEMORRHOID SURGERY      INSERTION OF IMPLANTABLE LOOP RECORDER Left 2/2/2022    Procedure: INSERTION, IMPLANTABLE LOOP RECORDER;  Surgeon: Jairon Barahona MD;  Location: [facility] CATH LAB;  Service: Cardiology;  Laterality: Left;  scheduled for lukasz and loop implant in [facility] ([facility])     Preop cardiovascular exam  69 y.o. male with a PMH has a past medical history of Diabetes mellitus, type 2, Hyperlipidemia, Hypertension, Right sided sciatica, and CVA is planned for   L CEA for symptomatic carotid stenosis ( > 90%). Patient denies CP, angina or anginal equivalent.EKG NSR, nonspecific St-t changes, no changes from 2023    Pt cleared for procedure/surgery at moderate CV risk. Pt can hold antiplatelets for surgery per surgery.      Chemistry        Component Value Date/Time     05/17/2025 0350     10/17/2024 1543    K 3.8 05/17/2025 0350    K 4.7 10/17/2024 1543     10/17/2024 1543    CO2 26 05/17/2025 0350    CO2 25 10/17/2024 1543    BUN 24 (H) 05/17/2025 0350    BUN 26 (H) 10/17/2024 1543    CREATININE 1.01 05/17/2025 0350    CREATININE 1.3 10/17/2024 1543     10/17/2024 1543        Component Value Date/Time    CALCIUM 9.2 05/17/2025 0350    CALCIUM 9.0 10/17/2024 1543    ALKPHOS 66 05/17/2025 0350    ALKPHOS 79 10/17/2024 1543    AST 29 05/17/2025 0350    AST 30 10/17/2024 1543    ALT 38 05/17/2025 0350    ALT 30 10/17/2024 1543    BILITOT 0.2 (L)  05/17/2025 0350    BILITOT 0.3 10/17/2024 1543    ESTGFRAFRICA >60 02/02/2022 0807    EGFRNONAA >60 02/02/2022 0807        Lab Results   Component Value Date    WBC 10.16 10/17/2024    HGB 13.7 (L) 10/17/2024    HCT 44.0 10/17/2024    MCV 98 10/17/2024     10/17/2024     EXAMINATION:  US CAROTID BILATERAL     CLINICAL HISTORY:  stroke;     TECHNIQUE:  Grayscale and color Doppler ultrasound examination of the carotid and vertebral artery systems bilaterally.  Stenosis estimates are per the NASCET measurement criteria.     COMPARISON:  None.     FINDINGS:  Right:     Internal Carotid Artery (ICA) peak systolic velocity 122 cm/sec     ICA/CCA peak systolic velocity ratio: 2.3     Plaque formation: Heterogeneous     Vertebral artery: Antegrade flow and normal waveform.     Left:     Internal Carotid Artery (ICA)  peak systolic velocity 200 cm/sec     ICA/CCA peak systolic velocity ratio: 3.0     Plaque formation: Heterogeneous     Vertebral artery: Antegrade flow and normal waveform.     Impression:     50-69% stenosis on the left.  Suspect stenosis on the order of 50% on the right as well.  Consider CTA neck for better evaluation.     This report was flagged in Epic as abnormal.    ECHO (2022):  Summary    Normal systolic function.  The estimated ejection fraction is 60%.  Normal left ventricular diastolic function.  Normal right ventricular size with normal right ventricular systolic function.  No interatrial septal defect present.  Normal appearing left atrial appendage. No thrombus is present in the appendage. Normal appendage velocities.  There is mild aortic valve stenosis.    Pre-op Assessment    I have reviewed the Patient Summary Reports.    I have reviewed the NPO Status.   I have reviewed the Medications.     Review of Systems  Anesthesia Hx:  No problems with previous Anesthesia   History of prior surgery of interest to airway management or planning:  Previous anesthesia: MAC          Denies Personal  Hx of Anesthesia complications.                    Social:  Former Smoker       Hematology/Oncology:  Hematology Normal   Oncology Normal                                   Cardiovascular:     Hypertension              ECG has been reviewed. Normal sinus rhythm   ST and T wave abnormality, consider inferior ischemia   ST and T wave abnormality, consider anterolateral ischemia   Abnormal ECG   When compared with ECG of 27-Aug-2023 21:49,   No significant change was found                              Pulmonary:  Pulmonary Normal                       Renal/:  Renal/ Normal                 Musculoskeletal:         Spine Disorders: lumbar Chronic Pain           Neurological:   CVA, residual symptoms Neuromuscular Disease,       Patient reports fatigue, right sided weakness    MRI Brain with left parietal lobe remote cortical infarct                              Endocrine:  Diabetes, type 2   BMI 36      Obesity / BMI > 30      Physical Exam  General: Well nourished, Cooperative, Alert and Oriented    Airway:  Mallampati: III   Mouth Opening: Normal  TM Distance: Normal  Tongue: Normal  Neck ROM: Normal ROM    Dental:  Edentulous        Anesthesia Plan  Type of Anesthesia, risks & benefits discussed:    Anesthesia Type: Gen ETT  Intra-op Monitoring Plan: Standard ASA Monitors and Art Line  Post Op Pain Control Plan: multimodal analgesia and IV/PO Opioids PRN  Induction:  IV  Airway Plan: Direct, Post-Induction  Informed Consent: Informed consent signed with the Patient representative and all parties understand the risks and agree with anesthesia plan.  All questions answered.   ASA Score: 3  Day of Surgery Review of History & Physical: H&P Update referred to the surgeon/provider.  Anesthesia Plan Notes: *Paper consent signed by sister (Makeda Barrera) as patient was already given Versed prior to written consent; patient voiced verbal consent prior to sedation    Ready For Surgery From Anesthesia Perspective.     .

## 2025-05-20 NOTE — ANESTHESIA PROCEDURE NOTES
Arterial    Diagnosis: carotid stenosis    Patient location during procedure: done in OR  Timeout: 5/20/2025 2:30 PM  Procedure end time: 5/20/2025 2:41 PM    Staffing  Authorizing Provider: Fahad Mari MD  Performing Provider: Fahad Mari MD    Staffing  Performed by: Fahad Mari MD  Authorized by: Fahad Mari MD    Anesthesiologist was present at the time of the procedure.    Preanesthetic Checklist  Completed: patient identified, IV checked, site marked, risks and benefits discussed, surgical consent, monitors and equipment checked, pre-op evaluation, timeout performed and anesthesia consent givenArterial  Skin Prep: chlorhexidine gluconate  Local Infiltration: none  Orientation: right  Location: radial    Catheter Size: 20 G  Catheter placement by Ultrasound guidance. Heme positive aspiration all ports.   Vessel Caliber: medium, patent, compressibility normal  Vascular Doppler:  not done  Needle advanced into vessel with real time Ultrasound guidance.Insertion Attempts: 2  Assessment  Dressing: secured with tape and tegaderm  Patient: Tolerated well  Additional Notes  First attempt (palpation) unsuccessful; 2nd attempt via U/S successful

## 2025-05-20 NOTE — ANESTHESIA PROCEDURE NOTES
Intubation    Date/Time: 5/20/2025 2:25 PM    Performed by: Deepak Guidry CRNA  Authorized by: Fahad Mari MD    Intubation:     Induction:  Intravenous    Intubated:  Postinduction    Mask Ventilation:  Easy mask    Attempts:  1    Attempted By:  CRNA    Method of Intubation:  Direct    Blade:  Mavis 3    Laryngeal View Grade: Grade I - full view of cords      Difficult Airway Encountered?: No      Complications:  None    Airway Device:  Oral endotracheal tube    Airway Device Size:  7.5    Style/Cuff Inflation:  Cuffed (inflated to minimal occlusive pressure)    Tube secured:  19    Secured at:  The lips    Placement Verified By:  Capnometry    Complicating Factors:  None    Findings Post-Intubation:  BS equal bilateral and atraumatic/condition of teeth unchanged  Notes:      Pt to OR via stretcher. Pt. transferred to procedural table. All ASA monitors applied. Smooth Induction. Eyelids secured with paper tape prior to intubation. 7.5 ETT placed via DL without issue. No s/s of damage to lips, mouth, teeth or gums noted. No s/s of aspiration noted. Pt. tolerated procedure well. ETT placement verified with ETCO2 waveform visualized, Bilateral Chest Rise and Fog in ETT noted. ETT secured with silk tape after placement confirmed.

## 2025-05-21 LAB
AORTIC ROOT ANNULUS: 2.6 CM
AORTIC SIZE INDEX: 1.5 CM/M2
ASCENDING AORTA: 3.1 CM
AV INDEX (PROSTH): 0.69
AV MEAN GRADIENT: 20 MMHG
AV PEAK GRADIENT: 38 MMHG
AV VALVE AREA BY VELOCITY RATIO: 2.1 CM²
AV VALVE AREA: 2.4 CM²
AV VELOCITY RATIO: 0.61
BSA FOR ECHO PROCEDURE: 2.17 M2
CV ECHO LV RWT: 0.63 CM
DOP CALC AO PEAK VEL: 3.1 M/S
DOP CALC AO VTI: 51.3 CM
DOP CALC LVOT AREA: 3.5 CM2
DOP CALC LVOT DIAMETER: 2.1 CM
DOP CALC LVOT PEAK VEL: 1.9 M/S
DOP CALC LVOT STROKE VOLUME: 123.2 CM3
DOP CALC MV VTI: 25.5 CM
DOP CALC RVOT PEAK VEL: 1.26 M/S
DOP CALC RVOT VTI: 23.6 CM
DOP CALCLVOT PEAK VEL VTI: 35.6 CM
E WAVE DECELERATION TIME: 209 MSEC
E/A RATIO: 0.61
E/E' RATIO: 14 M/S
ECHO LV POSTERIOR WALL: 1.3 CM (ref 0.6–1.1)
EJECTION FRACTION: 60 %
FRACTIONAL SHORTENING: 41.5 % (ref 28–44)
INTERVENTRICULAR SEPTUM: 1.3 CM (ref 0.6–1.1)
IVC DIAMETER: 1.59 CM
IVRT: 54 MSEC
LA MAJOR: 5 CM
LA MINOR: 5.3 CM
LA WIDTH: 3.6 CM
LEFT ATRIUM AREA SYSTOLIC (APICAL 2 CHAMBER): 15.47 CM2
LEFT ATRIUM AREA SYSTOLIC (APICAL 4 CHAMBER): 16.31 CM2
LEFT ATRIUM SIZE: 4 CM
LEFT ATRIUM VOLUME INDEX MOD: 20 ML/M2
LEFT ATRIUM VOLUME INDEX: 30 ML/M2
LEFT ATRIUM VOLUME MOD: 41 ML
LEFT ATRIUM VOLUME: 63 CM3
LEFT INTERNAL DIMENSION IN SYSTOLE: 2.4 CM (ref 2.1–4)
LEFT VENTRICLE DIASTOLIC VOLUME INDEX: 34.29 ML/M2
LEFT VENTRICLE DIASTOLIC VOLUME: 72 ML
LEFT VENTRICLE END SYSTOLIC VOLUME APICAL 2 CHAMBER: 38.91 ML
LEFT VENTRICLE END SYSTOLIC VOLUME APICAL 4 CHAMBER: 43.22 ML
LEFT VENTRICLE MASS INDEX: 92.1 G/M2
LEFT VENTRICLE SYSTOLIC VOLUME INDEX: 9.5 ML/M2
LEFT VENTRICLE SYSTOLIC VOLUME: 20 ML
LEFT VENTRICULAR INTERNAL DIMENSION IN DIASTOLE: 4.1 CM (ref 3.5–6)
LEFT VENTRICULAR MASS: 193.5 G
LV LATERAL E/E' RATIO: 15.2 M/S
LV SEPTAL E/E' RATIO: 12.7 M/S
LVED V (TEICH): 71.92 ML
LVES V (TEICH): 19.96 ML
LVOT MG: 9.03 MMHG
LVOT MV: 1.43 CM/S
MV MEAN GRADIENT: 2 MMHG
MV PEAK A VEL: 1.25 M/S
MV PEAK E VEL: 0.76 M/S
MV PEAK GRADIENT: 5 MMHG
MV STENOSIS PRESSURE HALF TIME: 59.64 MS
MV VALVE AREA BY CONTINUITY EQUATION: 4.83 CM2
MV VALVE AREA P 1/2 METHOD: 3.69 CM2
OHS CV RV/LV RATIO: 0.68 CM
PISA TR MAX VEL: 3.2 M/S
POCT GLUCOSE: 139 MG/DL (ref 70–110)
POCT GLUCOSE: 145 MG/DL (ref 70–110)
POCT GLUCOSE: 166 MG/DL (ref 70–110)
POCT GLUCOSE: 185 MG/DL (ref 70–110)
PV MEAN GRADIENT: 4 MMHG
PV MV: 1.05 M/S
PV PEAK GRADIENT: 11 MMHG
PV PEAK VELOCITY: 1.63 M/S
RA MAJOR: 4.81 CM
RA PRESSURE ESTIMATED: 3 MMHG
RA WIDTH: 2.9 CM
RIGHT VENTRICLE DIASTOLIC BASEL DIMENSION: 2.8 CM
RIGHT VENTRICULAR END-DIASTOLIC DIMENSION: 2.77 CM
RV TB RVSP: 6 MMHG
STJ: 2 CM
TDI LATERAL: 0.05 M/S
TDI SEPTAL: 0.06 M/S
TDI: 0.06 M/S
TR MAX PG: 41 MMHG
TRICUSPID ANNULAR PLANE SYSTOLIC EXCURSION: 3.1 CM
TV REST PULMONARY ARTERY PRESSURE: 44 MMHG
Z-SCORE OF LEFT VENTRICULAR DIMENSION IN END DIASTOLE: -4.65
Z-SCORE OF LEFT VENTRICULAR DIMENSION IN END SYSTOLE: -4.02

## 2025-05-21 PROCEDURE — 11000001 HC ACUTE MED/SURG PRIVATE ROOM

## 2025-05-21 PROCEDURE — 63600175 PHARM REV CODE 636 W HCPCS

## 2025-05-21 PROCEDURE — 25000003 PHARM REV CODE 250: Performed by: STUDENT IN AN ORGANIZED HEALTH CARE EDUCATION/TRAINING PROGRAM

## 2025-05-21 PROCEDURE — 63600175 PHARM REV CODE 636 W HCPCS: Performed by: NURSE PRACTITIONER

## 2025-05-21 PROCEDURE — 25000003 PHARM REV CODE 250

## 2025-05-21 PROCEDURE — 25000003 PHARM REV CODE 250: Performed by: SPECIALIST

## 2025-05-21 PROCEDURE — 94761 N-INVAS EAR/PLS OXIMETRY MLT: CPT

## 2025-05-21 PROCEDURE — 27000221 HC OXYGEN, UP TO 24 HOURS

## 2025-05-21 RX ORDER — MUPIROCIN 20 MG/G
OINTMENT TOPICAL 2 TIMES DAILY
Status: DISCONTINUED | OUTPATIENT
Start: 2025-05-21 | End: 2025-05-22 | Stop reason: HOSPADM

## 2025-05-21 RX ORDER — LISINOPRIL 10 MG/1
10 TABLET ORAL DAILY
Status: DISCONTINUED | OUTPATIENT
Start: 2025-05-21 | End: 2025-05-22

## 2025-05-21 RX ORDER — INSULIN ASPART 100 [IU]/ML
0-10 INJECTION, SOLUTION INTRAVENOUS; SUBCUTANEOUS
Status: DISCONTINUED | OUTPATIENT
Start: 2025-05-21 | End: 2025-05-22 | Stop reason: HOSPADM

## 2025-05-21 RX ADMIN — MUPIROCIN: 20 OINTMENT TOPICAL at 08:05

## 2025-05-21 RX ADMIN — MORPHINE SULFATE 2 MG: 4 INJECTION INTRAVENOUS at 08:05

## 2025-05-21 RX ADMIN — INSULIN ASPART 2 UNITS: 100 INJECTION, SOLUTION INTRAVENOUS; SUBCUTANEOUS at 06:05

## 2025-05-21 RX ADMIN — HEPARIN SODIUM 5000 UNITS: 5000 INJECTION INTRAVENOUS; SUBCUTANEOUS at 06:05

## 2025-05-21 RX ADMIN — GABAPENTIN 100 MG: 100 CAPSULE ORAL at 09:05

## 2025-05-21 RX ADMIN — ASPIRIN 81 MG: 81 TABLET, COATED ORAL at 09:05

## 2025-05-21 RX ADMIN — INSULIN ASPART 1 UNITS: 100 INJECTION, SOLUTION INTRAVENOUS; SUBCUTANEOUS at 09:05

## 2025-05-21 RX ADMIN — GABAPENTIN 100 MG: 100 CAPSULE ORAL at 08:05

## 2025-05-21 RX ADMIN — EZETIMIBE 10 MG: 10 TABLET ORAL at 08:05

## 2025-05-21 RX ADMIN — CLOPIDOGREL 75 MG: 75 TABLET ORAL at 09:05

## 2025-05-21 RX ADMIN — GABAPENTIN 100 MG: 100 CAPSULE ORAL at 03:05

## 2025-05-21 RX ADMIN — MORPHINE SULFATE 2 MG: 4 INJECTION INTRAVENOUS at 09:05

## 2025-05-21 RX ADMIN — MUPIROCIN: 20 OINTMENT TOPICAL at 09:05

## 2025-05-21 RX ADMIN — MORPHINE SULFATE 2 MG: 4 INJECTION INTRAVENOUS at 12:05

## 2025-05-21 RX ADMIN — HEPARIN SODIUM 5000 UNITS: 5000 INJECTION INTRAVENOUS; SUBCUTANEOUS at 02:05

## 2025-05-21 RX ADMIN — HEPARIN SODIUM 5000 UNITS: 5000 INJECTION INTRAVENOUS; SUBCUTANEOUS at 09:05

## 2025-05-21 NOTE — ANESTHESIA POSTPROCEDURE EVALUATION
Anesthesia Post Evaluation    Patient: Javier Barrera    Procedure(s) Performed: Procedure(s) (LRB):  ENDARTERECTOMY, CAROTID (Left)    Final Anesthesia Type: general      Patient location during evaluation: PACU  Patient participation: Yes- Able to Participate  Level of consciousness: awake and alert and oriented  Post-procedure vital signs: reviewed and stable  Pain management: adequate  Airway patency: patent  APOLLO mitigation strategies: Verification of full reversal of neuromuscular block  PONV status at discharge: No PONV  Anesthetic complications: no      Cardiovascular status: hemodynamically stable and hypotensive (requiring low-dose Levophed for BP support)  Respiratory status: unassisted  Hydration status: euvolemic  Follow-up not needed.              Vitals Value Taken Time   /51 05/20/25 17:45   Temp 36.3 °C (97.4 °F) 05/20/25 17:45   Pulse 78 05/20/25 17:45   Resp 21 05/20/25 17:45   SpO2 95 % 05/20/25 17:45   Vitals shown include unfiled device data.      Event Time   Out of Recovery 05/20/2025 17:47:07         Pain/Elda Score: Pain Rating Prior to Med Admin: 7 (5/21/2025  9:23 AM)  Pain Rating Post Med Admin: 0 (5/21/2025  1:19 AM)  Elda Score: 10 (5/20/2025  5:45 PM)

## 2025-05-22 VITALS
TEMPERATURE: 98 F | RESPIRATION RATE: 15 BRPM | BODY MASS INDEX: 34.93 KG/M2 | HEIGHT: 66 IN | DIASTOLIC BLOOD PRESSURE: 58 MMHG | OXYGEN SATURATION: 96 % | HEART RATE: 68 BPM | SYSTOLIC BLOOD PRESSURE: 133 MMHG | WEIGHT: 217.38 LBS

## 2025-05-22 PROBLEM — R09.89 HEMODYNAMIC INSTABILITY: Status: RESOLVED | Noted: 2025-05-20 | Resolved: 2025-05-22

## 2025-05-22 PROBLEM — Z01.810 PREOP CARDIOVASCULAR EXAM: Status: RESOLVED | Noted: 2025-05-19 | Resolved: 2025-05-22

## 2025-05-22 PROBLEM — I65.22 SYMPTOMATIC CAROTID ARTERY STENOSIS, LEFT: Status: RESOLVED | Noted: 2025-05-18 | Resolved: 2025-05-22

## 2025-05-22 PROBLEM — Z86.73 HISTORY OF CVA (CEREBROVASCULAR ACCIDENT): Status: RESOLVED | Noted: 2024-10-17 | Resolved: 2025-05-22

## 2025-05-22 PROBLEM — R57.9 SHOCK: Status: RESOLVED | Noted: 2025-05-20 | Resolved: 2025-05-22

## 2025-05-22 LAB
ABSOLUTE EOSINOPHIL (OHS): 0.04 K/UL
ABSOLUTE MONOCYTE (OHS): 1.3 K/UL (ref 0.3–1)
ABSOLUTE NEUTROPHIL COUNT (OHS): 13.09 K/UL (ref 1.8–7.7)
ALBUMIN SERPL BCP-MCNC: 3.2 G/DL (ref 3.5–5.2)
ALP SERPL-CCNC: 69 UNIT/L (ref 40–150)
ALT SERPL W/O P-5'-P-CCNC: 30 UNIT/L (ref 10–44)
ANION GAP (OHS): 8 MMOL/L (ref 8–16)
AST SERPL-CCNC: 21 UNIT/L (ref 11–45)
BASOPHILS # BLD AUTO: 0.03 K/UL
BASOPHILS NFR BLD AUTO: 0.2 %
BILIRUB SERPL-MCNC: 0.2 MG/DL (ref 0.1–1)
BUN SERPL-MCNC: 38 MG/DL (ref 8–23)
CALCIUM SERPL-MCNC: 9.2 MG/DL (ref 8.7–10.5)
CHLORIDE SERPL-SCNC: 104 MMOL/L (ref 95–110)
CO2 SERPL-SCNC: 27 MMOL/L (ref 23–29)
CREAT SERPL-MCNC: 1.2 MG/DL (ref 0.5–1.4)
ERYTHROCYTE [DISTWIDTH] IN BLOOD BY AUTOMATED COUNT: 13.4 % (ref 11.5–14.5)
GFR SERPLBLD CREATININE-BSD FMLA CKD-EPI: >60 ML/MIN/1.73/M2
GLUCOSE SERPL-MCNC: 102 MG/DL (ref 70–110)
HCT VFR BLD AUTO: 40.2 % (ref 40–54)
HGB BLD-MCNC: 12.8 GM/DL (ref 14–18)
IMM GRANULOCYTES # BLD AUTO: 0.14 K/UL (ref 0–0.04)
IMM GRANULOCYTES NFR BLD AUTO: 0.9 % (ref 0–0.5)
LYMPHOCYTES # BLD AUTO: 1.84 K/UL (ref 1–4.8)
MAGNESIUM SERPL-MCNC: 2.2 MG/DL (ref 1.6–2.6)
MCH RBC QN AUTO: 29.9 PG (ref 27–31)
MCHC RBC AUTO-ENTMCNC: 31.8 G/DL (ref 32–36)
MCV RBC AUTO: 94 FL (ref 82–98)
NUCLEATED RBC (/100WBC) (OHS): 0 /100 WBC
PHOSPHATE SERPL-MCNC: 3.8 MG/DL (ref 2.7–4.5)
PLATELET # BLD AUTO: 222 K/UL (ref 150–450)
PMV BLD AUTO: 11 FL (ref 9.2–12.9)
POCT GLUCOSE: 123 MG/DL (ref 70–110)
POCT GLUCOSE: 155 MG/DL (ref 70–110)
POTASSIUM SERPL-SCNC: 4.9 MMOL/L (ref 3.5–5.1)
PROT SERPL-MCNC: 6.7 GM/DL (ref 6–8.4)
RBC # BLD AUTO: 4.28 M/UL (ref 4.6–6.2)
RELATIVE EOSINOPHIL (OHS): 0.2 %
RELATIVE LYMPHOCYTE (OHS): 11.2 % (ref 18–48)
RELATIVE MONOCYTE (OHS): 7.9 % (ref 4–15)
RELATIVE NEUTROPHIL (OHS): 79.6 % (ref 38–73)
SODIUM SERPL-SCNC: 139 MMOL/L (ref 136–145)
WBC # BLD AUTO: 16.44 K/UL (ref 3.9–12.7)

## 2025-05-22 PROCEDURE — 94760 N-INVAS EAR/PLS OXIMETRY 1: CPT

## 2025-05-22 PROCEDURE — 25000003 PHARM REV CODE 250

## 2025-05-22 PROCEDURE — 97530 THERAPEUTIC ACTIVITIES: CPT

## 2025-05-22 PROCEDURE — 97165 OT EVAL LOW COMPLEX 30 MIN: CPT

## 2025-05-22 PROCEDURE — 85025 COMPLETE CBC W/AUTO DIFF WBC: CPT

## 2025-05-22 PROCEDURE — 25000003 PHARM REV CODE 250: Performed by: STUDENT IN AN ORGANIZED HEALTH CARE EDUCATION/TRAINING PROGRAM

## 2025-05-22 PROCEDURE — 84100 ASSAY OF PHOSPHORUS: CPT

## 2025-05-22 PROCEDURE — 25000003 PHARM REV CODE 250: Performed by: NURSE PRACTITIONER

## 2025-05-22 PROCEDURE — 36415 COLL VENOUS BLD VENIPUNCTURE: CPT

## 2025-05-22 PROCEDURE — 80053 COMPREHEN METABOLIC PANEL: CPT

## 2025-05-22 PROCEDURE — 97161 PT EVAL LOW COMPLEX 20 MIN: CPT

## 2025-05-22 PROCEDURE — 25000003 PHARM REV CODE 250: Performed by: SPECIALIST

## 2025-05-22 PROCEDURE — 99900035 HC TECH TIME PER 15 MIN (STAT)

## 2025-05-22 PROCEDURE — 63600175 PHARM REV CODE 636 W HCPCS: Performed by: NURSE PRACTITIONER

## 2025-05-22 PROCEDURE — 83735 ASSAY OF MAGNESIUM: CPT

## 2025-05-22 RX ORDER — CLOPIDOGREL BISULFATE 75 MG/1
75 TABLET ORAL DAILY
Qty: 30 TABLET | Refills: 11 | Status: SHIPPED | OUTPATIENT
Start: 2025-05-23 | End: 2026-05-23

## 2025-05-22 RX ORDER — HYDROCODONE BITARTRATE AND ACETAMINOPHEN 5; 325 MG/1; MG/1
1 TABLET ORAL EVERY 6 HOURS PRN
Qty: 15 TABLET | Refills: 0 | Status: SHIPPED | OUTPATIENT
Start: 2025-05-22

## 2025-05-22 RX ADMIN — HEPARIN SODIUM 5000 UNITS: 5000 INJECTION INTRAVENOUS; SUBCUTANEOUS at 05:05

## 2025-05-22 RX ADMIN — CLOPIDOGREL 75 MG: 75 TABLET ORAL at 08:05

## 2025-05-22 RX ADMIN — MORPHINE SULFATE 2 MG: 4 INJECTION INTRAVENOUS at 05:05

## 2025-05-22 RX ADMIN — POLYETHYLENE GLYCOL 3350 17 G: 17 POWDER, FOR SOLUTION ORAL at 08:05

## 2025-05-22 RX ADMIN — MUPIROCIN: 20 OINTMENT TOPICAL at 08:05

## 2025-05-22 RX ADMIN — ASPIRIN 81 MG: 81 TABLET, COATED ORAL at 08:05

## 2025-05-22 RX ADMIN — LISINOPRIL 10 MG: 10 TABLET ORAL at 08:05

## 2025-05-22 RX ADMIN — GABAPENTIN 100 MG: 100 CAPSULE ORAL at 08:05

## 2025-05-22 RX ADMIN — MORPHINE SULFATE 2 MG: 4 INJECTION INTRAVENOUS at 11:05

## 2025-05-22 RX ADMIN — INSULIN ASPART 2 UNITS: 100 INJECTION, SOLUTION INTRAVENOUS; SUBCUTANEOUS at 05:05

## 2025-05-23 ENCOUNTER — PATIENT OUTREACH (OUTPATIENT)
Dept: ADMINISTRATIVE | Facility: CLINIC | Age: 70
End: 2025-05-23
Payer: MEDICARE

## 2025-05-23 ENCOUNTER — OFFICE VISIT (OUTPATIENT)
Dept: FAMILY MEDICINE | Facility: CLINIC | Age: 70
End: 2025-05-23
Payer: MEDICARE

## 2025-05-23 VITALS
HEIGHT: 66 IN | OXYGEN SATURATION: 99 % | BODY MASS INDEX: 36 KG/M2 | SYSTOLIC BLOOD PRESSURE: 130 MMHG | RESPIRATION RATE: 14 BRPM | WEIGHT: 224 LBS | HEART RATE: 90 BPM | DIASTOLIC BLOOD PRESSURE: 74 MMHG

## 2025-05-23 DIAGNOSIS — Z12.11 COLON CANCER SCREENING: ICD-10-CM

## 2025-05-23 DIAGNOSIS — I65.23 BILATERAL CAROTID ARTERY STENOSIS: ICD-10-CM

## 2025-05-23 DIAGNOSIS — E78.2 MIXED HYPERLIPIDEMIA: ICD-10-CM

## 2025-05-23 DIAGNOSIS — Z98.890 H/O CAROTID ENDARTERECTOMY: ICD-10-CM

## 2025-05-23 DIAGNOSIS — E55.9 VITAMIN D DEFICIENCY: ICD-10-CM

## 2025-05-23 DIAGNOSIS — E11.9 TYPE 2 DIABETES MELLITUS WITHOUT COMPLICATION, WITHOUT LONG-TERM CURRENT USE OF INSULIN: ICD-10-CM

## 2025-05-23 DIAGNOSIS — F10.939 ALCOHOL WITHDRAWAL SYNDROME WITH COMPLICATION: Primary | ICD-10-CM

## 2025-05-23 DIAGNOSIS — M54.42 CHRONIC BILATERAL LOW BACK PAIN WITH BILATERAL SCIATICA: ICD-10-CM

## 2025-05-23 DIAGNOSIS — E66.01 SEVERE OBESITY (BMI 35.0-39.9) WITH COMORBIDITY: ICD-10-CM

## 2025-05-23 DIAGNOSIS — I10 PRIMARY HYPERTENSION: ICD-10-CM

## 2025-05-23 DIAGNOSIS — M54.41 CHRONIC BILATERAL LOW BACK PAIN WITH BILATERAL SCIATICA: ICD-10-CM

## 2025-05-23 DIAGNOSIS — G89.29 CHRONIC BILATERAL LOW BACK PAIN WITH BILATERAL SCIATICA: ICD-10-CM

## 2025-05-23 PROBLEM — I27.20 PULMONARY HYPERTENSION: Status: RESOLVED | Noted: 2025-05-23 | Resolved: 2025-05-23

## 2025-05-23 PROBLEM — I27.20 PULMONARY HYPERTENSION: Status: ACTIVE | Noted: 2025-05-23

## 2025-05-23 PROBLEM — R73.03 PREDIABETES: Status: RESOLVED | Noted: 2020-12-11 | Resolved: 2025-05-23

## 2025-05-23 PROCEDURE — 99999 PR PBB SHADOW E&M-EST. PATIENT-LVL V: CPT | Mod: PBBFAC,,, | Performed by: STUDENT IN AN ORGANIZED HEALTH CARE EDUCATION/TRAINING PROGRAM

## 2025-05-23 RX ORDER — INSULIN PUMP SYRINGE, 3 ML
EACH MISCELLANEOUS
Qty: 1 EACH | Refills: 3 | Status: SHIPPED | OUTPATIENT
Start: 2025-05-23

## 2025-05-23 RX ORDER — NAPROXEN SODIUM 220 MG/1
81 TABLET, FILM COATED ORAL
COMMUNITY
Start: 2025-05-12 | End: 2025-05-23

## 2025-05-23 RX ORDER — LISINOPRIL 20 MG/1
20 TABLET ORAL DAILY
Qty: 90 TABLET | Refills: 3 | Status: SHIPPED | OUTPATIENT
Start: 2025-05-23

## 2025-05-23 RX ORDER — LISINOPRIL 20 MG/1
20 TABLET ORAL
COMMUNITY
Start: 2025-05-14 | End: 2025-05-23 | Stop reason: SDUPTHER

## 2025-05-23 RX ORDER — CYCLOBENZAPRINE HCL 10 MG
10 TABLET ORAL
COMMUNITY
End: 2025-05-23 | Stop reason: SDUPTHER

## 2025-05-23 RX ORDER — ROSUVASTATIN CALCIUM 20 MG/1
20 TABLET, COATED ORAL NIGHTLY
Qty: 90 TABLET | Refills: 3 | Status: SHIPPED | OUTPATIENT
Start: 2025-05-23

## 2025-05-23 NOTE — PROGRESS NOTES
C3 nurse attempted to contact patient for a TCC post hospital discharge follow-up call. The patient politely declined call at this time.

## 2025-05-23 NOTE — PROGRESS NOTES
1. Alcohol withdrawal syndrome with complication  Overview:  Reports admitted few months ago for hallucination/alcohol withdrawl  Last drink march 2025  Denies symptoms currently        2. Severe obesity (BMI 35.0-39.9) with comorbidity  Overview:  Wt Readings from Last 3 Encounters:   05/23/25 1422 101.6 kg (224 lb)   05/22/25 0546 98.6 kg (217 lb 6 oz)   05/21/25 0945 101.5 kg (223 lb 12.3 oz)   05/18/25 2355 101.5 kg (223 lb 12.3 oz)   05/18/25 0309 103.8 kg (228 lb 13.4 oz)   05/17/25 2014 103.4 kg (228 lb)   10/17/24 1448 104.8 kg (231 lb)         General weight loss/lifestyle modification strategies discussed:   limit sugary drinks, Informal exercise, exercise 3-5x per week             Orders:  -     Lipid Panel; Standing  -     Microalbumin/Creatinine Ratio, Urine; Standing  -     TSH; Standing  -     CBC Without Differential; Standing  -     Comprehensive Metabolic Panel; Standing  -     Hemoglobin A1C; Standing    3. Type 2 diabetes mellitus without complication, without long-term current use of insulin  Overview:  Well controlled   Lab Results   Component Value Date    HGBA1C 6.2 05/17/2025       Hypoglycemic Events: none     -on statin:   Hyperlipidemia Medications              rosuvastatin (CRESTOR) 20 MG tablet Take 20 mg by mouth every evening.          -on ACE-I/ARB:   Hypertension Medications              lisinopriL (PRINIVIL,ZESTRIL) 20 MG tablet TAKE 1 TABLET(20 MG) BY MOUTH DAILY          -counseling provided on importance of diabetic diet and medication compliance in order to treat diabetes  -discussed diabetes disease course and potential complications  Follow up 3 months       Orders:  -     rosuvastatin (CRESTOR) 20 MG tablet; Take 1 tablet (20 mg total) by mouth every evening.  Dispense: 90 tablet; Refill: 3  -     blood-glucose meter kit; Use as instructed  Dispense: 1 each; Refill: 3  -     lisinopriL (PRINIVIL,ZESTRIL) 20 MG tablet; Take 1 tablet (20 mg total) by mouth once daily. TAKE 1  TABLET(20 MG) BY MOUTH DAILY  Dispense: 90 tablet; Refill: 3  -     Lipid Panel; Standing  -     Microalbumin/Creatinine Ratio, Urine; Standing  -     TSH; Standing  -     CBC Without Differential; Standing  -     Comprehensive Metabolic Panel; Standing  -     Hemoglobin A1C; Standing    4. Mixed hyperlipidemia  Overview:  -chronic condition. Currently stable.      Hyperlipidemia Medications              rosuvastatin (CRESTOR) 20 MG tablet Take 20 mg by mouth every evening.              Lab Results   Component Value Date    CHOL 215 (H) 05/17/2025    CHOL 130 10/10/2024    CHOL 165 04/13/2023     Lab Results   Component Value Date    HDL 37 05/17/2025    HDL 33 10/10/2024    HDL 40 04/13/2023     Lab Results   Component Value Date    LDLCALC 130 (H) 05/17/2025    LDLCALC 80 10/10/2024    LDLCALC 106.8 04/13/2023     Lab Results   Component Value Date    TRIG 238 (H) 05/17/2025    TRIG 84 10/10/2024    TRIG 91 04/13/2023     Lab Results   Component Value Date    CHOLHDL 5.8 (H) 05/17/2025    CHOLHDL 3.9 10/10/2024    CHOLHDL 24.2 04/13/2023          The ASCVD Risk score (Marco WARREN, et al., 2019) failed to calculate for the following reasons:    Risk score cannot be calculated because patient has a medical history suggesting prior/existing ASCVD      Orders:  -     lisinopriL (PRINIVIL,ZESTRIL) 20 MG tablet; Take 1 tablet (20 mg total) by mouth once daily. TAKE 1 TABLET(20 MG) BY MOUTH DAILY  Dispense: 90 tablet; Refill: 3  -     Lipid Panel; Standing  -     Microalbumin/Creatinine Ratio, Urine; Standing  -     TSH; Standing  -     CBC Without Differential; Standing  -     Comprehensive Metabolic Panel; Standing  -     Hemoglobin A1C; Standing    5. Vitamin D deficiency  Overview:  - Your Vit D is low. Please take vitamin D supplement. Please ensure you are getting outside for a few minutes of daily sun exposure   Lab Results   Component Value Date    [id number] 15 (L) 10/17/2024         Orders:  -     Vitamin D;  Future; Expected date: 05/23/2025    6. Primary hypertension  Overview:  -at goal today  - Current Hypertension Medications:   Hypertension Medications              lisinopriL (PRINIVIL,ZESTRIL) 20 MG tablet Take 1 tablet (20 mg total) by mouth once daily. TAKE 1 TABLET(20 MG) BY MOUTH DAILY          -continue lifestyle modification with low sodium diet and exercise       Orders:  -     lisinopriL (PRINIVIL,ZESTRIL) 20 MG tablet; Take 1 tablet (20 mg total) by mouth once daily. TAKE 1 TABLET(20 MG) BY MOUTH DAILY  Dispense: 90 tablet; Refill: 3  -     Lipid Panel; Standing  -     Microalbumin/Creatinine Ratio, Urine; Standing  -     TSH; Standing  -     CBC Without Differential; Standing  -     Comprehensive Metabolic Panel; Standing  -     Hemoglobin A1C; Standing    7. H/O carotid endarterectomy  Overview:  L carotid endarterectomy 2025        8. Bilateral carotid artery stenosis  Overview:  S/p L carotid endarterectomy   Follows with vascular   Cont plavix and statin      9. Colon cancer screening  -     Ambulatory referral/consult to Endo Procedure ; Future; Expected date: 05/24/2025    10. Chronic bilateral low back pain with bilateral sciatica  Overview:  -chronic, stable  -remains on Gabapentin and PRN muscle relaxants  - referral pain mgmt for possible JUAN C vs nerve ablation     Orders:  -     Ambulatory referral/consult to Pain Clinic; Future; Expected date: 05/30/2025  -     Lipid Panel; Standing  -     Microalbumin/Creatinine Ratio, Urine; Standing  -     TSH; Standing  -     CBC Without Differential; Standing  -     Comprehensive Metabolic Panel; Standing  -     Hemoglobin A1C; Standing         Assessment & Plan    RIGHT CAROTID ARTERY STENOSIS:  - Monitored right carotid artery stenosis (approximately 60-70%).  - Assessed recent hospitalization and carotid endarterectomy.  - Advised on proper post-operative care including infection prevention measures.  - Continued Plavix as prescribed  post-procedure.  - vascular appt next week    LUMBOSACRAL RADICULOPATHY:  - Monitored excruciating pain radiating from the right buttock down the leg, consistent with lumbosacral radiculopathy. Nml exam/no ttp today  - Continued gabapentin and cyclobenzaprine for neuropathic pain and muscle relaxation, noting current medication regimen is helping with sciatic nerve symptoms.  - Considered pain management options for back issues, avoiding opioid medications.  - Discussed potential benefits of nerve ablation for chronic back pain.      FOLLOW-UP AND ADDITIONAL TESTS:  - Ordered CBC in 6 weeks to reassess WBC count.  - cscope ordered  - Follow up in 6 months.            Sandy Sanches MD  _________________________________________________________________________    Patient ID: Javier Barrera is a 69 y.o. male.      History of Present Illness    CHIEF COMPLAINT:  Patient presents today for follow up after recent carotid surgery.    VASCULAR HISTORY:  He has history of left carotid endarterectomy for >90% stenosis. Right carotid artery has 60-70% stenosis which is being monitored. He experienced a stroke approximately 3 years ago, which was initially undiagnosed for 5 months. Initial symptoms included right arm weakness, prompting immediate medical attention.    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 He reports seeing partially out of the top half of the left eye and bottom half of the right eye. His vision impairment has affected his ability to work. Due for eye exam. He does not drive.     CURRENT SYMPTOMS:  He reports radiating nerve pain from right buttock down leg for past month and a half with excruciating pain during ambulation. No pain today.    SOCIAL HISTORY:  He currently resides at a [facility] in [place], [place], after being evicted from his travel trailer due to [place] ordinance restrictions. He quit smoking approximately 15  years ago.           Past medical histories reviewed, including past medical, surgical, family and social histories.      Medications Ordered Prior to Encounter[1]    Review of Systems   12 point review of systems negative except for listed in HPI.     Objective:    Nursing note and vitals reviewed.  Vitals:    05/23/25 1422   BP: 130/74   Pulse: 90   Resp: 14     Body mass index is 36.15 kg/m².     Physical Exam   Constitutional: oriented to person, place, and time. well-developed and well-nourished. No distress.   HENT: WNL  Head: Normocephalic and atraumatic.   Eyes: EOM are normal.   Neck: Normal range of motion. Neck supple. L carotid endarterectomy well healing incision. C/d/i  Cardiovascular: Normal rate  Pulmonary/Chest: Effort normal. No respiratory distress.   GI: soft, non distended, no ttp, no rebound/guarding  Musculoskeletal: Normal range of motion. no edema. No paraspinal ttp, no step- offs, no point ttp, neg straight leg   Neurological: CN II-XII intact  Skin: warm and dry.   Psychiatric: normal mood and affect. behavior is normal.           We Offer Telehealth & Same Day Appointments!   Book your Telehealth appointment with me through my nurse or   Clinic appointments on [employer]!  [place]-[phone]     To Schedule appointments online, go to [employer]: [email or web address]     Transitional Care Note    Family and/or Caretaker present at visit?  No.  Diagnostic tests reviewed/disposition: No diagnosic tests pending after this hospitalization.  Disease/illness education: carotid artery disease   Home health/community services discussion/referrals: Patient does not have home health established from hospital visit.  They do not need home health.  If needed, we will set up home health for the patient.   Establishment or re-establishment of referral orders for community resources: No other necessary community resources.   Discussion with other health care providers: No discussion  with other health care providers necessary.     This note was generated with the assistance of ambient listening technology. Verbal consent was obtained by the patient and accompanying visitor(s) for the recording of patient appointment to facilitate this note. I attest to having reviewed and edited the generated note for accuracy, though some syntax or spelling errors may persist. Please contact the author of this note for any clarification.             [1]   Current Outpatient Medications on File Prior to Visit   Medication Sig Dispense Refill    clopidogreL (PLAVIX) 75 mg tablet Take 1 tablet (75 mg total) by mouth once daily. 30 tablet 11    cyclobenzaprine (FLEXERIL) 10 MG tablet Take 1 tablet (10 mg total) by mouth 3 (three) times daily. 60 tablet 3    ergocalciferol (ERGOCALCIFEROL) 50,000 unit Cap Take 1 capsule (50,000 Units total) by mouth every 7 days. 12 capsule 3    gabapentin (NEURONTIN) 600 MG tablet Take 1 tablet (600 mg total) by mouth 3 (three) times daily. 90 tablet 11    HYDROcodone-acetaminophen (NORCO) 5-325 mg per tablet Take 1 tablet by mouth every 6 (six) hours as needed for Pain. 15 tablet 0    multivitamin (THERAGRAN) per tablet Take 1 tablet by mouth once daily.      potassium chloride SA (K-DUR,KLOR-CON) 20 MEQ tablet Take 20 mEq by mouth once daily.      thiamine 100 MG tablet Take 100 mg by mouth once daily.      [DISCONTINUED] aspirin 81 MG Chew Take 81 mg by mouth.      [DISCONTINUED] cyclobenzaprine (FLEXERIL) 10 MG tablet Take 10 mg by mouth.      [DISCONTINUED] lisinopriL (PRINIVIL,ZESTRIL) 20 MG tablet TAKE 1 TABLET(20 MG) BY MOUTH DAILY 90 tablet 0    [DISCONTINUED] lisinopriL (PRINIVIL,ZESTRIL) 20 MG tablet Take 20 mg by mouth.      [DISCONTINUED] metFORMIN (GLUCOPHAGE) 500 MG tablet TAKE 1 TABLET(500 MG) BY MOUTH TWICE DAILY 180 tablet 0    [DISCONTINUED] rosuvastatin (CRESTOR) 20 MG tablet Take 20 mg by mouth every evening.       Current Facility-Administered Medications on  File Prior to Visit   Medication Dose Route Frequency Provider Last Rate Last Admin    [DISCONTINUED] acetaminophen suppository 650 mg  650 mg Rectal Q6H PRN Stanislav Malik NP        [DISCONTINUED] acetaminophen tablet 650 mg  650 mg Oral Q6H PRN Stanislav Malik NP        [DISCONTINUED] aluminum-magnesium hydroxide-simethicone 200-200-20 mg/5 mL suspension 30 mL  30 mL Oral QID PRN Stanislav Malik NP   30 mL at 05/18/25 0055    [DISCONTINUED] aspirin EC tablet 81 mg  81 mg Oral Daily Luis Cramer MD   81 mg at 05/22/25 0813    [DISCONTINUED] clopidogreL tablet 75 mg  75 mg Oral Daily Luis Cramer MD   75 mg at 05/22/25 0813    [DISCONTINUED] dextrose 50% injection 12.5 g  12.5 g Intravenous PRN Stanislav Malik NP        [DISCONTINUED] dextrose 50% injection 25 g  25 g Intravenous PRN Stanislav Malik NP        [DISCONTINUED] ezetimibe tablet 10 mg  10 mg Oral QHS Brittani Snyder NP   10 mg at 05/21/25 2009    [DISCONTINUED] gabapentin capsule 100 mg  100 mg Oral TID Brittani Snyder NP   100 mg at 05/22/25 0813    [DISCONTINUED] glucagon (human recombinant) injection 1 mg  1 mg Intramuscular PRN Stanislav Malik NP        [DISCONTINUED] glucose chewable tablet 16 g  16 g Oral PRN Stanislav Malik NP        [DISCONTINUED] glucose chewable tablet 24 g  24 g Oral PRN Stanislav Malik NP        [DISCONTINUED] heparin (porcine) injection 5,000 Units  5,000 Units Subcutaneous Q8H Stanislav Malik NP   5,000 Units at 05/22/25 0540    [DISCONTINUED] HYDROcodone-acetaminophen 5-325 mg per tablet 1 tablet  1 tablet Oral Q6H PRN Stanislav Malik NP   1 tablet at 05/20/25 1831    [DISCONTINUED] insulin aspart U-100 pen 0-10 Units  0-10 Units Subcutaneous QID (AC + HS) PRN Jose Perales PA-C   2 Units at 05/22/25 0545    [DISCONTINUED] melatonin tablet 6 mg  6 mg Oral Nightly PRN Alombro, Stanislav J, NP        [DISCONTINUED] morphine injection 2 mg  2 mg Intravenous Q4H PRN  Stanislav Malik NP   2 mg at 05/22/25 1145    [DISCONTINUED] mupirocin 2 % ointment   Nasal BID Rahul Samuels MD   Given at 05/22/25 0813    [DISCONTINUED] naloxone 0.4 mg/mL injection 0.02 mg  0.02 mg Intravenous PRN Stanislav Malik NP        [DISCONTINUED] ondansetron injection 4 mg  4 mg Intravenous Q8H PRN Stanislav Malik NP        [DISCONTINUED] polyethylene glycol packet 17 g  17 g Oral Daily PRN Stanislav Malik NP   17 g at 05/22/25 0813    [DISCONTINUED] promethazine tablet 25 mg  25 mg Oral Q6H PRN Stanislav Malik NP        [DISCONTINUED] simethicone chewable tablet 80 mg  1 tablet Oral QID PRN Stanislav Malik NP        [DISCONTINUED] sodium chloride 0.9% flush 3 mL  3 mL Intravenous Q12H PRN Stanislav Malik NP

## 2025-05-30 ENCOUNTER — CLINICAL SUPPORT (OUTPATIENT)
Dept: CARDIOLOGY | Facility: HOSPITAL | Age: 70
End: 2025-05-30
Attending: INTERNAL MEDICINE
Payer: MEDICARE

## 2025-05-30 ENCOUNTER — CLINICAL SUPPORT (OUTPATIENT)
Dept: CARDIOLOGY | Facility: HOSPITAL | Age: 70
End: 2025-05-30
Payer: MEDICARE

## 2025-05-30 DIAGNOSIS — I49.8 OTHER SPECIFIED CARDIAC ARRHYTHMIAS: ICD-10-CM

## 2025-05-30 PROCEDURE — 93298 REM INTERROG DEV EVAL SCRMS: CPT | Performed by: INTERNAL MEDICINE

## 2025-05-30 PROCEDURE — 93298 REM INTERROG DEV EVAL SCRMS: CPT | Mod: 26,,, | Performed by: INTERNAL MEDICINE

## 2025-06-06 ENCOUNTER — HOSPITAL ENCOUNTER (EMERGENCY)
Facility: HOSPITAL | Age: 70
Discharge: HOME | End: 2025-06-06
Attending: EMERGENCY MEDICINE
Payer: MEDICARE

## 2025-06-06 VITALS
OXYGEN SATURATION: 100 % | SYSTOLIC BLOOD PRESSURE: 117 MMHG | BODY MASS INDEX: 35.96 KG/M2 | WEIGHT: 222.81 LBS | DIASTOLIC BLOOD PRESSURE: 70 MMHG | TEMPERATURE: 98 F | HEART RATE: 81 BPM | RESPIRATION RATE: 15 BRPM

## 2025-06-06 DIAGNOSIS — Z13.6 SCREENING FOR CARDIOVASCULAR CONDITION: ICD-10-CM

## 2025-06-06 DIAGNOSIS — J40 BRONCHITIS: Primary | ICD-10-CM

## 2025-06-06 LAB
ABSOLUTE EOSINOPHIL (OHS): 0.56 K/UL
ABSOLUTE MONOCYTE (OHS): 1.09 K/UL (ref 0.3–1)
ABSOLUTE NEUTROPHIL COUNT (OHS): 9.73 K/UL (ref 1.8–7.7)
ALBUMIN SERPL BCP-MCNC: 3.9 G/DL (ref 3.5–5.2)
ALP SERPL-CCNC: 82 UNIT/L (ref 40–150)
ALT SERPL W/O P-5'-P-CCNC: 19 UNIT/L (ref 10–44)
ANION GAP (OHS): 8 MMOL/L (ref 8–16)
AST SERPL-CCNC: 18 UNIT/L (ref 11–45)
BASOPHILS # BLD AUTO: 0.04 K/UL
BASOPHILS NFR BLD AUTO: 0.3 %
BILIRUB SERPL-MCNC: 0.3 MG/DL (ref 0.1–1)
BILIRUB UR QL STRIP.AUTO: NEGATIVE
BNP SERPL-MCNC: <10 PG/ML (ref 0–99)
BUN SERPL-MCNC: 23 MG/DL (ref 8–23)
CALCIUM SERPL-MCNC: 10.1 MG/DL (ref 8.7–10.5)
CHLORIDE SERPL-SCNC: 103 MMOL/L (ref 95–110)
CLARITY UR: CLEAR
CO2 SERPL-SCNC: 30 MMOL/L (ref 23–29)
COLOR UR AUTO: COLORLESS
CREAT SERPL-MCNC: 1.2 MG/DL (ref 0.5–1.4)
ERYTHROCYTE [DISTWIDTH] IN BLOOD BY AUTOMATED COUNT: 13 % (ref 11.5–14.5)
GFR SERPLBLD CREATININE-BSD FMLA CKD-EPI: >60 ML/MIN/1.73/M2
GLUCOSE SERPL-MCNC: 109 MG/DL (ref 70–110)
GLUCOSE UR QL STRIP: NEGATIVE
HCT VFR BLD AUTO: 43.1 % (ref 40–54)
HGB BLD-MCNC: 14 GM/DL (ref 14–18)
HGB UR QL STRIP: NEGATIVE
HOLD SPECIMEN: NORMAL
IMM GRANULOCYTES # BLD AUTO: 0.07 K/UL (ref 0–0.04)
IMM GRANULOCYTES NFR BLD AUTO: 0.5 % (ref 0–0.5)
INFLUENZA A MOLECULAR (OHS): NEGATIVE
INFLUENZA B MOLECULAR (OHS): NEGATIVE
KETONES UR QL STRIP: NEGATIVE
LACTATE SERPL-SCNC: 0.8 MMOL/L (ref 0.5–2.2)
LEUKOCYTE ESTERASE UR QL STRIP: NEGATIVE
LYMPHOCYTES # BLD AUTO: 2.15 K/UL (ref 1–4.8)
MCH RBC QN AUTO: 30.1 PG (ref 27–31)
MCHC RBC AUTO-ENTMCNC: 32.5 G/DL (ref 32–36)
MCV RBC AUTO: 93 FL (ref 82–98)
NITRITE UR QL STRIP: NEGATIVE
NUCLEATED RBC (/100WBC) (OHS): 0 /100 WBC
OHS QRS DURATION: 68 MS
OHS QTC CALCULATION: 380 MS
PH UR STRIP: 6 [PH]
PLATELET # BLD AUTO: 276 K/UL (ref 150–450)
PMV BLD AUTO: 10.5 FL (ref 9.2–12.9)
POTASSIUM SERPL-SCNC: 4.8 MMOL/L (ref 3.5–5.1)
PROCALCITONIN SERPL-MCNC: 0.03 NG/ML
PROT SERPL-MCNC: 8.2 GM/DL (ref 6–8.4)
PROT UR QL STRIP: NEGATIVE
RBC # BLD AUTO: 4.65 M/UL (ref 4.6–6.2)
RELATIVE EOSINOPHIL (OHS): 4.1 %
RELATIVE LYMPHOCYTE (OHS): 15.8 % (ref 18–48)
RELATIVE MONOCYTE (OHS): 8 % (ref 4–15)
RELATIVE NEUTROPHIL (OHS): 71.3 % (ref 38–73)
SARS-COV-2 RDRP RESP QL NAA+PROBE: NEGATIVE
SODIUM SERPL-SCNC: 141 MMOL/L (ref 136–145)
SP GR UR STRIP: 1.01
TROPONIN I SERPL DL<=0.01 NG/ML-MCNC: <0.006 NG/ML
UROBILINOGEN UR STRIP-ACNC: NEGATIVE EU/DL
WBC # BLD AUTO: 13.64 K/UL (ref 3.9–12.7)

## 2025-06-06 PROCEDURE — 84484 ASSAY OF TROPONIN QUANT: CPT | Performed by: NURSE PRACTITIONER

## 2025-06-06 PROCEDURE — 87040 BLOOD CULTURE FOR BACTERIA: CPT | Performed by: NURSE PRACTITIONER

## 2025-06-06 PROCEDURE — 83605 ASSAY OF LACTIC ACID: CPT | Performed by: NURSE PRACTITIONER

## 2025-06-06 PROCEDURE — 81003 URINALYSIS AUTO W/O SCOPE: CPT | Performed by: NURSE PRACTITIONER

## 2025-06-06 PROCEDURE — 84145 PROCALCITONIN (PCT): CPT | Performed by: NURSE PRACTITIONER

## 2025-06-06 PROCEDURE — 87502 INFLUENZA DNA AMP PROBE: CPT | Performed by: EMERGENCY MEDICINE

## 2025-06-06 PROCEDURE — U0002 COVID-19 LAB TEST NON-CDC: HCPCS | Performed by: EMERGENCY MEDICINE

## 2025-06-06 PROCEDURE — 94640 AIRWAY INHALATION TREATMENT: CPT

## 2025-06-06 PROCEDURE — 85025 COMPLETE CBC W/AUTO DIFF WBC: CPT | Performed by: NURSE PRACTITIONER

## 2025-06-06 PROCEDURE — 99285 EMERGENCY DEPT VISIT HI MDM: CPT | Mod: 25

## 2025-06-06 PROCEDURE — 93005 ELECTROCARDIOGRAM TRACING: CPT

## 2025-06-06 PROCEDURE — 83880 ASSAY OF NATRIURETIC PEPTIDE: CPT | Performed by: NURSE PRACTITIONER

## 2025-06-06 PROCEDURE — 94761 N-INVAS EAR/PLS OXIMETRY MLT: CPT

## 2025-06-06 PROCEDURE — 25000242 PHARM REV CODE 250 ALT 637 W/ HCPCS: Performed by: EMERGENCY MEDICINE

## 2025-06-06 PROCEDURE — 93010 ELECTROCARDIOGRAM REPORT: CPT | Mod: ,,, | Performed by: INTERNAL MEDICINE

## 2025-06-06 PROCEDURE — 82040 ASSAY OF SERUM ALBUMIN: CPT | Performed by: NURSE PRACTITIONER

## 2025-06-06 PROCEDURE — 25000003 PHARM REV CODE 250: Performed by: EMERGENCY MEDICINE

## 2025-06-06 RX ORDER — IBUPROFEN 400 MG/1
400 TABLET, FILM COATED ORAL
Status: COMPLETED | OUTPATIENT
Start: 2025-06-06 | End: 2025-06-06

## 2025-06-06 RX ORDER — ALBUTEROL SULFATE 90 UG/1
1-2 INHALANT RESPIRATORY (INHALATION) EVERY 6 HOURS PRN
Qty: 6.7 G | Refills: 0 | Status: SHIPPED | OUTPATIENT
Start: 2025-06-06 | End: 2026-06-06

## 2025-06-06 RX ORDER — IPRATROPIUM BROMIDE AND ALBUTEROL SULFATE 2.5; .5 MG/3ML; MG/3ML
3 SOLUTION RESPIRATORY (INHALATION)
Status: COMPLETED | OUTPATIENT
Start: 2025-06-06 | End: 2025-06-06

## 2025-06-06 RX ADMIN — IPRATROPIUM BROMIDE AND ALBUTEROL SULFATE 3 ML: 2.5; .5 SOLUTION RESPIRATORY (INHALATION) at 04:06

## 2025-06-06 RX ADMIN — IBUPROFEN 400 MG: 400 TABLET ORAL at 06:06

## 2025-06-06 NOTE — FIRST PROVIDER EVALUATION
Medical screening examination initiated.  I have conducted a focused provider triage encounter, findings are as follows:    Brief history of present illness:  Patient reports recent carotid surgery.  Patient reports fever along with cough and shortness of breath    Vitals:    06/06/25 1350   BP: (!) 143/65   BP Location: Right arm   Pulse: 82   Resp: 18   Temp: 98.4 °F (36.9 °C)   TempSrc: Oral   SpO2: 95%   Weight: 101.1 kg (222 lb 12.8 oz)       Pertinent physical exam:  No acute distress    Brief workup plan:  Labs, EKG, imaging, further eval    Preliminary workup initiated; this workup will be continued and followed by the physician or advanced practice provider that is assigned to the patient when roomed.

## 2025-06-06 NOTE — ED PROVIDER NOTES
SCRIBE #1 NOTE: I, Almita Gannon, am scribing for, and in the presence of, Jessica Zurita MD. I have scribed the entire note.       History     Chief Complaint   Patient presents with    Shortness of Breath     Pt c/o cough and SOB x 4 days.  He was recently hospitalized here for several days for a vascular surgery to his neck.     Review of patient's allergies indicates:   Allergen Reactions    Penicillins Swelling    Atorvastatin Other (See Comments) and Hives     Muscle Spasms         History of Present Illness     HPI    6/6/2025, 4:26 PM  History obtained from the patient and medical records      History of Present Illness: Javier Barrera is a 69 y.o. male patient with a PMHx of T2DM, HLD, HTN, right sided sciatica, and a CVA who presents to the Emergency Department for evaluation of SOB which began five days ago. Associated sxs inlcude a productive cough (white sputum), subjective fever, and wheezing upon exertion. He denies any known history of COPD or emphysema. He was a previous smoker. Pt was recently hospitalized here for several days following a carotid endarterectomy. No mitigating or exacerbating factors reported. Patient denies any leg swelling, CP, or N/V. No prior Tx specified.  No further complaints or concerns at this time.       Arrival mode: Personal Transportation    PCP: Sandy Sanches MD        Past Medical History:  Past Medical History:   Diagnosis Date    Diabetes mellitus, type 2     Hyperlipidemia     Hypertension     Right sided sciatica     Stroke        Past Surgical History:  Past Surgical History:   Procedure Laterality Date    CAROTID ENDARTERECTOMY Left 5/20/2025    Procedure: ENDARTERECTOMY, CAROTID;  Surgeon: Luis Cramer MD;  Location: [facility];  Service: Peripheral Vascular;  Laterality: Left;    HEMORRHOID SURGERY      INSERTION OF IMPLANTABLE LOOP RECORDER Left 2/2/2022    Procedure: INSERTION, IMPLANTABLE LOOP RECORDER;  Surgeon: Jairon Barahona MD;   Location: [facility] CATH LAB;  Service: Cardiology;  Laterality: Left;  scheduled for lukasz and loop implant in cvru (MDT)         Family History:  No family history on file.    Social History:  Social History     Tobacco Use    Smoking status: Former     Current packs/day: 0.00     Types: Cigarettes     Quit date: 2001     Years since quittin.4    Smokeless tobacco: Never   Vaping Use    Vaping status: Not on file   Substance and Sexual Activity    Alcohol use: Not Currently    Drug use: Never    Sexual activity: Not Currently        Review of Systems     Review of Systems   Constitutional:  Positive for fever (subjective).   HENT:  Negative for sore throat.    Respiratory:  Positive for cough (productive), shortness of breath and wheezing.    Cardiovascular:  Negative for chest pain and leg swelling.   Gastrointestinal:  Negative for nausea and vomiting.   Genitourinary:  Negative for dysuria.   Musculoskeletal:  Negative for back pain.   Skin:  Negative for rash.   Neurological:  Negative for weakness.   Hematological:  Does not bruise/bleed easily.   All other systems reviewed and are negative.     Physical Exam     Initial Vitals [25 1350]   BP Pulse Resp Temp SpO2   (!) 143/65 82 18 98.4 °F (36.9 °C) 95 %      MAP       --          Physical Exam  Nursing Notes and Vital Signs Reviewed.  Constitutional: Patient is in no acute distress. Well-developed and well-nourished.  Head: Atraumatic. Normocephalic.  Eyes: PERRL. EOM intact. Conjunctivae are not pale. No scleral icterus.  ENT: Mucous membranes are moist. Oropharynx is clear and symmetric.    Neck: Supple. Full ROM. No lymphadenopathy.  Cardiovascular: Regular rate. Regular rhythm. No murmurs, rubs, or gallops. Distal pulses are 2+ and symmetric.  Pulmonary/Chest: No respiratory distress. Lungs diminished. No wheezing, crackles, or rhonchi.   Abdominal: Soft and non-distended. There is no tenderness.  No rebound, guarding, or rigidity. Good bowel  sounds.  Genitourinary: No CVA tenderness.  Musculoskeletal: Moves all extremities. No obvious deformities. No edema. No calf tenderness.  Skin: Well healed left anterolateral surgical incision cite. Wound edges well healed. Well approximated. No concerns for overlying infection. No soft tissue swelling. Warm and dry.  Neurological:  Alert, awake, and appropriate.  Normal speech.  No acute focal neurological deficits are appreciated.  Psychiatric: Normal affect. Good eye contact. Appropriate in content.     ED Course   Procedures  ED Vital Signs:  Vitals:    06/06/25 1350 06/06/25 1615 06/06/25 1632 06/06/25 1646   BP: (!) 143/65 134/76 (!) 154/69 117/70   Pulse: 82 80 78 75   Resp: 18 16 20 15   Temp: 98.4 °F (36.9 °C)      TempSrc: Oral      SpO2: 95% 99% 100% 100%   Weight: 101.1 kg (222 lb 12.8 oz)       06/06/25 1750   BP:    Pulse: 81   Resp:    Temp:    TempSrc:    SpO2:    Weight:        Abnormal Lab Results:  Labs Reviewed   COMPREHENSIVE METABOLIC PANEL - Abnormal       Result Value    Sodium 141      Potassium 4.8      Chloride 103      CO2 30 (*)     Glucose 109      BUN 23      Creatinine 1.2      Calcium 10.1      Protein Total 8.2      Albumin 3.9      Bilirubin Total 0.3      ALP 82      AST 18      ALT 19      Anion Gap 8      eGFR >60     URINALYSIS, REFLEX TO URINE CULTURE - Abnormal    Color, UA Colorless (*)     Appearance, UA Clear      pH, UA 6.0      Spec Grav UA 1.010      Protein, UA Negative      Glucose, UA Negative      Ketones, UA Negative      Bilirubin, UA Negative      Blood, UA Negative      Nitrites, UA Negative      Urobilinogen, UA Negative      Leukocyte Esterase, UA Negative     CBC WITH DIFFERENTIAL - Abnormal    WBC 13.64 (*)     RBC 4.65      HGB 14.0      HCT 43.1      MCV 93      MCH 30.1      MCHC 32.5      RDW 13.0      Platelet Count 276      MPV 10.5      Nucleated RBC 0      Neut % 71.3      Lymph % 15.8 (*)     Mono % 8.0      Eos % 4.1      Basophil % 0.3      Imm  Grans % 0.5      Neut # 9.73 (*)     Lymph # 2.15      Mono # 1.09 (*)     Eos # 0.56 (*)     Baso # 0.04      Imm Grans # 0.07 (*)    INFLUENZA A & B BY MOLECULAR - Normal    INFLUENZA A MOLECULAR Negative      INFLUENZA B MOLECULAR  Negative     LACTIC ACID, PLASMA - Normal    Lactic Acid Level 0.8      Narrative:     Falsely low lactic acid results can be found in samples containing >=13.0 mg/dL total bilirubin and/or >=3.5 mg/dL direct bilirubin.    TROPONIN I - Normal    Troponin-I <0.006     B-TYPE NATRIURETIC PEPTIDE - Normal    BNP <10     PROCALCITONIN - Normal    Procalcitonin 0.03     SARS-COV-2 RNA AMPLIFICATION, QUAL - Normal    SARS COV-2 Molecular Negative     CULTURE, BLOOD   CULTURE, BLOOD   CBC W/ AUTO DIFFERENTIAL    Narrative:     The following orders were created for panel order CBC auto differential.  Procedure                               Abnormality         Status                     ---------                               -----------         ------                     CBC with Differential[[id number]]       Abnormal            Final result                 Please view results for these tests on the individual orders.   GREY TOP URINE HOLD    Extra Tube Hold for add-ons.          All Lab Results:  Results for orders placed or performed during the hospital encounter of 06/06/25   EKG 12-lead    Collection Time: 06/06/25  1:54 PM   Result Value Ref Range    QRS Duration 68 ms    [facility] QTC Calculation 380 ms   Comprehensive metabolic panel    Collection Time: 06/06/25  3:04 PM   Result Value Ref Range    Sodium 141 136 - 145 mmol/L    Potassium 4.8 3.5 - 5.1 mmol/L    Chloride 103 95 - 110 mmol/L    CO2 30 (H) 23 - 29 mmol/L    Glucose 109 70 - 110 mg/dL    BUN 23 8 - 23 mg/dL    Creatinine 1.2 0.5 - 1.4 mg/dL    Calcium 10.1 8.7 - 10.5 mg/dL    Protein Total 8.2 6.0 - 8.4 gm/dL    Albumin 3.9 3.5 - 5.2 g/dL    Bilirubin Total 0.3 0.1 - 1.0 mg/dL    ALP 82 40 - 150 unit/L    AST 18 11 - 45 unit/L     ALT 19 10 - 44 unit/L    Anion Gap 8 8 - 16 mmol/L    eGFR >60 >60 mL/min/1.73/m2   Lactic acid, plasma #1    Collection Time: 06/06/25  3:04 PM   Result Value Ref Range    Lactic Acid Level 0.8 0.5 - 2.2 mmol/L   Troponin I    Collection Time: 06/06/25  3:04 PM   Result Value Ref Range    Troponin-I <0.006 <=0.026 ng/mL   Brain natriuretic peptide    Collection Time: 06/06/25  3:04 PM   Result Value Ref Range    BNP <10 0 - 99 pg/mL   Procalcitonin    Collection Time: 06/06/25  3:04 PM   Result Value Ref Range    Procalcitonin 0.03 <0.25 ng/mL   CBC with Differential    Collection Time: 06/06/25  3:04 PM   Result Value Ref Range    WBC 13.64 (H) 3.90 - 12.70 K/uL    RBC 4.65 4.60 - 6.20 M/uL    HGB 14.0 14.0 - 18.0 gm/dL    HCT 43.1 40.0 - 54.0 %    MCV 93 82 - 98 fL    MCH 30.1 27.0 - 31.0 pg    MCHC 32.5 32.0 - 36.0 g/dL    RDW 13.0 11.5 - 14.5 %    Platelet Count 276 150 - 450 K/uL    MPV 10.5 9.2 - 12.9 fL    Nucleated RBC 0 <=0 /100 WBC    Neut % 71.3 38 - 73 %    Lymph % 15.8 (L) 18 - 48 %    Mono % 8.0 4 - 15 %    Eos % 4.1 <=8 %    Basophil % 0.3 <=1.9 %    Imm Grans % 0.5 0.0 - 0.5 %    Neut # 9.73 (H) 1.8 - 7.7 K/uL    Lymph # 2.15 1 - 4.8 K/uL    Mono # 1.09 (H) 0.3 - 1 K/uL    Eos # 0.56 (H) <=0.5 K/uL    Baso # 0.04 <=0.2 K/uL    Imm Grans # 0.07 (H) 0.00 - 0.04 K/uL   Urinalysis, Reflex to Urine Culture Urine, Clean Catch    Collection Time: 06/06/25  3:55 PM    Specimen: Urine   Result Value Ref Range    Color, UA Colorless (A) Straw, Tawana, Yellow, Light-Orange    Appearance, UA Clear Clear    pH, UA 6.0 5.0 - 8.0    Spec Grav UA 1.010 1.005 - 1.030    Protein, UA Negative Negative    Glucose, UA Negative Negative    Ketones, UA Negative Negative    Bilirubin, UA Negative Negative    Blood, UA Negative Negative    Nitrites, UA Negative Negative    Urobilinogen, UA Negative <2.0 EU/dL    Leukocyte Esterase, UA Negative Negative   GREY TOP URINE HOLD    Collection Time: 06/06/25  3:55 PM   Result  Value Ref Range    Extra Tube Hold for add-ons.    Influenza A & B by Molecular    Collection Time: 06/06/25  4:30 PM    Specimen: Nasal Swab   Result Value Ref Range    INFLUENZA A MOLECULAR Negative Negative    INFLUENZA B MOLECULAR  Negative Negative   COVID-19 Rapid Screening    Collection Time: 06/06/25  4:30 PM   Result Value Ref Range    SARS COV-2 Molecular Negative Negative       Imaging Results:  Imaging Results              CT Chest Without Contrast (Final result)  Result time 06/06/25 17:41:26      Final result by Francisco Sosa DO (06/06/25 17:41:26)                   Impression:     No acute findings.    All CT scans at [this location] are performed using dose modulation techniques as appropriate to a performed exam including the following: automated exposure control; adjustment of the mA and/or kV according to patient size (this includes techniques or standardized protocols for targeted exams where dose is matched to indication / reason for exam; i.e. extremities or head); use of iterative reconstruction technique.    Finalized on: 6/6/2025 5:41 PM By:  Francisco Sosa  [facility]# [id number]      2025-06-06 17:43:36.304     [facility]               Narrative:    Exam: CT CHEST WITHOUT CONTRAST    Comparison: None    Clinical Indication:Persistent cough    Technique:  CT images from the lung apices to the adrenals performed without IV contrast. Multiplanar reformats were constructed.     Findings:    There is no mediastinal, hilar or axillary lymphadenopathy.  No pericardial effusion or significant coronary artery calcification.    No pulmonary nodules, pleural effusions or findings to suggest a pneumonia or underlying interstitial lung disease.  Linear bands of atelectasis at both cardiophrenic angles and lower lobes.    Splenic granulomas.  Within limits of a noncontrast CT, the remaining visualized portions of the upper abdomen are unremarkable.  No concerning lytic or sclerotic bony lesions.                                          X-Ray Chest AP Portable (Final result)  Result time 06/06/25 15:30:43      Final result by Quique Jimenez MD (06/06/25 15:30:43)                   Impression:      No acute findings.      Electronically signed by: Quique Jimenez MD  Date:    06/06/2025  Time:    15:30               Narrative:    EXAMINATION:  XR CHEST AP PORTABLE    CLINICAL HISTORY:  Respiratory distress., Sepsis;    COMPARISON:  08/27/2023 x-ray    FINDINGS:  A loop recorder overlies the left chest.  The heart size is normal.  The lung fields remain clear.                                       The EKG was ordered, reviewed, and independently interpreted by the ED provider.  Interpretation time: 13:54  Rate: 82 BPM  Rhythm: Sinus rhythm with premature supraventricular complexes  Interpretation: ST and T wave abnormality, consider inferolateral ischemia. Abnormal EGG. No STEMI.           The Emergency Provider reviewed the vital signs and test results, which are outlined above.     ED Discussion     6:10 PM: Reassessed pt at this time. Discussed with patient and/or family/caretaker all pertinent ED information and results. Discussed pt dx and plan of tx. Gave the patient all f/u and return to the ED instructions. All questions and concerns were addressed at this time. Patient and/or family/caretaker expresses understanding of information and instructions, and is comfortable with plan to discharge. Pt is stable for discharge.     I discussed with patient and/or family/caretaker that evaluation in the ED does not suggest any emergent or life threatening medical conditions requiring immediate intervention beyond what was provided in the ED, and I believe patient is safe for discharge. Regardless, an unremarkable evaluation in the ED does not preclude the development or presence of a serious or life threatening condition. As such, I instructed that the patient is to return immediately for any worsening or change in  current symptoms.         Medical Decision Making  DDX: 1. Pneumonia 2. Bronchitis 3. CHF 4. Viral URI    ECG NSR, no acute ischemic changes, lab work reviewed and mild leukocytosis, flu and covid negative, cardiac markers negative, CXR normal, CT chest negative except mild atelectasis, VSS, no resp distress, no concerns for PE, albuterol given, feeling better, stable for discharge.     Amount and/or Complexity of Data Reviewed  Labs: ordered. Decision-making details documented in ED Course.  Radiology: ordered. Decision-making details documented in ED Course.  ECG/medicine tests: ordered and independent interpretation performed. Decision-making details documented in ED Course.    Risk  Prescription drug management.                ED Medication(s):  Medications   albuterol-ipratropium 2.5 mg-0.5 mg/3 mL nebulizer solution 3 mL (3 mLs Nebulization Given 6/6/25 1632)   ibuprofen tablet 400 mg (400 mg Oral Given 6/6/25 1813)       Discharge Medication List as of 6/6/2025  5:50 PM        START taking these medications    Details   albuterol (PROVENTIL/VENTOLIN HFA) 90 mcg/actuation inhaler Inhale 1-2 puffs into the lungs every 6 (six) hours as needed for Wheezing or Shortness of Breath. Rescue, Starting Fri 6/6/2025, Until Sat 6/6/2026 at 2359, Normal              Follow-up Information       Sandy Sanches MD. Schedule an appointment as soon as possible for a visit in 2 days.    Specialty: Family Medicine  Why: Return to the Emergency Room, If symptoms worsen  Contact information:  [address] [facility] [place] [address]  [phone]                                 Scribe Attestation:   Scribe #1: I performed the above scribed service and the documentation accurately describes the services I performed. I attest to the accuracy of the note.     Attending:   Physician Attestation Statement for Scribe #1: I, Jessica Zurita MD, personally performed the services described in this documentation, as scribed by Almita  Teressa, in my presence, and it is both accurate and complete.           Clinical Impression       ICD-10-CM ICD-9-CM   1. Bronchitis  J40 490   2. Screening for cardiovascular condition  Z13.6 V81.2       Disposition:   Disposition: Discharged  Condition: Stable         Jessica Zurita MD  06/10/25 1804

## 2025-06-11 LAB
BACTERIA BLD CULT: NORMAL
BACTERIA BLD CULT: NORMAL

## 2025-06-13 ENCOUNTER — PATIENT OUTREACH (OUTPATIENT)
Dept: ADMINISTRATIVE | Facility: HOSPITAL | Age: 70
End: 2025-06-13
Payer: MEDICARE

## 2025-06-13 NOTE — PROGRESS NOTES
CC VB Report: Pt recently seen by PCP in which colon screening orders were placed.     VBHM Score: 1      Colon Cancer Screening     Shingles/Zoster Vaccine  RSV Vaccine

## 2025-06-16 LAB
OHS CV AF BURDEN PERCENT: < 1
OHS CV DC REMOTE DEVICE TYPE: NORMAL

## 2025-06-17 RX ORDER — CLOPIDOGREL BISULFATE 75 MG/1
75 TABLET ORAL
Qty: 90 TABLET | OUTPATIENT
Start: 2025-06-17

## 2025-06-17 NOTE — TELEPHONE ENCOUNTER
Copied from CRM #[id number]. Topic: Medications - New Medication Request  >> Jun 17, 2025  8:53 AM Trinity wrote:  Type: Rx Clarification/ Additional Information/ Questions    Medication:asking for a prescription for Benadryl     What questions do you have about the medication, if any?states he is having some allergy trouble     What information is needed?    Pharmacy number:    [id number] - [place], [place] - [address] [facility] [address]  [address] [place] [address]  Phone: [phone] Fax: [phone]       Pharmacy Contact Name and phone number:     Comments:  [phone] please call pt back        Left adán DEL CID sent to pt portal.

## 2025-06-17 NOTE — TELEPHONE ENCOUNTER
Care Due:                  Date            Visit Type   Department     Provider  --------------------------------------------------------------------------------                                [facility]  Last Visit: 05-      FOLLOW UP    MEDICINE       Sandy Sanches                               -                              [facility]  Next Visit: 11-      CARE ([facility])   MEDICINE       Sandy [facility]  Test          Frequency    Reason                     Performed    Due Date  --------------------------------------------------------------------------------    Lipid Panel.  12 months..  rosuvastatin.............  04- 06-    [facility] Embedded Care Due Messages. Reference number: [id number].   6/17/2025 8:16:44 AM CDT

## 2025-06-23 ENCOUNTER — OFFICE VISIT (OUTPATIENT)
Dept: VASCULAR SURGERY | Facility: CLINIC | Age: 70
End: 2025-06-23
Payer: MEDICARE

## 2025-06-23 ENCOUNTER — HOSPITAL ENCOUNTER (EMERGENCY)
Facility: HOSPITAL | Age: 70
Discharge: HOME | End: 2025-06-23
Attending: FAMILY MEDICINE
Payer: MEDICARE

## 2025-06-23 ENCOUNTER — HOSPITAL ENCOUNTER (OUTPATIENT)
Facility: HOSPITAL | Age: 70
Discharge: HOME | End: 2025-06-23
Attending: STUDENT IN AN ORGANIZED HEALTH CARE EDUCATION/TRAINING PROGRAM
Payer: MEDICARE

## 2025-06-23 VITALS
DIASTOLIC BLOOD PRESSURE: 60 MMHG | WEIGHT: 222 LBS | HEIGHT: 66 IN | OXYGEN SATURATION: 97 % | BODY MASS INDEX: 35.68 KG/M2 | SYSTOLIC BLOOD PRESSURE: 123 MMHG | TEMPERATURE: 98 F | HEART RATE: 79 BPM | RESPIRATION RATE: 18 BRPM

## 2025-06-23 DIAGNOSIS — I72.0 CAROTID ARTERY ANEURYSM: ICD-10-CM

## 2025-06-23 DIAGNOSIS — M25.569 KNEE PAIN: ICD-10-CM

## 2025-06-23 DIAGNOSIS — M25.519 SHOULDER PAIN: ICD-10-CM

## 2025-06-23 DIAGNOSIS — I65.23 BILATERAL CAROTID ARTERY STENOSIS: Primary | ICD-10-CM

## 2025-06-23 DIAGNOSIS — I10 PRIMARY HYPERTENSION: ICD-10-CM

## 2025-06-23 DIAGNOSIS — S60.221A CONTUSION OF RIGHT HAND, INITIAL ENCOUNTER: Primary | ICD-10-CM

## 2025-06-23 DIAGNOSIS — S70.02XA CONTUSION OF LEFT HIP, INITIAL ENCOUNTER: ICD-10-CM

## 2025-06-23 DIAGNOSIS — E78.2 MIXED HYPERLIPIDEMIA: ICD-10-CM

## 2025-06-23 DIAGNOSIS — S46.911A STRAIN OF RIGHT SHOULDER, INITIAL ENCOUNTER: ICD-10-CM

## 2025-06-23 DIAGNOSIS — S80.01XA CONTUSION OF RIGHT KNEE, INITIAL ENCOUNTER: ICD-10-CM

## 2025-06-23 LAB
LEFT ARM DIASTOLIC BLOOD PRESSURE: 62 MMHG
LEFT ARM SYSTOLIC BLOOD PRESSURE: 120 MMHG
LEFT CCA DIST DIAS: 11.87 CM/S
LEFT CCA DIST SYS: 64.42 CM/S
LEFT CCA MID DIAS: 16.75 CM/S
LEFT CCA MID SYS: 80.12 CM/S
LEFT CCA PROX DIAS: 10.86 CM/S
LEFT CCA PROX SYS: 76.5 CM/S
LEFT ECA DIAS: 8.72 CM/S
LEFT ECA SYS: 75.19 CM/S
LEFT ICA DIST DIAS: 24.9 CM/S
LEFT ICA DIST SYS: 72.43 CM/S
LEFT ICA MID DIAS: 19.01 CM/S
LEFT ICA MID SYS: 59.3 CM/S
LEFT ICA PROX DIAS: 22.34 CM/S
LEFT ICA PROX SYS: 59.39 CM/S
LEFT ICA/CCA SYS: 1.12
LEFT VERTEBRAL DIAS: 10.9 CM/S
LEFT VERTEBRAL SYS: 56.66 CM/S
OHS CV CAROTID RIGHT ICA EDV HIGHEST: 23.74
OHS CV CAROTID ULTRASOUND LEFT ICA/CCA RATIO: 1.12
OHS CV CAROTID ULTRASOUND RIGHT ICA/CCA RATIO: 1.58
OHS CV PV CAROTID LEFT HIGHEST CCA: 80
OHS CV PV CAROTID LEFT HIGHEST ICA: 72.43
OHS CV PV CAROTID RIGHT HIGHEST CCA: 68
OHS CV PV CAROTID RIGHT HIGHEST ICA: 98.01
OHS CV US CAROTID LEFT HIGHEST EDV: 24.9
RIGHT ARM DIASTOLIC BLOOD PRESSURE: 60 MMHG
RIGHT ARM SYSTOLIC BLOOD PRESSURE: 116 MMHG
RIGHT CCA DIST DIAS: 16.12 CM/S
RIGHT CCA DIST SYS: 62 CM/S
RIGHT CCA MID DIAS: 11.78 CM/S
RIGHT CCA MID SYS: 65.72 CM/S
RIGHT CCA PROX DIAS: 11.72 CM/S
RIGHT CCA PROX SYS: 68.14 CM/S
RIGHT ECA DIAS: 8.24 CM/S
RIGHT ECA SYS: 86.54 CM/S
RIGHT ICA DIST DIAS: 17.05 CM/S
RIGHT ICA DIST SYS: 56.61 CM/S
RIGHT ICA MID DIAS: 18.87 CM/S
RIGHT ICA MID SYS: 71.22 CM/S
RIGHT ICA PROX DIAS: 23.74 CM/S
RIGHT ICA PROX SYS: 98.01 CM/S
RIGHT ICA/CCA SYS: 1.58
RIGHT PROX SUBCLAVIAN ARTERY: 119.49 CM/S
RIGHT VERTEBRAL DIAS: 13.13 CM/S
RIGHT VERTEBRAL SYS: 49.34 CM/S

## 2025-06-23 PROCEDURE — 93880 EXTRACRANIAL BILAT STUDY: CPT

## 2025-06-23 PROCEDURE — 4010F ACE/ARB THERAPY RXD/TAKEN: CPT | Mod: CPTII,S$GLB,, | Performed by: STUDENT IN AN ORGANIZED HEALTH CARE EDUCATION/TRAINING PROGRAM

## 2025-06-23 PROCEDURE — 99284 EMERGENCY DEPT VISIT MOD MDM: CPT | Mod: 25

## 2025-06-23 PROCEDURE — 93880 EXTRACRANIAL BILAT STUDY: CPT | Mod: 26,,, | Performed by: STUDENT IN AN ORGANIZED HEALTH CARE EDUCATION/TRAINING PROGRAM

## 2025-06-23 PROCEDURE — 99024 POSTOP FOLLOW-UP VISIT: CPT | Mod: S$GLB,,, | Performed by: STUDENT IN AN ORGANIZED HEALTH CARE EDUCATION/TRAINING PROGRAM

## 2025-06-23 PROCEDURE — 99999 PR PBB SHADOW E&M-EST. PATIENT-LVL II: CPT | Mod: PBBFAC,,, | Performed by: STUDENT IN AN ORGANIZED HEALTH CARE EDUCATION/TRAINING PROGRAM

## 2025-06-23 PROCEDURE — 25000003 PHARM REV CODE 250: Performed by: NURSE PRACTITIONER

## 2025-06-23 PROCEDURE — 3044F HG A1C LEVEL LT 7.0%: CPT | Mod: CPTII,S$GLB,, | Performed by: STUDENT IN AN ORGANIZED HEALTH CARE EDUCATION/TRAINING PROGRAM

## 2025-06-23 PROCEDURE — 1160F RVW MEDS BY RX/DR IN RCRD: CPT | Mod: CPTII,S$GLB,, | Performed by: STUDENT IN AN ORGANIZED HEALTH CARE EDUCATION/TRAINING PROGRAM

## 2025-06-23 PROCEDURE — 1159F MED LIST DOCD IN RCRD: CPT | Mod: CPTII,S$GLB,, | Performed by: STUDENT IN AN ORGANIZED HEALTH CARE EDUCATION/TRAINING PROGRAM

## 2025-06-23 RX ORDER — HYDROCODONE BITARTRATE AND ACETAMINOPHEN 10; 325 MG/1; MG/1
1 TABLET ORAL
Refills: 0 | Status: COMPLETED | OUTPATIENT
Start: 2025-06-23 | End: 2025-06-23

## 2025-06-23 RX ORDER — HYDROCODONE BITARTRATE AND ACETAMINOPHEN 7.5; 325 MG/1; MG/1
1 TABLET ORAL EVERY 6 HOURS PRN
Qty: 12 TABLET | Refills: 0 | Status: SHIPPED | OUTPATIENT
Start: 2025-06-23

## 2025-06-23 RX ADMIN — HYDROCODONE BITARTRATE AND ACETAMINOPHEN 1 TABLET: 10; 325 TABLET ORAL at 10:06

## 2025-06-23 NOTE — ASSESSMENT & PLAN NOTE
S/p left CEA for symptomatic carotid stenosis. Healed well. Doing well. No gross focal deficits since surgery. Continue plavix and statin therapy lifelong. Return in 6 months for recheck, if stable can see me annually thereafter
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
## 2025-07-29 ENCOUNTER — PATIENT MESSAGE (OUTPATIENT)
Dept: CARDIOLOGY | Facility: HOSPITAL | Age: 70
End: 2025-07-29
Payer: MEDICARE

## 2025-08-01 ENCOUNTER — HOSPITAL ENCOUNTER (OUTPATIENT)
Dept: RADIOLOGY | Facility: HOSPITAL | Age: 70
Discharge: HOME | End: 2025-08-01
Attending: NURSE PRACTITIONER
Payer: MEDICARE

## 2025-08-01 ENCOUNTER — OFFICE VISIT (OUTPATIENT)
Dept: FAMILY MEDICINE | Facility: CLINIC | Age: 70
End: 2025-08-01
Payer: MEDICARE

## 2025-08-01 VITALS
WEIGHT: 216.38 LBS | OXYGEN SATURATION: 97 % | TEMPERATURE: 97 F | HEIGHT: 66 IN | SYSTOLIC BLOOD PRESSURE: 88 MMHG | BODY MASS INDEX: 34.78 KG/M2 | HEART RATE: 77 BPM | DIASTOLIC BLOOD PRESSURE: 60 MMHG

## 2025-08-01 DIAGNOSIS — R10.13 EPIGASTRIC PAIN: ICD-10-CM

## 2025-08-01 DIAGNOSIS — T78.40XD ALLERGY, SUBSEQUENT ENCOUNTER: ICD-10-CM

## 2025-08-01 DIAGNOSIS — Z76.0 MEDICATION REFILL: ICD-10-CM

## 2025-08-01 DIAGNOSIS — M54.2 CERVICALGIA: ICD-10-CM

## 2025-08-01 DIAGNOSIS — K21.9 GASTROESOPHAGEAL REFLUX DISEASE, UNSPECIFIED WHETHER ESOPHAGITIS PRESENT: Primary | ICD-10-CM

## 2025-08-01 LAB
OHS QRS DURATION: 68 MS
OHS QTC CALCULATION: 396 MS

## 2025-08-01 PROCEDURE — 74019 RADEX ABDOMEN 2 VIEWS: CPT | Mod: 26,,, | Performed by: RADIOLOGY

## 2025-08-01 PROCEDURE — 74019 RADEX ABDOMEN 2 VIEWS: CPT | Mod: TC,PO

## 2025-08-01 PROCEDURE — 99999 PR PBB SHADOW E&M-EST. PATIENT-LVL V: CPT | Mod: PBBFAC,,, | Performed by: NURSE PRACTITIONER

## 2025-08-01 PROCEDURE — 72040 X-RAY EXAM NECK SPINE 2-3 VW: CPT | Mod: 26,,, | Performed by: RADIOLOGY

## 2025-08-01 PROCEDURE — 72040 X-RAY EXAM NECK SPINE 2-3 VW: CPT | Mod: TC,PO

## 2025-08-01 RX ORDER — SUCRALFATE 1 G/1
1 TABLET ORAL 4 TIMES DAILY
Qty: 40 TABLET | Refills: 0 | Status: SHIPPED | OUTPATIENT
Start: 2025-08-01 | End: 2025-08-11

## 2025-08-01 RX ORDER — CLOPIDOGREL BISULFATE 75 MG/1
75 TABLET ORAL DAILY
Qty: 90 TABLET | Refills: 3 | Status: SHIPPED | OUTPATIENT
Start: 2025-08-01 | End: 2026-08-01

## 2025-08-01 NOTE — PATIENT INSTRUCTIONS
Hydrate well  Rest   Zyrtec OTC as directed  Hold lisinopril today; check BP this evening; if remains <90/60 report to ER immediately  Report to ER immediately if symptoms worsen or persist

## 2025-08-01 NOTE — PROGRESS NOTES
Subjective     Patient ID: Javier Barrera is a 69 y.o. male.    Chief Complaint: Gastroesophageal Reflux and Neck Pain    Gastroesophageal Reflux  He complains of abdominal pain and heartburn. He reports no belching, no chest pain, no choking, no coughing, no dysphagia, no early satiety, no globus sensation, no hoarse voice, no nausea, no sore throat, no stridor, no tooth decay, no water brash or no wheezing. This is a new problem. The current episode started 1 to 4 weeks ago. The problem occurs frequently. The problem has been unchanged. The heartburn duration is several minutes. The heartburn is located in the abdomen. The heartburn is of moderate intensity. The heartburn wakes him from sleep. The heartburn limits his activity. The heartburn changes with position. Nothing aggravates the symptoms. Pertinent negatives include no anemia, fatigue, melena, muscle weakness, orthopnea or weight loss. Risk factors include ETOH use, obesity and NSAIDs. He has tried nothing for the symptoms. The treatment provided no relief. Past procedures do not include an abdominal ultrasound, an EGD, esophageal manometry, esophageal pH monitoring, H. pylori antibody titer or a UGI. Past invasive treatments do not include gastroplasty, gastroplication or reflux surgery.   Neck Pain   This is a chronic problem. The current episode started more than 1 year ago. The problem occurs intermittently. The problem has been unchanged. The pain is associated with nothing (fall > 1 y ago). The pain is present in the midline. The quality of the pain is described as aching. The pain is moderate. The symptoms are aggravated by position. Pertinent negatives include no chest pain, fever, headaches, leg pain, numbness, pain with swallowing, paresis, photophobia, syncope, tingling, trouble swallowing, visual change, weakness or weight loss. He has tried NSAIDs for the symptoms. The treatment provided moderate relief.   Sinus Problem  This is a new  "problem. The current episode started in the past 7 days. The problem is unchanged. There has been no fever. He is experiencing no pain. Associated symptoms include neck pain and sinus pressure. Pertinent negatives include no chills, congestion, coughing, diaphoresis, ear pain, headaches, hoarse voice, shortness of breath, sneezing, sore throat or swollen glands. Past treatments include nothing. The treatment provided no relief.   BP low today; pt asymptomatic; denies CP, SOB, HA, dizziness; pt states, "my BP has been running low like that for a while"; declines ER at this time.   Past Medical History:   Diagnosis Date    Diabetes mellitus, type 2     Hyperlipidemia     Hypertension     Right sided sciatica     Stroke      Social History[1]  Past Surgical History:   Procedure Laterality Date    CAROTID ENDARTERECTOMY Left 5/20/2025    Procedure: ENDARTERECTOMY, CAROTID;  Surgeon: Luis Cramer MD;  Location: [facility] OR;  Service: Peripheral Vascular;  Laterality: Left;    HEMORRHOID SURGERY      INSERTION OF IMPLANTABLE LOOP RECORDER Left 2/2/2022    Procedure: INSERTION, IMPLANTABLE LOOP RECORDER;  Surgeon: Jairon Barahona MD;  Location: [facility] CATH LAB;  Service: Cardiology;  Laterality: Left;  scheduled for lukasz and loop implant in cvru (MDT)       Review of Systems   Constitutional: Negative.  Negative for chills, diaphoresis, fatigue, fever and weight loss.   HENT:  Positive for sinus pressure/congestion. Negative for nasal congestion, ear pain, hoarse voice, sneezing, sore throat and trouble swallowing.    Eyes: Negative.  Negative for photophobia.   Respiratory:  Negative for cough, choking, shortness of breath and wheezing.    Cardiovascular: Negative.  Negative for chest pain and syncope.   Gastrointestinal:  Positive for abdominal pain, heartburn and reflux. Negative for dysphagia, melena and nausea.   Endocrine: Negative.    Genitourinary: Negative.    Musculoskeletal:  Positive for neck pain. Negative " for leg pain and muscle weakness.   Integumentary:  Negative.   Allergic/Immunologic: Positive for environmental allergies.   Neurological: Negative.  Negative for tingling, weakness, numbness and headaches.   Psychiatric/Behavioral: Negative.            Objective     Physical Exam  Vitals and nursing note reviewed.   Constitutional:       Appearance: Normal appearance.   HENT:      Head: Normocephalic.      Right Ear: Tympanic membrane, ear canal and external ear normal.      Left Ear: Tympanic membrane, ear canal and external ear normal.      Nose: Nose normal.      Mouth/Throat:      Mouth: Mucous membranes are moist.      Pharynx: Oropharynx is clear.   Eyes:      Conjunctiva/sclera: Conjunctivae normal.      Pupils: Pupils are equal, round, and reactive to light.   Cardiovascular:      Rate and Rhythm: Normal rate and regular rhythm.      Pulses: Normal pulses.      Heart sounds: Normal heart sounds.   Pulmonary:      Effort: Pulmonary effort is normal.      Breath sounds: Normal breath sounds.   Abdominal:      General: Bowel sounds are normal.      Palpations: Abdomen is soft.      Tenderness: There is abdominal tenderness in the epigastric area. There is no right CVA tenderness, left CVA tenderness, guarding or rebound. Negative signs include Guerrero's sign, Rovsing's sign, McBurney's sign, psoas sign and obturator sign.   Musculoskeletal:         General: Normal range of motion.      Cervical back: Normal range of motion and neck supple. No edema, erythema, signs of trauma, rigidity, torticollis or crepitus. Pain with movement present. No spinous process tenderness or muscular tenderness. Normal range of motion.   Skin:     General: Skin is warm and dry.      Capillary Refill: Capillary refill takes 2 to 3 seconds.   Neurological:      Mental Status: He is alert and oriented to person, place, and time.   Psychiatric:         Mood and Affect: Mood normal.         Behavior: Behavior normal.         Thought  Content: Thought content normal.         Judgment: Judgment normal.            Assessment and Plan     1. Gastroesophageal reflux disease, unspecified whether esophagitis present  2. Epigastric pain  -     H. pylori antigen, stool; Future; Expected date: 2025  -     Ambulatory referral/consult to Gastroenterology; Future; Expected date: 2025  -     IN OFFICE EKG 12-LEAD (to Dan[place])  -     Ambulatory referral/consult to Gastroenterology; Future; Expected date: 2025  -     X-Ray Abdomen Flat And Erect; Future; Expected date: 2025  -     Hepatic Function Panel; Future; Expected date: 2025  -     CBC Without Differential; Future; Expected date: 2025  -     sucralfate (CARAFATE) 1 gram tablet; Take 1 tablet (1 g total) by mouth 4 (four) times daily. for 10 days  Dispense: 40 tablet; Refill: 0    3. Cervicalgia  -     X-Ray Cervical Spine AP And Lateral; Future; Expected date: 2025    4. Allergy, subsequent encounter  Zyrtec OTC as directed  Flonase OTC as directed    5. Medication refill  -     clopidogreL (PLAVIX) 75 mg tablet; Take 1 tablet (75 mg total) by mouth once daily.  Dispense: 90 tablet; Refill: 3    Hydrate well  Rest   Zyrtec OTC as directed  Hold lisinopril today; check BP this evening; if remains <90/60 report to ER immediately  Report to ER immediately if symptoms worsen or persist             No follow-ups on file.         [1]   Social History  Socioeconomic History    Marital status:    Tobacco Use    Smoking status: Former     Current packs/day: 0.00     Types: Cigarettes     Quit date: 2001     Years since quittin.5    Smokeless tobacco: Never   Substance and Sexual Activity    Alcohol use: Not Currently    Drug use: Never    Sexual activity: Not Currently     Social Drivers of Health     Financial Resource Strain: High Risk (2025)    Overall Financial Resource Strain (CARDIA)     Difficulty of Paying Living Expenses: Hard   Food  Insecurity: Food Insecurity Present (5/18/2025)    Hunger Vital Sign     Worried About Running Out of Food in the Last Year: Often true     Ran Out of Food in the Last Year: Sometimes true   Transportation Needs: Unmet Transportation Needs (5/18/2025)    PRAPARE - Transportation     Lack of Transportation (Medical): Yes     Lack of Transportation (Non-Medical): Yes   Stress: No Stress Concern Present (5/18/2025)    [employer] - Occupational Stress Questionnaire     Feeling of Stress : Not at all   Housing Stability: High Risk (5/18/2025)    Housing Stability Vital Sign     Unable to Pay for Housing in the Last Year: Yes     Number of Times Moved in the Last Year: 2     Homeless in the Last Year: Yes

## 2025-08-05 ENCOUNTER — TELEPHONE (OUTPATIENT)
Dept: PAIN MEDICINE | Facility: CLINIC | Age: 70
End: 2025-08-05
Payer: MEDICARE

## 2025-08-05 ENCOUNTER — PATIENT MESSAGE (OUTPATIENT)
Dept: CARDIOLOGY | Facility: CLINIC | Age: 70
End: 2025-08-05
Payer: MEDICARE

## 2025-08-05 NOTE — TELEPHONE ENCOUNTER
Reached out to patient to reschedule appointment because the provider will be out of clinic. No answer. Left message on patients voice mail to call back at earliest convenience to schedule apt.       Rowena Fiore  Medical

## 2025-08-07 ENCOUNTER — TELEPHONE (OUTPATIENT)
Dept: PAIN MEDICINE | Facility: CLINIC | Age: 70
End: 2025-08-07
Payer: MEDICARE

## 2025-08-08 ENCOUNTER — TELEPHONE (OUTPATIENT)
Dept: PAIN MEDICINE | Facility: CLINIC | Age: 70
End: 2025-08-08
Payer: MEDICARE

## 2025-08-08 ENCOUNTER — PATIENT OUTREACH (OUTPATIENT)
Dept: ADMINISTRATIVE | Facility: HOSPITAL | Age: 70
End: 2025-08-08
Payer: MEDICARE

## 2025-08-08 NOTE — TELEPHONE ENCOUNTER
Reached out to patient to reschedule appointment because the provider will be out of clinic. No answer. Voicemall box isn't set up yet for sister. Called friend said he would call the pt.      Rowena Fiore  Medical

## 2025-08-11 ENCOUNTER — TELEPHONE (OUTPATIENT)
Dept: PAIN MEDICINE | Facility: CLINIC | Age: 70
End: 2025-08-11
Payer: MEDICARE

## 2025-08-11 ENCOUNTER — TELEPHONE (OUTPATIENT)
Dept: CARDIOLOGY | Facility: HOSPITAL | Age: 70
End: 2025-08-11
Payer: MEDICARE

## 2025-08-11 RX ORDER — SUCRALFATE 1 G/1
TABLET ORAL
Qty: 40 TABLET | Refills: 0 | OUTPATIENT
Start: 2025-08-11

## 2025-08-21 ENCOUNTER — TELEPHONE (OUTPATIENT)
Dept: CARDIOLOGY | Facility: CLINIC | Age: 70
End: 2025-08-21
Payer: MEDICARE

## 2025-08-29 DIAGNOSIS — S70.02XA CONTUSION OF LEFT HIP, INITIAL ENCOUNTER: ICD-10-CM

## 2025-08-29 DIAGNOSIS — S80.01XA CONTUSION OF RIGHT KNEE, INITIAL ENCOUNTER: ICD-10-CM

## 2025-08-29 DIAGNOSIS — E55.9 VITAMIN D DEFICIENCY: ICD-10-CM

## 2025-08-29 DIAGNOSIS — E11.9 TYPE 2 DIABETES MELLITUS WITHOUT COMPLICATION, WITHOUT LONG-TERM CURRENT USE OF INSULIN: ICD-10-CM

## 2025-08-29 DIAGNOSIS — S60.221A CONTUSION OF RIGHT HAND, INITIAL ENCOUNTER: ICD-10-CM

## 2025-08-29 DIAGNOSIS — S46.911A STRAIN OF RIGHT SHOULDER, INITIAL ENCOUNTER: ICD-10-CM

## 2025-08-29 DIAGNOSIS — M25.519 SHOULDER PAIN: ICD-10-CM

## 2025-08-29 DIAGNOSIS — M25.569 KNEE PAIN: ICD-10-CM

## 2025-08-29 DIAGNOSIS — I10 PRIMARY HYPERTENSION: ICD-10-CM

## 2025-08-29 DIAGNOSIS — E78.2 MIXED HYPERLIPIDEMIA: ICD-10-CM

## 2025-08-29 RX ORDER — CLOPIDOGREL BISULFATE 75 MG/1
75 TABLET ORAL DAILY
Qty: 90 TABLET | Refills: 3 | Status: CANCELLED | OUTPATIENT
Start: 2025-08-29 | End: 2026-08-29

## 2025-08-29 RX ORDER — POTASSIUM CHLORIDE 20 MEQ/1
20 TABLET, EXTENDED RELEASE ORAL DAILY
Status: CANCELLED | OUTPATIENT
Start: 2025-08-29

## 2025-08-29 RX ORDER — POTASSIUM CHLORIDE 20 MEQ/1
20 TABLET, EXTENDED RELEASE ORAL DAILY
Qty: 90 TABLET | Refills: 3 | Status: SHIPPED | OUTPATIENT
Start: 2025-08-29

## 2025-08-29 RX ORDER — ERGOCALCIFEROL 1.25 MG/1
50000 CAPSULE ORAL
Qty: 12 CAPSULE | Refills: 3 | Status: SHIPPED | OUTPATIENT
Start: 2025-08-29 | End: 2026-07-31

## 2025-08-29 RX ORDER — ALBUTEROL SULFATE 90 UG/1
1-2 INHALANT RESPIRATORY (INHALATION) EVERY 6 HOURS PRN
Qty: 6.7 G | Refills: 0 | Status: CANCELLED | OUTPATIENT
Start: 2025-08-29 | End: 2026-08-29

## 2025-08-29 RX ORDER — CLOPIDOGREL BISULFATE 75 MG/1
75 TABLET ORAL DAILY
Qty: 90 TABLET | Refills: 3 | Status: SHIPPED | OUTPATIENT
Start: 2025-08-29 | End: 2026-08-29

## 2025-08-29 RX ORDER — ALBUTEROL SULFATE 90 UG/1
1-2 INHALANT RESPIRATORY (INHALATION) EVERY 6 HOURS PRN
Qty: 6.7 G | Refills: 0 | Status: SHIPPED | OUTPATIENT
Start: 2025-08-29 | End: 2026-08-29

## 2025-08-29 RX ORDER — ROSUVASTATIN CALCIUM 20 MG/1
20 TABLET, COATED ORAL NIGHTLY
Qty: 90 TABLET | Refills: 3 | Status: CANCELLED | OUTPATIENT
Start: 2025-08-29

## 2025-08-29 RX ORDER — MULTIVITAMIN
1 TABLET ORAL DAILY
Status: CANCELLED | OUTPATIENT
Start: 2025-08-29

## 2025-08-29 RX ORDER — GABAPENTIN 600 MG/1
600 TABLET ORAL 3 TIMES DAILY
Qty: 90 TABLET | Refills: 11 | Status: SHIPPED | OUTPATIENT
Start: 2025-08-29 | End: 2026-08-29

## 2025-08-29 RX ORDER — CYCLOBENZAPRINE HCL 10 MG
10 TABLET ORAL 3 TIMES DAILY
Qty: 60 TABLET | Refills: 3 | Status: SHIPPED | OUTPATIENT
Start: 2025-08-29

## 2025-08-29 RX ORDER — LANOLIN ALCOHOL/MO/W.PET/CERES
100 CREAM (GRAM) TOPICAL DAILY
Status: CANCELLED | OUTPATIENT
Start: 2025-08-29

## 2025-08-29 RX ORDER — LISINOPRIL 20 MG/1
20 TABLET ORAL DAILY
Qty: 90 TABLET | Refills: 3 | Status: SHIPPED | OUTPATIENT
Start: 2025-08-29

## 2025-08-29 RX ORDER — ROSUVASTATIN CALCIUM 20 MG/1
20 TABLET, COATED ORAL NIGHTLY
Qty: 90 TABLET | Refills: 3 | Status: SHIPPED | OUTPATIENT
Start: 2025-08-29

## 2025-08-29 RX ORDER — LANOLIN ALCOHOL/MO/W.PET/CERES
100 CREAM (GRAM) TOPICAL DAILY
Qty: 90 TABLET | Refills: 3 | Status: SHIPPED | OUTPATIENT
Start: 2025-08-29

## 2025-08-29 RX ORDER — HYDROCODONE BITARTRATE AND ACETAMINOPHEN 7.5; 325 MG/1; MG/1
1 TABLET ORAL EVERY 6 HOURS PRN
Qty: 12 TABLET | Refills: 0 | Status: CANCELLED | OUTPATIENT
Start: 2025-08-29

## 2025-08-29 RX ORDER — GABAPENTIN 600 MG/1
600 TABLET ORAL 3 TIMES DAILY
Qty: 90 TABLET | Refills: 11 | Status: CANCELLED | OUTPATIENT
Start: 2025-08-29 | End: 2026-08-29

## 2025-08-29 RX ORDER — INSULIN PUMP SYRINGE, 3 ML
EACH MISCELLANEOUS
Qty: 1 EACH | Refills: 3 | Status: CANCELLED | OUTPATIENT
Start: 2025-08-29

## 2025-08-29 RX ORDER — MULTIVITAMIN
1 TABLET ORAL DAILY
Qty: 90 TABLET | Refills: 3 | Status: SHIPPED | OUTPATIENT
Start: 2025-08-29

## (undated) DEVICE — GOWN POLY REINF BRTH SLV XL

## (undated) DEVICE — COVER LIGHT HANDLE 80/CA

## (undated) DEVICE — LOOP VESSEL MAXI RED

## (undated) DEVICE — DRESSING TRANS 4X4 TEGADERM

## (undated) DEVICE — SUT MONOCRYL 4-0 PS-1 UND

## (undated) DEVICE — APPLICATOR CHLORAPREP ORN 26ML

## (undated) DEVICE — PAD CURAD NONADH 3X4IN

## (undated) DEVICE — GAUZE SPONGE PEANUT STRL

## (undated) DEVICE — BLADE SCALP OPHTL BEVEL STR

## (undated) DEVICE — MANIFOLD 4 PORT

## (undated) DEVICE — DRAPE THYROID SOFT STERILE

## (undated) DEVICE — SPONGE SURGIFOAM GELATIN SZ50

## (undated) DEVICE — DRAPE INCISE IOBAN 2 13X13IN

## (undated) DEVICE — DRAPE INSTR MAGNETIC 10X16IN

## (undated) DEVICE — DRESSING AQUACEL AG ADV 3.5X12

## (undated) DEVICE — TOWEL OR DISP STRL BLUE 4/PK

## (undated) DEVICE — HEMOSTAT SURGICEL 4X8IN

## (undated) DEVICE — SYR 1CC TB SG 27GX1/2

## (undated) DEVICE — SHEET THYROID W/ISO-BAC

## (undated) DEVICE — COVER SURG TABLE BACK 44X76IN

## (undated) DEVICE — SUT VICRYL 3-0 27 SH

## (undated) DEVICE — SUT SILK 2-0 STRANDS 30IN

## (undated) DEVICE — SUT VICRYL 2-0 27 CT-1

## (undated) DEVICE — SUT SILK 3-0 STRANDS 30IN

## (undated) DEVICE — SPONGE LAP 18X18 PREWASHED

## (undated) DEVICE — SOL NACL 0.9% IV INJ 500ML

## (undated) DEVICE — BOOT SUTURE AID

## (undated) DEVICE — DRAPE THREE-QTR REINF 53X77IN

## (undated) DEVICE — PACK BASIC SETUP SC BR

## (undated) DEVICE — TOWEL COTTON SURG WHT 27X17IN

## (undated) DEVICE — CLIP LIGACLIP XTRA TITANIUM

## (undated) DEVICE — SUT PROLENE 6-0 BV-1

## (undated) DEVICE — CATH URETHRAL RED 16FR

## (undated) DEVICE — ADHESIVE DERMABOND ADVANCED

## (undated) DEVICE — NDL ECLIPSE SAF REG 25GX1.5IN

## (undated) DEVICE — SYR 10CC LUER LOCK

## (undated) DEVICE — CLIP LIGATING TITANIUM SMALL

## (undated) DEVICE — HOLDER SHARPS SHORTSTOP

## (undated) DEVICE — ELECTRODE REM PLYHSV RETURN 9

## (undated) DEVICE — LOOP VESSEL BLUE MINI 2/CARD

## (undated) DEVICE — SUT PROLENE 6-0 BV-1 30IN